# Patient Record
Sex: FEMALE | Race: WHITE | Employment: OTHER | URBAN - METROPOLITAN AREA
[De-identification: names, ages, dates, MRNs, and addresses within clinical notes are randomized per-mention and may not be internally consistent; named-entity substitution may affect disease eponyms.]

---

## 2021-01-08 ENCOUNTER — HOSPITAL ENCOUNTER (OUTPATIENT)
Age: 76
Setting detail: SPECIMEN
Discharge: HOME | End: 2021-01-08

## 2021-01-08 LAB
ALBUMIN SERPL-MCNC: 3 GM/DL (ref 3.4–5)
ALP BLD-CCNC: 108 IU/L (ref 40–128)
ALT SERPL-CCNC: 17 U/L (ref 10–40)
ANION GAP SERPL CALCULATED.3IONS-SCNC: 11 MMOL/L (ref 4–16)
AST SERPL-CCNC: 23 IU/L (ref 15–37)
BILIRUB SERPL-MCNC: 0.4 MG/DL (ref 0–1)
BUN BLDV-MCNC: 17 MG/DL (ref 6–23)
CALCIUM SERPL-MCNC: 8.4 MG/DL (ref 8.3–10.6)
CHLORIDE BLD-SCNC: 101 MMOL/L (ref 99–110)
CO2: 25 MMOL/L (ref 21–32)
CREAT SERPL-MCNC: 0.8 MG/DL (ref 0.6–1.1)
GFR AFRICAN AMERICAN: >60 ML/MIN/1.73M2
GFR NON-AFRICAN AMERICAN: >60 ML/MIN/1.73M2
GLUCOSE BLD-MCNC: 92 MG/DL (ref 70–99)
HCT VFR BLD CALC: 38.8 % (ref 37–47)
HEMOGLOBIN: 12 GM/DL (ref 12.5–16)
MCH RBC QN AUTO: 31.7 PG (ref 27–31)
MCHC RBC AUTO-ENTMCNC: 30.9 % (ref 32–36)
MCV RBC AUTO: 102.4 FL (ref 78–100)
PDW BLD-RTO: 14.3 % (ref 11.7–14.9)
PLATELET # BLD: 302 K/CU MM (ref 140–440)
PMV BLD AUTO: 9.3 FL (ref 7.5–11.1)
POTASSIUM SERPL-SCNC: 4.4 MMOL/L (ref 3.5–5.1)
PRO-BNP: 2732 PG/ML
RBC # BLD: 3.79 M/CU MM (ref 4.2–5.4)
SODIUM BLD-SCNC: 137 MMOL/L (ref 135–145)
TOTAL PROTEIN: 6 GM/DL (ref 6.4–8.2)
WBC # BLD: 7.9 K/CU MM (ref 4–10.5)

## 2021-01-08 PROCEDURE — 83880 ASSAY OF NATRIURETIC PEPTIDE: CPT

## 2021-01-08 PROCEDURE — 36415 COLL VENOUS BLD VENIPUNCTURE: CPT

## 2021-01-08 PROCEDURE — 85027 COMPLETE CBC AUTOMATED: CPT

## 2021-01-08 PROCEDURE — 80053 COMPREHEN METABOLIC PANEL: CPT

## 2021-08-19 ENCOUNTER — HOSPITAL ENCOUNTER (OUTPATIENT)
Dept: MRI IMAGING | Age: 76
Discharge: HOME | End: 2021-08-19
Payer: MEDICARE

## 2021-08-19 VITALS — OXYGEN SATURATION: 95 % | HEART RATE: 69 BPM | RESPIRATION RATE: 20 BRPM

## 2021-08-19 DIAGNOSIS — M54.42 ACUTE LEFT-SIDED LOW BACK PAIN WITH LEFT-SIDED SCIATICA: ICD-10-CM

## 2021-08-19 PROCEDURE — 72148 MRI LUMBAR SPINE W/O DYE: CPT

## 2021-08-19 NOTE — PROGRESS NOTES
1405 Pt in MRI for MRI of lumbar spine with pacemaker. Pt set to MRI safe mode of DOO rate of 70 per [employer] rep. Pt offers no complaints. 1412 Pt placed on scanner and attached to monitor. Scan started. 1428 Scan complete. Pt tolerated well. 1431 Pt returned to normal pacemaker settings per [employer] rep. Pt offers no complaints.

## 2022-01-30 ENCOUNTER — APPOINTMENT (OUTPATIENT)
Dept: GENERAL RADIOLOGY | Age: 77
DRG: 519 | End: 2022-01-30
Payer: MEDICARE

## 2022-01-30 ENCOUNTER — HOSPITAL ENCOUNTER (INPATIENT)
Age: 77
LOS: 12 days | Discharge: NURSING, REHAB OR LONG-TERM CARE | DRG: 519 | End: 2022-02-11
Attending: EMERGENCY MEDICINE | Admitting: ORTHOPAEDIC SURGERY
Payer: MEDICARE

## 2022-01-30 DIAGNOSIS — M48.00 SPINAL STENOSIS, UNSPECIFIED SPINAL REGION: ICD-10-CM

## 2022-01-30 DIAGNOSIS — M54.50 MIDLINE LOW BACK PAIN WITHOUT SCIATICA, UNSPECIFIED CHRONICITY: Primary | ICD-10-CM

## 2022-01-30 LAB
ABO: NORMAL
ANION GAP SERPL CALCULATED.3IONS-SCNC: 11 MEQ/L (ref 8–16)
ANTIBODY SCREEN: NORMAL
BASOPHILS # BLD: 0.3 %
BASOPHILS ABSOLUTE: 0 THOU/MM3 (ref 0–0.1)
BUN BLDV-MCNC: 14 MG/DL (ref 7–22)
CALCIUM SERPL-MCNC: 9.4 MG/DL (ref 8.5–10.5)
CHLORIDE BLD-SCNC: 101 MEQ/L (ref 98–111)
CO2: 25 MEQ/L (ref 23–33)
CREAT SERPL-MCNC: 0.5 MG/DL (ref 0.4–1.2)
EOSINOPHIL # BLD: 0.6 %
EOSINOPHILS ABSOLUTE: 0 THOU/MM3 (ref 0–0.4)
ERYTHROCYTE [DISTWIDTH] IN BLOOD BY AUTOMATED COUNT: 13.6 % (ref 11.5–14.5)
ERYTHROCYTE [DISTWIDTH] IN BLOOD BY AUTOMATED COUNT: 47 FL (ref 35–45)
GFR SERPL CREATININE-BSD FRML MDRD: > 90 ML/MIN/1.73M2
GLUCOSE BLD-MCNC: 85 MG/DL (ref 70–108)
HCT VFR BLD CALC: 41 % (ref 37–47)
HEMOGLOBIN: 13.3 GM/DL (ref 12–16)
IMMATURE GRANS (ABS): 0.02 THOU/MM3 (ref 0–0.07)
IMMATURE GRANULOCYTES: 0.3 %
LYMPHOCYTES # BLD: 31.4 %
LYMPHOCYTES ABSOLUTE: 2.5 THOU/MM3 (ref 1–4.8)
MCH RBC QN AUTO: 30.9 PG (ref 26–33)
MCHC RBC AUTO-ENTMCNC: 32.4 GM/DL (ref 32.2–35.5)
MCV RBC AUTO: 95.1 FL (ref 81–99)
MONOCYTES # BLD: 10.3 %
MONOCYTES ABSOLUTE: 0.8 THOU/MM3 (ref 0.4–1.3)
NUCLEATED RED BLOOD CELLS: 0 /100 WBC
OSMOLALITY CALCULATION: 273.5 MOSMOL/KG (ref 275–300)
PLATELET # BLD: 222 THOU/MM3 (ref 130–400)
PMV BLD AUTO: 10.6 FL (ref 9.4–12.4)
POTASSIUM REFLEX MAGNESIUM: 3.7 MEQ/L (ref 3.5–5.2)
PROCALCITONIN: 0.05 NG/ML (ref 0.01–0.09)
RBC # BLD: 4.31 MILL/MM3 (ref 4.2–5.4)
RH FACTOR: NORMAL
SEG NEUTROPHILS: 57.1 %
SEGMENTED NEUTROPHILS ABSOLUTE COUNT: 4.5 THOU/MM3 (ref 1.8–7.7)
SODIUM BLD-SCNC: 137 MEQ/L (ref 135–145)
TROPONIN T: < 0.01 NG/ML
WBC # BLD: 7.9 THOU/MM3 (ref 4.8–10.8)

## 2022-01-30 PROCEDURE — 86901 BLOOD TYPING SEROLOGIC RH(D): CPT

## 2022-01-30 PROCEDURE — 36415 COLL VENOUS BLD VENIPUNCTURE: CPT

## 2022-01-30 PROCEDURE — 86850 RBC ANTIBODY SCREEN: CPT

## 2022-01-30 PROCEDURE — 99221 1ST HOSP IP/OBS SF/LOW 40: CPT | Performed by: PHYSICIAN ASSISTANT

## 2022-01-30 PROCEDURE — 85025 COMPLETE CBC W/AUTO DIFF WBC: CPT

## 2022-01-30 PROCEDURE — 2580000003 HC RX 258: Performed by: PHYSICIAN ASSISTANT

## 2022-01-30 PROCEDURE — 84145 PROCALCITONIN (PCT): CPT

## 2022-01-30 PROCEDURE — 86900 BLOOD TYPING SEROLOGIC ABO: CPT

## 2022-01-30 PROCEDURE — 1200000000 HC SEMI PRIVATE

## 2022-01-30 PROCEDURE — 6370000000 HC RX 637 (ALT 250 FOR IP): Performed by: PHYSICIAN ASSISTANT

## 2022-01-30 PROCEDURE — 96374 THER/PROPH/DIAG INJ IV PUSH: CPT

## 2022-01-30 PROCEDURE — 71045 X-RAY EXAM CHEST 1 VIEW: CPT

## 2022-01-30 PROCEDURE — 80048 BASIC METABOLIC PNL TOTAL CA: CPT

## 2022-01-30 PROCEDURE — 6360000002 HC RX W HCPCS: Performed by: PHYSICIAN ASSISTANT

## 2022-01-30 PROCEDURE — 6360000002 HC RX W HCPCS: Performed by: EMERGENCY MEDICINE

## 2022-01-30 PROCEDURE — 84484 ASSAY OF TROPONIN QUANT: CPT

## 2022-01-30 PROCEDURE — 99283 EMERGENCY DEPT VISIT LOW MDM: CPT

## 2022-01-30 RX ORDER — SODIUM CHLORIDE 0.9 % (FLUSH) 0.9 %
5-40 SYRINGE (ML) INJECTION EVERY 12 HOURS SCHEDULED
Status: DISCONTINUED | OUTPATIENT
Start: 2022-01-30 | End: 2022-02-04

## 2022-01-30 RX ORDER — RANOLAZINE 500 MG/1
500 TABLET, EXTENDED RELEASE ORAL 2 TIMES DAILY
Status: DISCONTINUED | OUTPATIENT
Start: 2022-01-30 | End: 2022-01-31

## 2022-01-30 RX ORDER — ONDANSETRON 4 MG/1
4 TABLET, ORALLY DISINTEGRATING ORAL EVERY 8 HOURS PRN
Status: DISCONTINUED | OUTPATIENT
Start: 2022-01-30 | End: 2022-02-04

## 2022-01-30 RX ORDER — OXYCODONE HYDROCHLORIDE 5 MG/1
5 TABLET ORAL EVERY 4 HOURS PRN
Status: DISCONTINUED | OUTPATIENT
Start: 2022-01-30 | End: 2022-02-04

## 2022-01-30 RX ORDER — SODIUM CHLORIDE 9 MG/ML
25 INJECTION, SOLUTION INTRAVENOUS PRN
Status: DISCONTINUED | OUTPATIENT
Start: 2022-01-30 | End: 2022-02-04

## 2022-01-30 RX ORDER — METOPROLOL TARTRATE 50 MG/1
50 TABLET, FILM COATED ORAL EVERY MORNING
Status: DISCONTINUED | OUTPATIENT
Start: 2022-01-31 | End: 2022-01-31

## 2022-01-30 RX ORDER — NITROGLYCERIN 0.4 MG/1
0.4 TABLET SUBLINGUAL EVERY 5 MIN PRN
Status: DISCONTINUED | OUTPATIENT
Start: 2022-01-30 | End: 2022-02-11 | Stop reason: HOSPADM

## 2022-01-30 RX ORDER — SODIUM CHLORIDE 0.9 % (FLUSH) 0.9 %
5-40 SYRINGE (ML) INJECTION PRN
Status: DISCONTINUED | OUTPATIENT
Start: 2022-01-30 | End: 2022-02-04

## 2022-01-30 RX ORDER — LISINOPRIL 20 MG/1
20 TABLET ORAL 2 TIMES DAILY
Status: DISCONTINUED | OUTPATIENT
Start: 2022-01-30 | End: 2022-01-31

## 2022-01-30 RX ORDER — HYDROCHLOROTHIAZIDE 25 MG/1
25 TABLET ORAL EVERY OTHER DAY
Status: DISCONTINUED | OUTPATIENT
Start: 2022-02-01 | End: 2022-01-31

## 2022-01-30 RX ORDER — CLOPIDOGREL BISULFATE 75 MG/1
75 TABLET ORAL EVERY MORNING
Status: DISCONTINUED | OUTPATIENT
Start: 2022-01-31 | End: 2022-02-11 | Stop reason: HOSPADM

## 2022-01-30 RX ORDER — HYDROXYCHLOROQUINE SULFATE 200 MG/1
200 TABLET, FILM COATED ORAL 2 TIMES DAILY
Status: DISCONTINUED | OUTPATIENT
Start: 2022-01-30 | End: 2022-01-31

## 2022-01-30 RX ORDER — BISACODYL 10 MG
10 SUPPOSITORY, RECTAL RECTAL DAILY PRN
Status: DISCONTINUED | OUTPATIENT
Start: 2022-01-30 | End: 2022-02-04

## 2022-01-30 RX ORDER — ASPIRIN 81 MG/1
81 TABLET ORAL EVERY MORNING
Status: DISCONTINUED | OUTPATIENT
Start: 2022-01-31 | End: 2022-02-04

## 2022-01-30 RX ORDER — ATORVASTATIN CALCIUM 10 MG/1
10 TABLET, FILM COATED ORAL NIGHTLY
Status: DISCONTINUED | OUTPATIENT
Start: 2022-01-30 | End: 2022-02-11 | Stop reason: HOSPADM

## 2022-01-30 RX ORDER — ONDANSETRON 2 MG/ML
4 INJECTION INTRAMUSCULAR; INTRAVENOUS EVERY 6 HOURS PRN
Status: DISCONTINUED | OUTPATIENT
Start: 2022-01-30 | End: 2022-02-04

## 2022-01-30 RX ORDER — OXYCODONE HYDROCHLORIDE 5 MG/1
10 TABLET ORAL EVERY 4 HOURS PRN
Status: DISCONTINUED | OUTPATIENT
Start: 2022-01-30 | End: 2022-02-04

## 2022-01-30 RX ORDER — POLYETHYLENE GLYCOL 3350 17 G/17G
17 POWDER, FOR SOLUTION ORAL DAILY
Status: DISCONTINUED | OUTPATIENT
Start: 2022-01-30 | End: 2022-02-04

## 2022-01-30 RX ORDER — PANTOPRAZOLE SODIUM 40 MG/1
40 TABLET, DELAYED RELEASE ORAL
Status: DISCONTINUED | OUTPATIENT
Start: 2022-01-31 | End: 2022-02-11 | Stop reason: HOSPADM

## 2022-01-30 RX ORDER — ACETAMINOPHEN 325 MG/1
650 TABLET ORAL EVERY 4 HOURS PRN
Status: DISCONTINUED | OUTPATIENT
Start: 2022-01-30 | End: 2022-02-04

## 2022-01-30 RX ADMIN — ONDANSETRON HYDROCHLORIDE 4 MG: 4 INJECTION, SOLUTION INTRAMUSCULAR; INTRAVENOUS at 19:25

## 2022-01-30 RX ADMIN — ATORVASTATIN CALCIUM 10 MG: 10 TABLET, FILM COATED ORAL at 22:12

## 2022-01-30 RX ADMIN — HYDROMORPHONE HYDROCHLORIDE 1 MG: 1 INJECTION, SOLUTION INTRAMUSCULAR; INTRAVENOUS; SUBCUTANEOUS at 17:06

## 2022-01-30 RX ADMIN — POLYETHYLENE GLYCOL (3350) 17 G: 17 POWDER, FOR SOLUTION ORAL at 22:12

## 2022-01-30 RX ADMIN — SODIUM CHLORIDE, PRESERVATIVE FREE 10 ML: 5 INJECTION INTRAVENOUS at 22:41

## 2022-01-30 RX ADMIN — ACETAMINOPHEN 650 MG: 325 TABLET ORAL at 22:12

## 2022-01-30 ASSESSMENT — ENCOUNTER SYMPTOMS
SHORTNESS OF BREATH: 0
COUGH: 0
SINUS PRESSURE: 0
ABDOMINAL PAIN: 0
DIARRHEA: 0
CONSTIPATION: 0
EYE PAIN: 0
NAUSEA: 0
CHEST TIGHTNESS: 0
SINUS PAIN: 0
BACK PAIN: 1

## 2022-01-30 ASSESSMENT — PAIN DESCRIPTION - PAIN TYPE: TYPE: ACUTE PAIN

## 2022-01-30 ASSESSMENT — PAIN SCALES - GENERAL
PAINLEVEL_OUTOF10: 7
PAINLEVEL_OUTOF10: 10
PAINLEVEL_OUTOF10: 10

## 2022-01-30 ASSESSMENT — PAIN DESCRIPTION - ORIENTATION
ORIENTATION: LOWER
ORIENTATION: LOWER

## 2022-01-30 ASSESSMENT — PAIN DESCRIPTION - LOCATION
LOCATION: BACK
LOCATION: BACK
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
## 2022-01-30 NOTE — ED NOTES
Patient resting in bed. Respirations easy and unlabored. No distress noted. Call light within reach.        Irina Busby RN  01/30/22 [id number]
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
## 2022-01-30 NOTE — ED TRIAGE NOTES
Presents to ED with c/o lower back pain that started getting worse 2 days ago. Called surgeon and was told to come here. Alert and oriented. Respirations easy and unlabored.

## 2022-01-30 NOTE — ED NOTES
Pt transported to [facility] by cart in stable condition. Called [facility] and informed them that the patient was on their way to the unit.       Jaleesa Humphrey, SKYLER  44/78/36 1462

## 2022-01-30 NOTE — PROGRESS NOTES
1840-Patient arrived to unit via wheelchair. Patient AOx4, on RA.  Dr Galileo Awad was consulted as Hospitalist and Cardiology was also consulted per orders

## 2022-01-31 LAB
LV EF: 55 %
LVEF MODALITY: NORMAL

## 2022-01-31 PROCEDURE — 6370000000 HC RX 637 (ALT 250 FOR IP): Performed by: PHYSICIAN ASSISTANT

## 2022-01-31 PROCEDURE — 1200000000 HC SEMI PRIVATE

## 2022-01-31 PROCEDURE — 97165 OT EVAL LOW COMPLEX 30 MIN: CPT

## 2022-01-31 PROCEDURE — 99223 1ST HOSP IP/OBS HIGH 75: CPT | Performed by: INTERNAL MEDICINE

## 2022-01-31 PROCEDURE — 97110 THERAPEUTIC EXERCISES: CPT

## 2022-01-31 PROCEDURE — 97535 SELF CARE MNGMENT TRAINING: CPT

## 2022-01-31 PROCEDURE — 97161 PT EVAL LOW COMPLEX 20 MIN: CPT

## 2022-01-31 PROCEDURE — 2580000003 HC RX 258: Performed by: PHYSICIAN ASSISTANT

## 2022-01-31 PROCEDURE — 93306 TTE W/DOPPLER COMPLETE: CPT

## 2022-01-31 PROCEDURE — 93005 ELECTROCARDIOGRAM TRACING: CPT | Performed by: PHYSICIAN ASSISTANT

## 2022-01-31 RX ORDER — FUROSEMIDE 20 MG/1
20 TABLET ORAL DAILY
Status: DISCONTINUED | OUTPATIENT
Start: 2022-01-31 | End: 2022-02-11 | Stop reason: HOSPADM

## 2022-01-31 RX ORDER — PNV NO.95/FERROUS FUM/FOLIC AC 28MG-0.8MG
250 TABLET ORAL DAILY
Status: DISCONTINUED | OUTPATIENT
Start: 2022-01-31 | End: 2022-02-11 | Stop reason: HOSPADM

## 2022-01-31 RX ORDER — FUROSEMIDE 20 MG/1
20 TABLET ORAL DAILY
Status: ON HOLD | COMMUNITY
End: 2022-02-22 | Stop reason: HOSPADM

## 2022-01-31 RX ORDER — DIGOXIN 125 MCG
125 TABLET ORAL DAILY
Status: DISCONTINUED | OUTPATIENT
Start: 2022-01-31 | End: 2022-02-11 | Stop reason: HOSPADM

## 2022-01-31 RX ORDER — DIGOXIN 125 MCG
125 TABLET ORAL DAILY
COMMUNITY

## 2022-01-31 RX ORDER — DENOSUMAB 60 MG/ML
60 INJECTION SUBCUTANEOUS
COMMUNITY

## 2022-01-31 RX ORDER — MULTIVITAMIN WITH IRON
250 TABLET ORAL DAILY
COMMUNITY

## 2022-01-31 RX ADMIN — METOPROLOL TARTRATE 37.5 MG: 25 TABLET, FILM COATED ORAL at 19:38

## 2022-01-31 RX ADMIN — OXYCODONE 5 MG: 5 TABLET ORAL at 10:25

## 2022-01-31 RX ADMIN — OXYCODONE 5 MG: 5 TABLET ORAL at 21:01

## 2022-01-31 RX ADMIN — METOPROLOL TARTRATE 37.5 MG: 25 TABLET, FILM COATED ORAL at 12:12

## 2022-01-31 RX ADMIN — SODIUM CHLORIDE, PRESERVATIVE FREE 10 ML: 5 INJECTION INTRAVENOUS at 12:12

## 2022-01-31 RX ADMIN — Medication 250 MG: at 12:12

## 2022-01-31 RX ADMIN — ATORVASTATIN CALCIUM 10 MG: 10 TABLET, FILM COATED ORAL at 19:38

## 2022-01-31 RX ADMIN — OXYCODONE 5 MG: 5 TABLET ORAL at 15:42

## 2022-01-31 RX ADMIN — ACETAMINOPHEN 650 MG: 325 TABLET ORAL at 03:19

## 2022-01-31 RX ADMIN — ACETAMINOPHEN 650 MG: 325 TABLET ORAL at 23:21

## 2022-01-31 RX ADMIN — OXYCODONE 5 MG: 5 TABLET ORAL at 06:03

## 2022-01-31 RX ADMIN — FUROSEMIDE 20 MG: 20 TABLET ORAL at 12:12

## 2022-01-31 RX ADMIN — DIGOXIN 125 MCG: 125 TABLET ORAL at 12:12

## 2022-01-31 ASSESSMENT — PAIN SCALES - GENERAL
PAINLEVEL_OUTOF10: 7
PAINLEVEL_OUTOF10: 5
PAINLEVEL_OUTOF10: 6
PAINLEVEL_OUTOF10: 5
PAINLEVEL_OUTOF10: 8
PAINLEVEL_OUTOF10: 8
PAINLEVEL_OUTOF10: 4
PAINLEVEL_OUTOF10: 8
PAINLEVEL_OUTOF10: 6
PAINLEVEL_OUTOF10: 5
PAINLEVEL_OUTOF10: 6

## 2022-01-31 ASSESSMENT — PAIN DESCRIPTION - PAIN TYPE
TYPE: CHRONIC PAIN

## 2022-01-31 ASSESSMENT — PAIN DESCRIPTION - ORIENTATION
ORIENTATION: LEFT;RIGHT
ORIENTATION: LOWER;MID

## 2022-01-31 ASSESSMENT — PAIN DESCRIPTION - LOCATION
LOCATION: BACK;LEG
LOCATION: BACK
LOCATION: BACK

## 2022-01-31 ASSESSMENT — ENCOUNTER SYMPTOMS
BACK PAIN: 1
COUGH: 0
CHEST TIGHTNESS: 0
SHORTNESS OF BREATH: 0

## 2022-01-31 ASSESSMENT — PAIN DESCRIPTION - FREQUENCY
FREQUENCY: CONTINUOUS
FREQUENCY: CONTINUOUS

## 2022-01-31 ASSESSMENT — PAIN DESCRIPTION - PROGRESSION
CLINICAL_PROGRESSION: NOT CHANGED
CLINICAL_PROGRESSION: NOT CHANGED

## 2022-01-31 ASSESSMENT — PAIN DESCRIPTION - ONSET
ONSET: ON-GOING
ONSET: ON-GOING

## 2022-01-31 ASSESSMENT — PAIN DESCRIPTION - DESCRIPTORS
DESCRIPTORS: ACHING
DESCRIPTORS: ACHING

## 2022-01-31 ASSESSMENT — PAIN - FUNCTIONAL ASSESSMENT
PAIN_FUNCTIONAL_ASSESSMENT: ACTIVITIES ARE NOT PREVENTED
PAIN_FUNCTIONAL_ASSESSMENT: ACTIVITIES ARE NOT PREVENTED

## 2022-01-31 NOTE — PROGRESS NOTES
[address] [address]  INPATIENT PHYSICAL THERAPY  EVALUATION  [facility] ORTHOPEDICS 7K - 7K-07/007-A    Time In: 1113  Time Out: 1124  Timed Code Treatment Minutes: 6 Minutes  Minutes: 11      Time In: 2544  Time Out: 1333  Timed Code Treatment Minutes: 12 Minutes  Minutes: 12          Date: 2022  Patient Name: Danyelle Thapa,  Gender:  female        MRN: [id number]  : 1945  (77 y.o.)      Referring Practitioner: Sandra Young PA-C  Diagnosis: spinal stenosis  Additional Pertinent Hx: Per EMR \"Gail is a 75y/o female known to our office having been seen in the past several months with complaints of low back pain and leg pain. She has been through multiple epidural injections, most recently last month, with some mild-moderate relief of her pain. Over the past several days her pain had been worsening significantly to the point it was completely uncontrolled and her mobility had declined significantly. She called our office and the only recommendation was for her to present to the ED for evaluation and admission for further care. She does have a history of previous lumbar spine surgery roughly 20yrs ago. History of pacemaker, afib and is on plavix as well. She describes a sharp pain traveling from the low back into the left lateral/anterior thigh are. No new changes in bowel/bladder continence. \"     Restrictions/Precautions:  Restrictions/Precautions: General Precautions,Fall Risk  Position Activity Restriction  Other position/activity restrictions: spinal stenosis. potential surgery on  pending cardiac clearance, pacemaker    Subjective:  Chart Reviewed: Yes  Patient assessed for rehabilitation services?: Yes  Family / Caregiver Present: No  Subjective: Ok to see pt per nursing. Nuring requesting PT to get back to bed as pt having ECHO soon. Once pt in bed and starting exercises, imaging present to take ECHO. PT came back to cont with assessment.     General:  Overall Orientation Status: Within Normal Limits  Follows Commands: Within Functional Limits    Vision: Impaired  Vision Exceptions: Wears glasses for distance    Hearing: Within functional limits         Pain: 8/10: back, into R buttock, and thigh    Vitals: Vitals not assessed per clinical judgement, see nursing flowsheet    Social/Functional History:    Lives With: Alone  Type of Home: House  Home Layout: One level  Home Access: Stairs to enter without rails  Entrance Stairs - Number of Steps: 2 +1  Home Equipment: Rolling walker,Standard walker,Wheelchair-manual,Cane     Bathroom Shower/Tub: Tub/Shower unit  Bathroom Toilet: Handicap height  Bathroom Equipment:  (countertop on one side)  Bathroom Accessibility: Accessible    Receives Help From:  (friend helps with cleaning.)  ADL Assistance: Independent  Homemaking Assistance: Independent  Homemaking Responsibilities: Yes  Ambulation Assistance: Independent  Transfer Assistance: Independent    Active : Yes  Occupation: Retired  Additional Comments: Pt IND prior, family in the area to help as needed. Pt was using cane about 1-2 weeks prior to admission due to pain    OBJECTIVE:  Range of Motion:  Bilateral Lower Extremity: WNL    Strength:  Bilateral Lower Extremity: Impaired - 4-/5    Balance:  Static Sitting Balance:  Supervision  Dynamic Sitting Balance: Supervision, Stand By Assistance  Static Standing Balance: Contact Guard Assistance  RW for support once in standing  Bed Mobility:  Rolling to Left: Stand By Assistance, X 1   Rolling to Right: Stand By Assistance, X 1   Sit to Supine:  Moderate Assistance, X 1, with head of bed flat, with rail, with verbal cues    Assist required for B LE to place on bed  Transfers:  Sit to Stand: Contact Guard Assistance, X 1, cues for hand placement, with verbal cues  Stand to [address] [address], X 1, cues for hand placement, with verbal cues  RW for support for safety, no LOB, increased time due to weakness and pain  Ambulation:  Contact Guard Assistance, Minimal Assistance, X 1, with cues for safety, with verbal cues , with increased time for completion  Distance: 13 feet  Surface: Level Tile  Device:Rolling Walker  Gait Deviations: Forward Flexed Posture, Slow [place], Decreased Step Length Bilaterally, Decreased Weight Shift Bilaterally, Decreased Gait Speed, Decreased Heel Strike Bilaterally, Unsteady Gait and Decreased Terminal Knee Extension  Cues for safety, buckling noted with B LE due to weakness and pain  Exercise:  Patient was guided in 1 set(s) 10 reps of exercise to both lower extremities. Ankle pumps, Glut sets, Quad sets, Heelslides, Hip abduction/adduction and Straight leg raises. Exercises were completed for increased independence with functional mobility. VC and visual cues for proper technique of exercises for completion with good demo    Functional Outcome Measures: Completed  AM-PAC Inpatient Mobility Raw Score : 17  AM-PAC Inpatient T-Scale Score : 42.13    ASSESSMENT:  Activity Tolerance:  Patient tolerance of  treatment: fair. Increased pain limiting activity      Treatment Initiated: Treatment and education initiated within context of evaluation. Evaluation time included review of current medical information, gathering information related to past medical, social and functional history, completion of standardized testing, formal and informal observation of tasks, assessment of data and development of plan of care and goals. Treatment time included skilled education and facilitation of tasks to increase safety and independence with functional mobility for improved independence and quality of life. Assessment: Body structures, Functions, Activity limitations: Decreased functional mobility ,Increased pain,Decreased posture,Decreased balance,Decreased strength,Decreased endurance  Assessment: Pt noted to have increased pain in back, into L buttock and thigh causing overall debility.  Pt requires skilled PT services to progress with transfers, increase IND with functional tasks, progress with pain and increase ease with mobility. Prognosis: Good    REQUIRES PT FOLLOW UP: Yes    Discharge Recommendations:  Discharge Recommendations: Continue to assess pending progress,Patient would benefit from continued therapy after discharge    Patient Education:  PT Education: [address] [facility] Mobility Training,Equipment,Transfer Training  Patient Education: d/c planning    Equipment Recommendations:  Equipment Needed: No    Plan:  Times per week: 5x O  Current Treatment Recommendations: Strengthening,Neuromuscular Re-education,Home Exercise Program,Safety Education & Training,Balance Training,Endurance Training,Patient/Caregiver Education & Training,Functional Mobility Training,Equipment Evaluation, Education, & procurement,Transfer Training,Gait Training,Stair training    Goals:  Patient goals : \"feel better\"  Short term goals  Time Frame for Short term goals: by discharge  Short term goal 1: Pt will demo IND with transfers with LRAD for support to return home safely. Short term goal 2: Pt will demo IND with gait with LRAD for support with good safety to progress towards PLOF. Short term goal 3: Pt will negotiate steps for PHYLLIS the home with IND to progress towards PLOF. Short term goal 4: Pt will demo IND with supine to and from sit transfers with good technique to progress with mobility. Long term goals  Time Frame for Long term goals : NA due to short ELOS    Following session, patient left in safe position with all fall risk precautions in place. Pt left in bed following session, all needs and call light in reach, bed alarm on.

## 2022-01-31 NOTE — PROGRESS NOTES
H&P dictated. On plavix and ASA, will need probable 5 day washout. Need cardio eval and clearance. Possible plan for surgery maybe Friday 2/4. Awaiting further workup to determine options for treatment.

## 2022-01-31 NOTE — PROGRESS NOTES
[address]  INPATIENT OCCUPATIONAL THERAPY  [facility] ORTHOPEDICS 7K  EVALUATION    Time:    Time In: 8116  Time Out: 1042  Timed Code Treatment Minutes: 25 Minutes  Minutes: 42          Date: 2022  Patient Name: Dk Parra,   Gender: female      MRN: [id number]  : 1945  (77 y.o.)  Referring Practitioner: JEROME Macedo PA-C  Diagnosis: spinal stenosis  Additional Pertinent Hx: Charanjit Linner y.o. female who we are asked to see/evaluate by Chuck Camacho MD for medical management of rheumatoid arthritis. Cardiology has been consulted on cardiology related matters. Patient has several months history of low back pain that radiates to the left groin and left buttock with radiation down the left leg. The patient has had epidural injections at [facility] as late as 2022. The patient states this weekend her pain was very intense and she was referred to the ED for admission for surgery. Restrictions/Precautions:  Restrictions/Precautions: General Precautions,Fall Risk  Position Activity Restriction  Other position/activity restrictions: spinal stenosis. potential surgery on  pending cardiac clearance    Subjective  Chart Reviewed: [employer] and Physical  Patient assessed for rehabilitation services?: Yes  Family / Caregiver Present: No    Subjective: pleasant, cooperative . BP lower this AM, allowing rest breaks between transitions. Pain:  Pain Assessment  Patient Currently in Pain: Yes  Pain Level: 8  Pain Type: Chronic pain  Pain Location: Back    Vitals: Vitals not assessed per clinical judgement, see nursing flowsheet- no formal dizziness reported. BP lower this AM per RN.      Social/Functional History:  Lives With: Alone  Type of Home: House  Home Layout: One level  Home Access: Stairs to enter without rails  Entrance Stairs - Number of Steps: 2 +1  Home Equipment: Rolling walker,Standard walker,Wheelchair-manual,Cane   Bathroom Shower/Tub: Tub/Shower unit  Bathroom Toilet: Handicap height  Bathroom Equipment:  (countertop on one side)  Bathroom Accessibility: Accessible    Receives Help From:  (friend helps with cleaning.)  ADL Assistance: Independent  Homemaking Assistance: Independent  Homemaking Responsibilities: Yes  Ambulation Assistance: Independent  Transfer Assistance: Independent    Active : Yes  Occupation: Retired  Additional Comments: Pt IND prior, family in the area to help as needed. Pt was using cane about 1-2 weeks prior to admission due to pain    VISION:WNL    HEARING:  WNL    COGNITION: WFL    RANGE OF MOTION:  Bilateral Upper Extremity:  Impaired - B shoulders WFL. significant deviation of th MPs  of the hands. swan neck deformity    STRENGTH:  Bilateral Upper Extremity:  Impaired - fair endurance noted. SENSATION:   WFL    ADL:   Feeding: Modified Independent. Grooming: Contact Guard Assistance. standing sinkside x 3 min for grooming tasks with min cues for back safety  Upper Extremity Dressing: Stand By Assistance. Lower Extremity Dressing: Moderate Assistance. assist for B LEs into pants and underwear. Pt able to pull over hips with fair balance   Toileting: Contact Guard Assistance.  c   Toilet Transfer: [address] ETS. Thurl Mutton BALANCE:  Sitting Balance:  Supervision. Standing Balance: Contact Guard Assistance. BED MOBILITY:  Rolling to Right: Minimal Assistance    Supine to Sit: Contact Guard Assistance      TRANSFERS:  Sit to Stand:  Contact Guard Assistance. Stand to Sit: Contact Guard Assistance. FUNCTIONAL MOBILITY:  Assistive Device: Rolling Walker  Assist Level:  Contact Guard Assistance. Distance: To and from bathroom, To and from shower room, To and from therapy gym, To and from therapy apartment and fatigue and pain in L side of groin and back evident.          Exercise:  Pt. completed BUE strengthening exercises x10 reps x 1  set/s using min resistance band in all joints/planes. Activity Tolerance:  Patient tolerance of  treatment: good. Assessment:  Assessment: Pt presents with a decline in independent functioning due to high pain levels peyman have not responsed  to conservative measures utilized wtihtn the home . She would benefit from additional skilled OT services to address back safety with ADls and IALDs. Performance deficits / Impairments: Decreased functional mobility ,Decreased safe awareness,Decreased balance,Decreased ADL status,Decreased endurance,Decreased strength,Decreased high-level IADLs  Prognosis: Fair  Decision Making: Medium Complexity    Treatment Initiated:Treatment and education initiated within context of evaluation. Evaluation time included review of current medical information, gathering information related to past medical, social and functional history, completion of standardized testing, formal and informal observation of tasks, assessment of data and development of plan of care and goals. Treatment time included skilled education and facilitation of tasks to increase safety and independence with ADL's for improved functional independence and quality of life. Discharge Recommendations:  Continue to assess pending progress    Patient Education:  OT Education: OT Role,Plan of Care,Home Exercise Program,Precautions,ADL Adaptive Strategies    Equipment Recommendations:   cont to assess. Plan:  Times per week: 3-5  Current Treatment Recommendations: Strengthening,Endurance Training,Self-Care / ADL,Patient/Caregiver Education & Training,Balance Training,Pain Management,Home Management Training,Functional Mobility Training,Safety Education & Training. See long-term goal time frame for expected duration of plan of care. If no long-term goals established, a short length of stay is anticipated.     Goals:  Patient goals : Decrease pain to be able to tolerate walking better  Short term goals  Time Frame for Short term goals: by discharge pt will  Short term goal 1: complete ADL routine with S and no cues for back safety, AE PRN  Short term goal 2: tolerate standing  x 5 min with S to increase endurance for sinkside ADL routine  Short term goal 3: ambulate to/ from the BR as well as around obstacles with S and AE PRN to increase endurance / tolerance for home mobility  Short term goal 4: complete simulated homemaking tasks with SBA and no cues for back safety  Long term goals  Time Frame for Long term goals : not est due to est short LOS         Following session, patient left in safe position with all fall risk precautions in place.

## 2022-01-31 NOTE — PROGRESS NOTES
Message sent to Chalo Erazo re: medication list updates and need to alter orders. Monitor. 1104: medications updated according to provider orders.
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
## 2022-01-31 NOTE — CONSULTS
REason Of consultation  Pre op eval and recommendation on [address]    Primary cardiologist Dr. Melba Fields at [facility]    HPI    Hay Haley y.o. female patient who known to have CAD, PAF, sss , pacer and rheumatoid arthritis admitted for worsening of back pain for possible surgical management.      Patient has several months history of low back pain that radiates to the left groin and left buttock with radiation down the left leg. The patient has had epidural injections at [facility] as late as 1/17/2022. The patient states this weekend her pain was very intense and she was referred to the ED for admission for surgery.     Cardiology consulted for pre op eval and antiplatelet recommendation    Known to have CAD and last ROJELIO was dec 2020 at [address] cp, sob , palpitation    Activity - METS < 4    Limited ambulation due to back pain and RA    Know to have abn ekg and current troponin negative     Nonsmoker       Chief Complaint   Patient presents with    Back Pain       Patient Active Problem List   Diagnosis    Angina, class III ([address]    Recurrent coronary arteriosclerosis following PTCA    ASHD (arteriosclerotic heart disease)    Lipidemia    HTN (hypertension)    Rheumatoid arthritis ([address]    COPD bronchitis    Chest pain    Spinal stenosis       Past Surgical History:   Procedure Laterality Date    ABDOMINAL EXPLORATION SURGERY  1969    Exploratory Surgery Ovarian Cysts    APPENDECTOMY  1963   Jacqueline Mandujano 79    \"L4 & L5 No Fusion\"    BACK SURGERY  2000    \"L4 & L5 No Fusion\"    BLADDER SUSPENSION  1987    CHOLECYSTECTOMY, LAPAROSCOPIC  1996    CORONARY ANGIOPLASTY  7-25-05    Stent RAD    CORONARY ANGIOPLASTY  12-15-05    Stent LAD    CORONARY ANGIOPLASTY  7-27-09    Stent LAD    DENTAL SURGERY      Teeth Extracted In Past    ENDOSCOPY, COLON, DIAGNOSTIC  \"Two\"8-14, 11-14    FOOT SURGERY Right 2003    Right Foot And Toe    FOOT SURGERY Left 2004    Left Foot/Toe Surgery (Screw In Left Great Toe)    FOOT SURGERY  2013    Right Foot Surgery And Repair , Left Knee  Stitch Removed    FOOT SURGERY Left 10/21/2016    Arthroplasty Great Left Toe    HEMORRHOID SURGERY  1972    HYSTERECTOMY  1976    Partial Abdominal Hysterectomy    JOINT REPLACEMENT Left 8-27-97    Total Left Knee    JOINT REPLACEMENT Right 9-4-97    Total Right Knee    JOINT REPLACEMENT Left 5-11-07    Total Left Knee    JOINT REPLACEMENT Right 8-16-07    Total Right Hip    JOINT REPLACEMENT Left 11-20-08    Total Left Hip    KNEE SURGERY Left 2010    Left Knee Muscle Ruptured And Repaired    OTHER SURGICAL HISTORY Right 12-5-13    Right Foot Arthrodesis With Debridement    OTHER SURGICAL HISTORY Bilateral 3-11-14    Debride Metatarsal Shaft Of Multiple Toes  Left 2&3  Right 2,3 & 4    OTHER SURGICAL HISTORY  1980    \"Both Ovaries Removed\"    OTHER SURGICAL HISTORY Left 2010    Left Tibia Derek Replaced    OTHER SURGICAL HISTORY Bilateral 02/18/2015    Removal hardware left great toe and removal plate and screws right foot       Allergies   Allergen Reactions    Morphine Rash    Tape Delona Zaheer Tape]      \"Causes Itching, Redness And Rash\",  \"Paper Tape Is Ok To Use\"        Family History   Problem Relation Age of Onset    Heart Disease Mother         CHF    Asthma Mother     High Blood Pressure Mother     Other Mother         Polio    Heart Disease Father         CHF    Arthritis Father         Rheumatoid Arthritis        Social History     Socioeconomic History    Marital status:      Spouse name: Not on file    Number of children: Not on file    Years of education: Not on file    Highest education level: Not on file   Occupational History    Not on file   Tobacco Use    Smoking status: Never Smoker    Smokeless tobacco: Never Used   Substance and Sexual Activity    Alcohol use: No    Drug use: No    Sexual activity: Never   Other Topics Concern    Not on file   Social History Narrative  Not on file     Social Determinants of Health     Financial Resource Strain:     Difficulty of Paying Living Expenses: Not on file   Food Insecurity:     Worried About Running Out of Food in the Last Year: Not on file    Angy of Food in the Last Year: Not on file   Transportation Needs:     Lack of Transportation (Medical): Not on file    Lack of Transportation (Non-Medical):  Not on file   Physical Activity:     Days of Exercise per Week: Not on file    Minutes of Exercise per Session: Not on file   Stress:     Feeling of Stress : Not on file   Social Connections:     Frequency of Communication with Friends and Family: Not on file    Frequency of Social Gatherings with Friends and Family: Not on file    Attends Zoroastrianism Services: Not on file    Active Member of [address] [place] [address] [employer] or Organizations: Not on file    Attends Club or Organization Meetings: Not on file    Marital Status: Not on file   Intimate Partner Violence:     Fear of Current or Ex-Partner: Not on file    Emotionally Abused: Not on file    Physically Abused: Not on file    Sexually Abused: Not on file   Housing Stability:     Unable to Pay for Housing in the Last Year: Not on file    Number of [place] in the Last Year: Not on file    Unstable Housing in the Last Year: Not on file       Current Facility-Administered Medications   Medication Dose Route Frequency Provider Last Rate Last Admin    atorvastatin (LIPITOR) tablet 10 mg  10 mg Oral Nightly July Schwartz PA-C   10 mg at 01/30/22 2212    [START ON 2/4/2022] methotrexate (RHEUMATREX) chemo tablet 2.5 mg  2.5 mg Oral Weekly July Schwartz PA-C        nitroGLYCERIN (NITROSTAT) SL tablet 0.4 mg  0.4 mg SubLINGual Q5 Min PRN July Schwartz PA-C        [START ON 2/1/2022] hydroCHLOROthiazide (HYDRODIURIL) tablet 25 mg  25 mg Oral Every Other Day July Schwartz PA-C        hydroxychloroquine (PLAQUENIL) tablet 200 mg  200 mg Oral BID July Schwartz PA-C        lisinopril (PRINIVIL;ZESTRIL) tablet 20 mg  20 mg Oral BID ROXANA Olivarez-LEAH        ranolazine [facility]) extended release tablet 500 mg  500 mg Oral BID ROXANA Olivarez-LEAH        metoprolol tartrate (LOPRESSOR) tablet 50 mg  50 mg Oral QAM ROXANA Olivarez-C        pantoprazole (PROTONIX) tablet 40 mg  40 mg Oral QAM AC Kathi Pretty PA-C        [Held by provider] aspirin EC tablet 81 mg  81 mg Oral QAM Kathi Pretty PA-C        [Held by provider] clopidogrel (PLAVIX) tablet 75 mg  75 mg Oral QAM ROXANA Olivarez-C        sodium chloride flush 0.9 % injection 5-40 mL  5-40 mL IntraVENous 2 times per day INDIA OlivarezC   10 mL at 01/30/22 2241    sodium chloride flush 0.9 % injection 5-40 mL  5-40 mL IntraVENous PRN ROXANA Olivarez-C        0.9 % sodium chloride infusion  25 mL IntraVENous PRN Kathi Pretty PA-C        ondansetron (ZOFRAN-ODT) disintegrating tablet 4 mg  4 mg Oral Q8H PRN Kathi Pretty PA-C        Or    ondansetron [facility]) injection 4 mg  4 mg IntraVENous Q6H PRN Kathi Pretty PA-C   4 mg at 01/30/22 1925    oxyCODONE (ROXICODONE) immediate release tablet 5 mg  5 mg Oral Q4H PRN INDIA OlivarezC   5 mg at 01/31/22 6256    Or    oxyCODONE (ROXICODONE) immediate release tablet 10 mg  10 mg Oral Q4H PRN Kathi Pretty PA-C        acetaminophen (TYLENOL) tablet 650 mg  650 mg Oral Q4H PRN INDIA OlivarezC   650 mg at 01/31/22 0319    polyethylene glycol (GLYCOLAX) packet 17 g  17 g Oral Daily INDIA OlivarezC   17 g at 01/30/22 2212    bisacodyl (DULCOLAX) suppository 10 mg  10 mg Rectal Daily PRN Kathi Pretty PA-C           Review of Systems -     General ROS: negative  Psychological ROS: negative  Hematological and Lymphatic ROS: No history of blood clots or bleeding disorder.    Respiratory ROS: no cough,  or wheezing, the rest see HPI  Cardiovascular ROS: See HPI  Gastrointestinal ROS: negative  Genito-Urinary ROS: no dysuria, trouble voiding, or hematuria  Musculoskeletal ROS: negative  Neurological ROS: no TIA or stroke symptoms  Dermatological ROS: negative      Blood pressure 135/73, pulse 65, temperature 97.7 °F (36.5 °C), temperature source Oral, resp. rate 18, height 5' 1\" (1.549 m), weight 115 lb (52.2 kg), SpO2 94 %, not currently breastfeeding. Physical Examination:    General appearance - alert, well appearing, and in no distress  HEENT- Pink conjunctiva  , Non-icteri sclera,PERRLA  Mental status - alert, oriented to person, place, and time  Neck - supple, no significant adenopathy, no JVD, or carotid bruits  Chest - clear to auscultation, no wheezes, rales or rhonchi, symmetric air entry  Heart - normal rate, regular rhythm, normal S1, S2, no murmurs, rubs, clicks or gallops  Abdomen - soft, nontender, nondistended, no masses or organomegaly  ESTUARDO- no CVA or flank tenderness, no suprapubic tenderness  Neurological - alert, oriented, normal speech, no focal findings or movement disorder noted  Musculoskeletal/limbs - no joint tenderness, deformity or swelling   - peripheral pulses normal, no pedal edema, no clubbing or cyanosis  Skin - normal coloration and turgor, no rashes, no suspicious skin lesions noted  Psych- appropriate mood and affect    Lab  No results for input(s): CKTOTAL, CKMB, CKMBINDEX, TROPONINI in the last 72 hours.   CBC:   Lab Results   Component Value Date    WBC 7.9 01/30/2022    RBC 4.31 01/30/2022    HGB 13.3 01/30/2022    HCT 41.0 01/30/2022    MCV 95.1 01/30/2022    MCH 30.9 01/30/2022    MCHC 32.4 01/30/2022    RDW 14.3 01/08/2021     01/30/2022    MPV 10.6 01/30/2022     BMP:    Lab Results   Component Value Date     01/30/2022    K 3.7 01/30/2022     01/30/2022    CO2 25 01/30/2022    BUN 14 01/30/2022    LABALBU 3.0 01/08/2021    CREATININE 0.5 01/30/2022    CALCIUM 9.4 01/30/2022    GFRAA >60 01/08/2021    LABGLOM >90 01/30/2022    GLUCOSE 85 01/30/2022     Hepatic Function Panel:    Lab Results   Component Value Date    ALKPHOS 108 01/08/2021    ALT 17 01/08/2021 you for allowing me to participate in the care of your patient.  Please don't hesitate to contact me regarding any further issues related to the patient care      Terrence Martínez MD

## 2022-02-01 ENCOUNTER — APPOINTMENT (OUTPATIENT)
Dept: NON INVASIVE DIAGNOSTICS | Age: 77
DRG: 519 | End: 2022-02-01
Payer: MEDICARE

## 2022-02-01 LAB
EKG ATRIAL RATE: 62 BPM
EKG P AXIS: 82 DEGREES
EKG P-R INTERVAL: 186 MS
EKG Q-T INTERVAL: 436 MS
EKG QRS DURATION: 124 MS
EKG QTC CALCULATION (BAZETT): 442 MS
EKG R AXIS: -101 DEGREES
EKG T AXIS: 33 DEGREES
EKG VENTRICULAR RATE: 62 BPM

## 2022-02-01 PROCEDURE — 6370000000 HC RX 637 (ALT 250 FOR IP): Performed by: PHYSICIAN ASSISTANT

## 2022-02-01 PROCEDURE — 2580000003 HC RX 258: Performed by: PHYSICIAN ASSISTANT

## 2022-02-01 PROCEDURE — 97116 GAIT TRAINING THERAPY: CPT

## 2022-02-01 PROCEDURE — 6360000002 HC RX W HCPCS

## 2022-02-01 PROCEDURE — 97110 THERAPEUTIC EXERCISES: CPT

## 2022-02-01 PROCEDURE — 1200000000 HC SEMI PRIVATE

## 2022-02-01 PROCEDURE — 96374 THER/PROPH/DIAG INJ IV PUSH: CPT | Performed by: INTERNAL MEDICINE

## 2022-02-01 PROCEDURE — 93017 CV STRESS TEST TRACING ONLY: CPT | Performed by: INTERNAL MEDICINE

## 2022-02-01 PROCEDURE — A9500 TC99M SESTAMIBI: HCPCS | Performed by: ORTHOPAEDIC SURGERY

## 2022-02-01 PROCEDURE — 3430000000 HC RX DIAGNOSTIC RADIOPHARMACEUTICAL: Performed by: ORTHOPAEDIC SURGERY

## 2022-02-01 PROCEDURE — 78452 HT MUSCLE IMAGE SPECT MULT: CPT

## 2022-02-01 RX ADMIN — OXYCODONE 10 MG: 5 TABLET ORAL at 01:39

## 2022-02-01 RX ADMIN — OXYCODONE 10 MG: 5 TABLET ORAL at 16:17

## 2022-02-01 RX ADMIN — POLYETHYLENE GLYCOL (3350) 17 G: 17 POWDER, FOR SOLUTION ORAL at 11:34

## 2022-02-01 RX ADMIN — METOPROLOL TARTRATE 37.5 MG: 25 TABLET, FILM COATED ORAL at 20:10

## 2022-02-01 RX ADMIN — DIGOXIN 125 MCG: 125 TABLET ORAL at 11:25

## 2022-02-01 RX ADMIN — FUROSEMIDE 20 MG: 20 TABLET ORAL at 11:26

## 2022-02-01 RX ADMIN — Medication 32.1 MILLICURIE: at 10:10

## 2022-02-01 RX ADMIN — OXYCODONE 10 MG: 5 TABLET ORAL at 20:11

## 2022-02-01 RX ADMIN — Medication 250 MG: at 11:34

## 2022-02-01 RX ADMIN — ATORVASTATIN CALCIUM 10 MG: 10 TABLET, FILM COATED ORAL at 20:10

## 2022-02-01 RX ADMIN — SODIUM CHLORIDE, PRESERVATIVE FREE 10 ML: 5 INJECTION INTRAVENOUS at 11:26

## 2022-02-01 RX ADMIN — Medication 9.2 MILLICURIE: at 07:50

## 2022-02-01 RX ADMIN — OXYCODONE 5 MG: 5 TABLET ORAL at 11:28

## 2022-02-01 RX ADMIN — PANTOPRAZOLE SODIUM 40 MG: 40 TABLET, DELAYED RELEASE ORAL at 11:26

## 2022-02-01 RX ADMIN — ASPIRIN 81 MG: 81 TABLET, COATED ORAL at 11:25

## 2022-02-01 RX ADMIN — METOPROLOL TARTRATE 37.5 MG: 25 TABLET, FILM COATED ORAL at 11:26

## 2022-02-01 ASSESSMENT — PAIN SCALES - GENERAL
PAINLEVEL_OUTOF10: 2
PAINLEVEL_OUTOF10: 4
PAINLEVEL_OUTOF10: 7
PAINLEVEL_OUTOF10: 7
PAINLEVEL_OUTOF10: 4
PAINLEVEL_OUTOF10: 7

## 2022-02-01 ASSESSMENT — PAIN - FUNCTIONAL ASSESSMENT: PAIN_FUNCTIONAL_ASSESSMENT: ACTIVITIES ARE NOT PREVENTED

## 2022-02-01 ASSESSMENT — PAIN DESCRIPTION - PROGRESSION
CLINICAL_PROGRESSION: NOT CHANGED
CLINICAL_PROGRESSION: NOT CHANGED

## 2022-02-01 ASSESSMENT — PAIN DESCRIPTION - ONSET: ONSET: ON-GOING

## 2022-02-01 ASSESSMENT — PAIN DESCRIPTION - PAIN TYPE: TYPE: CHRONIC PAIN

## 2022-02-01 ASSESSMENT — PAIN DESCRIPTION - LOCATION: LOCATION: BACK

## 2022-02-01 ASSESSMENT — PAIN DESCRIPTION - ORIENTATION: ORIENTATION: POSTERIOR

## 2022-02-01 ASSESSMENT — PAIN DESCRIPTION - DESCRIPTORS: DESCRIPTORS: ACHING

## 2022-02-01 ASSESSMENT — PAIN DESCRIPTION - FREQUENCY: FREQUENCY: CONTINUOUS

## 2022-02-01 NOTE — CARE COORDINATION
Discharge Planning Update:     Ortho and cardio following. Plavix washout. Plan procedure of Friday. PT/OT. Follow for needs.

## 2022-02-01 NOTE — PROGRESS NOTES
[address] [address]  INPATIENT PHYSICAL THERAPY  DAILY NOTE  [facility] ORTHOPEDICS 7K - 7K-07/007-A  Time In: 1319  Time Out: 1347  Timed Code Treatment Minutes: 28 Minutes  Minutes: 28          Date: 2022  Patient Name: Sebastian Huertas,  Gender:  female        MRN: [id number]  : 1945  (77 y.o.)     Referring Practitioner: Hilary Conner PA-C  Diagnosis: spinal stenosis  Additional Pertinent Hx: Per EMR \"Gail is a 77y/o female known to our office having been seen in the past several months with complaints of low back pain and leg pain. She has been through multiple epidural injections, most recently last month, with some mild-moderate relief of her pain. Over the past several days her pain had been worsening significantly to the point it was completely uncontrolled and her mobility had declined significantly. She called our office and the only recommendation was for her to present to the ED for evaluation and admission for further care. She does have a history of previous lumbar spine surgery roughly 20yrs ago. History of pacemaker, afib and is on plavix as well. She describes a sharp pain traveling from the low back into the left lateral/anterior thigh are. No new changes in bowel/bladder continence. \"     Prior Level of Function:  Lives With: Alone  Type of Home: House  Home Layout: One level  Home Access: Stairs to enter without rails  Entrance Stairs - Number of Steps: 2 +1  Home Equipment: Rolling walker,Standard walker,Wheelchair-manual,Cane   Bathroom Shower/Tub: Tub/Shower unit  Bathroom Toilet: Handicap height  Bathroom Equipment:  (countertop on one side)  Bathroom Accessibility: Accessible    Receives Help From:  (friend helps with cleaning.)  ADL Assistance: [address] [place] [address] Avenue: Independent  Homemaking Responsibilities: Yes  Ambulation Assistance: Independent  Transfer Assistance: Independent  Active :  Yes  Additional Comments: Pt IND prior, family in the area to help as standing  Exercise:  Patient was guided in 1 set(s) 15-20 reps of exercise to both lower extremities. Seated marches, Seated hamstring curls, Seated heel/toe raises, Long arc quads, Seated isometric hip adduction and Seated abduction/adduction. Exercises were completed for increased independence with functional mobility. VC and visual cues for proper technique of exercises for completion with good demo, rest breaks as needed due to pain    Functional Outcome Measures: Completed  AM-PAC Inpatient Mobility Raw Score : 18  AM-PAC Inpatient T-Scale Score : 43.63    ASSESSMENT:  Assessment: Patient progressing toward established goals. Pt cont to require 1 person assist for mobility, less knee buckling noted compared to previous session, and improved smoothness of movements with less pain  Activity Tolerance:  Patient tolerance of  treatment: good. Equipment Recommendations:Equipment Needed: No  Discharge Recommendations: Continue to assess pending progress and Patient would benefit from continued PT at discharge  Plan: Times per week: 5x O  Current Treatment Recommendations: Strengthening,Neuromuscular Re-education,Home Exercise Program,Safety Education & Monna Fennel Training,Patient/Caregiver Education & Training,Functional Mobility Training,Equipment Evaluation, Education, & procurement,Transfer Training,Gait Training,Stair training    Patient Education  Patient Education: Plan of Care, Home Exercise Program, [employer] Mobility, Equipment Education, Transfers, Gait, Use of Gait [place], Verbal Exercise Instruction,  - Patient Verbalized Understanding, - Patient Requires Continued Education    Goals:  Patient goals : \"feel better\"  Short term goals  Time Frame for Short term goals: by discharge  Short term goal 1: Pt will demo IND with transfers with LRAD for support to return home safely.   Short term goal 2: Pt will demo IND with gait with LRAD for support with good safety to progress towards PLOF.  Short term goal 3: Pt will negotiate steps for PHYLLIS the home with IND to progress towards PLOF. Short term goal 4: Pt will demo IND with supine to and from sit transfers with good technique to progress with mobility. Long term goals  Time Frame for Long term goals : NA due to short ELOS    Following session, patient left in safe position with all fall risk precautions in place. Pt back in bed following session to relax, all needs and call light in reach with bed alarm on.

## 2022-02-01 NOTE — PROGRESS NOTES
Department of Orthopedic Surgery  Spine Service  Gisele Newman PA-C Progress Note        Subjective:    2/1/22  Paula Lange is resting in the chair. Doing better with pain medication. Cardio consulted, planning stress test today. Holding Plavix with planned surgery Friday possibly pending cardio recs. Vitals  VITALS:  BP (!) 140/76   Pulse 68   Temp 98.5 °F (36.9 °C) (Oral)   Resp 18   Ht 5' 1\" (1.549 m)   Wt 115 lb (52.2 kg)   SpO2 95%   BMI 21.73 kg/m²   24HR INTAKE/OUTPUT:    Intake/Output Summary (Last 24 hours) at 2/1/2022 1393  Last data filed at 2/1/2022 0327  Gross per 24 hour   Intake 1061 ml   Output    Net 1061 ml     URINARY CATHETER OUTPUT (Garcia):     DRAIN/TUBE OUTPUT:         PHYSICAL EXAM:    Orientation:  alert and oriented to person, place and time    Lower Extremity Motor :  quadriceps, extensor hallucis longus, dorsiflexion, plantarflexion 5/5 bilaterally  Lower Extremity Sensory:  Intact L1-S1    Flatus:  positive    ABNORMAL EXAM FINDINGS:  none    LABS:    HgB:    Lab Results   Component Value Date    HGB 13.3 01/30/2022         ASSESSMENT AND PLAN:    Lumbar spinal stenosis  Lumbar radiculopathy  Low back pain     1:  Continue PT/OT  2:  Await cardiac clearance.

## 2022-02-02 PROCEDURE — 6370000000 HC RX 637 (ALT 250 FOR IP): Performed by: PHYSICIAN ASSISTANT

## 2022-02-02 PROCEDURE — 1200000000 HC SEMI PRIVATE

## 2022-02-02 PROCEDURE — 97110 THERAPEUTIC EXERCISES: CPT

## 2022-02-02 PROCEDURE — 2580000003 HC RX 258: Performed by: PHYSICIAN ASSISTANT

## 2022-02-02 PROCEDURE — 99232 SBSQ HOSP IP/OBS MODERATE 35: CPT | Performed by: NURSE PRACTITIONER

## 2022-02-02 PROCEDURE — 97116 GAIT TRAINING THERAPY: CPT

## 2022-02-02 RX ADMIN — OXYCODONE 5 MG: 5 TABLET ORAL at 01:19

## 2022-02-02 RX ADMIN — PANTOPRAZOLE SODIUM 40 MG: 40 TABLET, DELAYED RELEASE ORAL at 06:41

## 2022-02-02 RX ADMIN — FUROSEMIDE 20 MG: 20 TABLET ORAL at 08:03

## 2022-02-02 RX ADMIN — METOPROLOL TARTRATE 37.5 MG: 25 TABLET, FILM COATED ORAL at 08:05

## 2022-02-02 RX ADMIN — METOPROLOL TARTRATE 37.5 MG: 25 TABLET, FILM COATED ORAL at 19:52

## 2022-02-02 RX ADMIN — SODIUM CHLORIDE, PRESERVATIVE FREE 10 ML: 5 INJECTION INTRAVENOUS at 08:07

## 2022-02-02 RX ADMIN — OXYCODONE 10 MG: 5 TABLET ORAL at 11:54

## 2022-02-02 RX ADMIN — ASPIRIN 81 MG: 81 TABLET, COATED ORAL at 08:03

## 2022-02-02 RX ADMIN — ACETAMINOPHEN 650 MG: 325 TABLET ORAL at 01:19

## 2022-02-02 RX ADMIN — DIGOXIN 125 MCG: 125 TABLET ORAL at 08:04

## 2022-02-02 RX ADMIN — POLYETHYLENE GLYCOL (3350) 17 G: 17 POWDER, FOR SOLUTION ORAL at 08:02

## 2022-02-02 RX ADMIN — SODIUM CHLORIDE, PRESERVATIVE FREE 10 ML: 5 INJECTION INTRAVENOUS at 19:53

## 2022-02-02 RX ADMIN — ATORVASTATIN CALCIUM 10 MG: 10 TABLET, FILM COATED ORAL at 19:52

## 2022-02-02 RX ADMIN — OXYCODONE 5 MG: 5 TABLET ORAL at 06:43

## 2022-02-02 ASSESSMENT — PAIN SCALES - GENERAL
PAINLEVEL_OUTOF10: 7
PAINLEVEL_OUTOF10: 6
PAINLEVEL_OUTOF10: 4
PAINLEVEL_OUTOF10: 5

## 2022-02-02 ASSESSMENT — PAIN DESCRIPTION - LOCATION: LOCATION: HIP;GROIN;BACK

## 2022-02-02 ASSESSMENT — PAIN DESCRIPTION - ORIENTATION: ORIENTATION: LEFT

## 2022-02-02 ASSESSMENT — PAIN DESCRIPTION - PAIN TYPE: TYPE: CHRONIC PAIN

## 2022-02-02 NOTE — PROGRESS NOTES
Cardiology Progress Note      Patient:  Lisbeth Borden  YOB: 1945  MRN: [id number]   Acct: [de-identified]  Admit Date:  1/30/2022  Primary Cardiologist: Dr. Toni Poole @ [facility]   Seen by Dr. Don Rebolledo     Per prior cardiology consult note-  REason Of consultation  Pre op eval and recommendation on [facility]     Primary cardiologist Dr. Chuckie Sanches at [facility]     Evelin lemons female patient who known to have CAD, PAF, sss , pacer and rheumatoid arthritis admitted for worsening of back pain for possible surgical management.      Patient has several months history of low back pain that radiates to the left groin and left buttock with radiation down the left leg.  The patient has had epidural injections at [facility] as late as 1/17/2022.  The patient states this weekend her pain was very intense and she was referred to the ED for admission for surgery.     Cardiology consulted for pre op eval and antiplatelet recommendation     Known to have CAD and last ROJELIO was dec 2020 at [address] cp, sob , palpitation     Activity - METS < 4     Limited ambulation due to back pain and RA     Know to have abn ekg and current troponin negative    Subjective (Events in last 24 hours):   Pt up in chair - no cardiac c/o per pt     discussed stress and echo results with her     VSS      Objective:   BP (!) 123/56   Pulse 62   Temp 98.2 °F (36.8 °C) (Axillary)   Resp 18   Ht 5' 1\" (1.549 m)   Wt 115 lb (52.2 kg)   SpO2 94%   BMI 21.73 kg/m²        TELEMETRY: SR no ectopy     Physical Exam:  General Appearance: alert and oriented to person, place and time, in no acute distress  Cardiovascular: normal rate, regular rhythm, normal S1 and S2, no murmurs, rubs, clicks, or gallops, distal pulses intact,   Pulmonary/Chest: clear to auscultation bilaterally- no wheezes, rales or rhonchi, normal air movement, no respiratory distress  Abdomen: soft, non-tender, non-distended, normal bowel sounds, no masses Extremities: no cyanosis, clubbing or edema, pulses present   Musculoskeletal: normal range of motion, no joint swelling, deformity or tenderness  Neurological: alert, oriented, normal speech, no focal findings or movement disorder noted    Medications:    [START ON 2/4/2022] methotrexate  22.5 mg Oral Weekly    digoxin  125 mcg Oral Daily    furosemide  20 mg Oral Daily    magnesium oxide  250 mg Oral Daily    metoprolol tartrate  37.5 mg Oral BID    atorvastatin  10 mg Oral Nightly    pantoprazole  40 mg Oral QAM AC    aspirin EC  81 mg Oral QAM    [Held by provider] clopidogrel  75 mg Oral QAM    sodium chloride flush  5-40 mL IntraVENous 2 times per day    polyethylene glycol  17 g Oral Daily      sodium chloride       nitroGLYCERIN, 0.4 mg, Q5 Min PRN  sodium chloride flush, 5-40 mL, PRN  sodium chloride, 25 mL, PRN  ondansetron, 4 mg, Q8H PRN   Or  ondansetron, 4 mg, Q6H PRN  oxyCODONE, 5 mg, Q4H PRN   Or  oxyCODONE, 10 mg, Q4H PRN  acetaminophen, 650 mg, Q4H PRN  bisacodyl, 10 mg, Daily PRN        Diagnostics:    Echo:   Electronically signed by Yumiko Wakefield MD (Interpreting   physician) on 01/31/2022 at 08:43 PM   ----------------------------------------------------------------      Findings      Mitral Valve   The mitral valve structure was normal with normal leaflet separation. DOPPLER: The transmitral velocity was within the normal range with no   evidence for mitral stenosis. Mild mitral regurgitation is present. Aortic Valve   The aortic valve was trileaflet with normal thickness and cuspal   separation. DOPPLER: Transaortic velocity was within the normal range with   no evidence of aortic stenosis. There was no evidence of aortic   regurgitation. Tricuspid Valve   The tricuspid valve structure was normal with normal leaflet separation. DOPPLER: There was no evidence of tricuspid stenosis. Mild tricuspid   regurgitation visualized.  Right ventricular systolic pressure measures 45   mmhg. Pulmonic Valve   The pulmonic valve leaflets exhibited normal thickness, no calcification,   and normal cuspal separation. DOPPLER: The transpulmonic velocity was   within the normal range with no evidence for regurgitation. Left Atrium   The left atrium is Moderately dilated. Small secundum atrial septal defect with left-to-right shunt was   visualized with velocity of 1.5 m/sec      Left Ventricle   Normal left ventricle size and systolic function. Ejection fraction was   estimated at 55 %. There were no regional left ventricular wall motion   abnormalities and wall thickness was within normal limits. Right Atrium   Mild to Moderately enlarged right atrium size. Right Ventricle   The right ventricular size was normal with normal systolic function and   wall thickness. Pacer Wire visualized in right ventricle. Pericardial Effusion   The pericardium was normal in appearance with no evidence of a pericardial   effusion. Pleural Effusion   No evidence of pleural effusion. Aorta / Great Vessels   -Aortic root dimension within normal limits.   -The Pulmonary artery is within normal limits. -IVC size is within normal limits with normal respiratory phasic changes. Stress:   Summary   Lexiscan EKG stress test is not suggestive for ischemia due to baseline   LBBB   This Nuclear Medicine study was negative for ischemia . Signatures      ----------------------------------------------------------------   Electronically signed by Yumiko Wakefield MD (Interpreting   Cardiologist) on 02/01/2022      Left Heart Cath:     Lab Data:    Cardiac Enzymes:  No results for input(s): CKTOTAL, CKMB, CKMBINDEX, TROPONINI in the last 72 hours.     CBC:   Lab Results   Component Value Date    WBC 7.9 01/30/2022    RBC 4.31 01/30/2022    HGB 13.3 01/30/2022    HCT 41.0 01/30/2022     01/30/2022       CMP:    Lab Results   Component Value Date     01/30/2022    K 3.7 01/30/2022     01/30/2022    CO2 25 01/30/2022    BUN 14 01/30/2022    CREATININE 0.5 01/30/2022    GFRAA >60 01/08/2021    LABGLOM >90 01/30/2022    GLUCOSE 85 01/30/2022    CALCIUM 9.4 01/30/2022       Hepatic Function Panel:    Lab Results   Component Value Date    ALKPHOS 108 01/08/2021    ALT 17 01/08/2021    AST 23 01/08/2021    PROT 6.0 01/08/2021    BILITOT 0.4 01/08/2021    LABALBU 3.0 01/08/2021       Magnesium:  No results found for: MG    PT/INR:    Lab Results   Component Value Date    PROTIME 10.7 03/05/2014    INR 0.99 03/05/2014       HgBA1c:  No results found for: LABA1C    FLP:  No results found for: TRIG, HDL, LDLCALC, LDLDIRECT, LABVLDL    TSH:  No results found for: TSH      Assessment:    Pre-op cardiac risk assessment for Lumbar laminectomy for low back pain with radiculopathy   - METS less than 4    - no CP or angina: stress WNL   - no active CHF or arrhythmia        Hx SSS --> S\p dual PPM 2020  Hx AFB - s\p Ablation 6/2021 [employer]  Hx LAAO device 2020  Hx SAH- s\p fall  2020  CAD: LAD / RCA PCI 2005 - repeat PCI LAD 2009 and 12/2020      Plan:  · Gina Hose for intended surgery at a moderate cardiac risk   · Restart plavix ASAP after surgery   · Please call for any CP - get stat EKG and trop  · Keep SBP greater than 100   · Keep HR less than 120  · Cardiology will sign off - please call for questions or concerns         Electronically signed by MEGHAN Lutz CNP on 2/2/2022 at 10:20 AM

## 2022-02-02 NOTE — PROGRESS NOTES
[facility]  INPATIENT PHYSICAL THERAPY  DAILY NOTE  [facility] ORTHOPEDICS 7K - 7K-07/007-A    Time In: 08  Time Out: 2373  Timed Code Treatment Minutes: 35 Minutes  Minutes: 35          Date: 2022  Patient Name: Monika Altman,  Gender:  female        MRN: [id number]  : 1945  (77 y.o.)     Referring Practitioner: Gisele Newman PA-C  Diagnosis: spinal stenosis  Additional Pertinent Hx: Per EMR \"Gail is a 77y/o female known to our office having been seen in the past several months with complaints of low back pain and leg pain. She has been through multiple epidural injections, most recently last month, with some mild-moderate relief of her pain. Over the past several days her pain had been worsening significantly to the point it was completely uncontrolled and her mobility had declined significantly. She called our office and the only recommendation was for her to present to the ED for evaluation and admission for further care. She does have a history of previous lumbar spine surgery roughly 20yrs ago. History of pacemaker, afib and is on plavix as well. She describes a sharp pain traveling from the low back into the left lateral/anterior thigh are. No new changes in bowel/bladder continence. \"     Prior Level of Function:  Lives With: Alone  Type of Home: House  Home Layout: One level  Home Access: Stairs to enter without rails  Entrance Stairs - Number of Steps: 2 +1  Home Equipment: Rolling walker,Standard walker,Wheelchair-manual,Cane   Bathroom Shower/Tub: Tub/Shower unit  Bathroom Toilet: Handicap height  Bathroom Equipment:  (countertop on one side)  Bathroom Accessibility: Accessible    Receives Help From:  (friend helps with cleaning.)  ADL Assistance: [facility]: Independent  Homemaking Responsibilities: Yes  Ambulation Assistance: Independent  Transfer Assistance: Independent  Active :  Yes  Additional Comments: Pt IND prior, family in the area to help as needed. Pt was using cane about 1-2 weeks prior to admission due to pain    Restrictions/Precautions:  Restrictions/Precautions: General Precautions,Fall Risk  Position Activity Restriction  Other position/activity restrictions: spinal stenosis. potential surgery on 2--04 pending cardiac clearance, pacemaker     SUBJECTIVE: RN Approved session. Pt in recliner upon arrival and agrees to therapy. Pt pleasant and cooperative for session. Pt reporting she should hear back today if she is cleared by cardiology or not for surgery. PAIN: back and hips. Did not quantify    Vitals: Vitals not assessed per clinical judgement, see nursing flowsheet    OBJECTIVE:  Bed Mobility:  Not Tested    Transfers:  Sit to Stand: Stand By Assistance, with increased time for completion  Stand to Sit:Stand By Assistance    Ambulation:  Stand By Assistance, [employer] Assistance  Distance: 60ft  Surface: Level Tile  Device:Rolling Walker  Gait Deviations: Forward Flexed Posture, Slow [place], Decreased Step Length Bilaterally, Decreased Gait Speed, Decreased Heel Strike Bilaterally, Mild Path Deviations, Unsteady Gait, Decreased Terminal Knee Extension and slow and guarded, short shuffled steps, fatigues easily    Exercise:  Patient was guided in 1 set(s) 10 reps of exercise to both lower extremities. Glut sets, Seated marches, Seated heel/toe raises, Long arc quads, Seated isometric hip adduction, Seated abduction/adduction and Scapular retraction. Exercises were completed for increased independence with functional mobility. Functional Outcome Measures: Completed  AM-PAC Inpatient Mobility Raw Score : 18  AM-PAC Inpatient T-Scale Score : 43.63    ASSESSMENT:  Assessment: Patient progressing toward established goals. Activity Tolerance:  Patient tolerance of  treatment: good. Pt tolerated session well. Pt limited by pain and will benefit from cont PT at this time.       Equipment Recommendations:Equipment Needed: No  Discharge Recommendations: Continue to assess pending progress, Patient would benefit from continued PT at discharge and poss sx on fri pending caridology clearance  Plan: Times per week: 5x O  Current Treatment Recommendations: Strengthening,Neuromuscular Re-education,Home Exercise Program,Safety Education & [place] Training,Patient/Caregiver Education & Training,Functional Mobility Training,Equipment Evaluation, Education, & procurement,Transfer Training,Gait Training,Stair training    Patient Education  Patient Education: Plan of Care, [employer] Mobility, Transfers, Gait, Verbal Exercise Instruction    Goals:  Patient goals : \"feel better\"  Short term goals  Time Frame for Short term goals: by discharge  Short term goal 1: Pt will demo IND with transfers with LRAD for support to return home safely. Short term goal 2: Pt will demo IND with gait with LRAD for support with good safety to progress towards PLOF. Short term goal 3: Pt will negotiate steps for PHYLLIS the home with IND to progress towards PLOF. Short term goal 4: Pt will demo IND with supine to and from sit transfers with good technique to progress with mobility. Long term goals  Time Frame for Long term goals : NA due to short ELOS    Following session, patient left in safe position with all fall risk precautions in place.

## 2022-02-02 NOTE — PROGRESS NOTES
3058: Shift assessment complete. VSS. Alert and oriented x4. See flowsheets. Pt assisted CGA with walker to restroom and to chair. Morning medications given per MAR. The care plan and education has been reviewed and mutually agreed upon with the patient. Pt needs expressed, call light within reach. 1200: Pt c/o 7/10 pain to left hip/groin area, prn oxycodone 10 mg given. Pt assisted back to bed.

## 2022-02-02 NOTE — CARE COORDINATION
DISCHARGE ON GOING EVALUATION    [address] day: 3  7K07  Procedure: na  Barriers to Discharge: await cardiac clearance for lumbar back surgery. PT/OT. Plavix washout for 5 days (Friday procedure). Patient Goals/Plan/Treatment Preferences:Gail is  from home alone; follow for rehab needs post op.

## 2022-02-02 NOTE — PROGRESS NOTES
Department of Orthopedic Surgery  Spine Service  Aniket Calvo PA-C Progress Note        Subjective:    2/1/22  Maki Khan is resting in the chair. Doing better with pain medication. Cardio consulted, planning stress test today. Holding Plavix with planned surgery Friday possibly pending cardio recs. 2/2/22  Gail is resting in bed. Doing well. Stress test completed yesterday. Await full cardio recommendation, showed no new ischemia. Likely to proceed with surgery Friday. Vitals  VITALS:  BP (!) 117/50   Pulse 74   Temp 98.4 °F (36.9 °C) (Oral)   Resp 16   Ht 5' 1\" (1.549 m)   Wt 115 lb (52.2 kg)   SpO2 98%   BMI 21.73 kg/m²   24HR INTAKE/OUTPUT:      Intake/Output Summary (Last 24 hours) at 2/2/2022 9108  Last data filed at 2/1/2022 2020  Gross per 24 hour   Intake 740 ml   Output    Net 740 ml     URINARY CATHETER OUTPUT (Garcia):     DRAIN/TUBE OUTPUT:         PHYSICAL EXAM:    Orientation:  alert and oriented to person, place and time    Lower Extremity Motor :  quadriceps, extensor hallucis longus, dorsiflexion, plantarflexion 5/5 bilaterally  Lower Extremity Sensory:  Intact L1-S1    Flatus:  positive    ABNORMAL EXAM FINDINGS:  none    LABS:    HgB:    Lab Results   Component Value Date    HGB 13.3 01/30/2022         ASSESSMENT AND PLAN:    Lumbar spinal stenosis  Lumbar radiculopathy  Low back pain     1:  Continue PT/OT  2:  Await cardiac clearance. no seizure/no headache

## 2022-02-03 PROCEDURE — 97110 THERAPEUTIC EXERCISES: CPT

## 2022-02-03 PROCEDURE — 97116 GAIT TRAINING THERAPY: CPT

## 2022-02-03 PROCEDURE — 1200000000 HC SEMI PRIVATE

## 2022-02-03 PROCEDURE — 6370000000 HC RX 637 (ALT 250 FOR IP): Performed by: PHYSICIAN ASSISTANT

## 2022-02-03 PROCEDURE — 97530 THERAPEUTIC ACTIVITIES: CPT

## 2022-02-03 PROCEDURE — 2580000003 HC RX 258: Performed by: PHYSICIAN ASSISTANT

## 2022-02-03 PROCEDURE — 99223 1ST HOSP IP/OBS HIGH 75: CPT | Performed by: INTERNAL MEDICINE

## 2022-02-03 RX ORDER — SODIUM CHLORIDE 0.9 % (FLUSH) 0.9 %
10 SYRINGE (ML) INJECTION PRN
Status: CANCELLED | OUTPATIENT
Start: 2022-02-03

## 2022-02-03 RX ORDER — SODIUM CHLORIDE 9 MG/ML
25 INJECTION, SOLUTION INTRAVENOUS PRN
Status: CANCELLED | OUTPATIENT
Start: 2022-02-03

## 2022-02-03 RX ORDER — SODIUM CHLORIDE 0.9 % (FLUSH) 0.9 %
10 SYRINGE (ML) INJECTION EVERY 12 HOURS SCHEDULED
Status: CANCELLED | OUTPATIENT
Start: 2022-02-03

## 2022-02-03 RX ADMIN — SODIUM CHLORIDE, PRESERVATIVE FREE 10 ML: 5 INJECTION INTRAVENOUS at 20:27

## 2022-02-03 RX ADMIN — POLYETHYLENE GLYCOL (3350) 17 G: 17 POWDER, FOR SOLUTION ORAL at 09:05

## 2022-02-03 RX ADMIN — OXYCODONE 10 MG: 5 TABLET ORAL at 20:25

## 2022-02-03 RX ADMIN — BISACODYL SUPPOSITORY 10 MG: 10 SUPPOSITORY RECTAL at 10:22

## 2022-02-03 RX ADMIN — OXYCODONE 10 MG: 5 TABLET ORAL at 15:50

## 2022-02-03 RX ADMIN — OXYCODONE 10 MG: 5 TABLET ORAL at 01:00

## 2022-02-03 RX ADMIN — METOPROLOL TARTRATE 37.5 MG: 25 TABLET, FILM COATED ORAL at 09:05

## 2022-02-03 RX ADMIN — ATORVASTATIN CALCIUM 10 MG: 10 TABLET, FILM COATED ORAL at 20:25

## 2022-02-03 RX ADMIN — DIGOXIN 125 MCG: 125 TABLET ORAL at 09:05

## 2022-02-03 RX ADMIN — OXYCODONE 5 MG: 5 TABLET ORAL at 09:05

## 2022-02-03 RX ADMIN — PANTOPRAZOLE SODIUM 40 MG: 40 TABLET, DELAYED RELEASE ORAL at 04:11

## 2022-02-03 RX ADMIN — SODIUM CHLORIDE, PRESERVATIVE FREE 10 ML: 5 INJECTION INTRAVENOUS at 09:04

## 2022-02-03 RX ADMIN — ACETAMINOPHEN 650 MG: 325 TABLET ORAL at 09:05

## 2022-02-03 RX ADMIN — ACETAMINOPHEN 650 MG: 325 TABLET ORAL at 01:01

## 2022-02-03 RX ADMIN — METOPROLOL TARTRATE 37.5 MG: 25 TABLET, FILM COATED ORAL at 20:25

## 2022-02-03 RX ADMIN — Medication 250 MG: at 09:06

## 2022-02-03 RX ADMIN — ASPIRIN 81 MG: 81 TABLET, COATED ORAL at 09:05

## 2022-02-03 RX ADMIN — FUROSEMIDE 20 MG: 20 TABLET ORAL at 09:05

## 2022-02-03 ASSESSMENT — PAIN SCALES - GENERAL
PAINLEVEL_OUTOF10: 5
PAINLEVEL_OUTOF10: 5
PAINLEVEL_OUTOF10: 6
PAINLEVEL_OUTOF10: 10
PAINLEVEL_OUTOF10: 5
PAINLEVEL_OUTOF10: 10
PAINLEVEL_OUTOF10: 3

## 2022-02-03 ASSESSMENT — PAIN DESCRIPTION - ONSET: ONSET: ON-GOING

## 2022-02-03 ASSESSMENT — PAIN DESCRIPTION - DESCRIPTORS
DESCRIPTORS: NUMBNESS
DESCRIPTORS: ACHING

## 2022-02-03 ASSESSMENT — PAIN DESCRIPTION - FREQUENCY
FREQUENCY: CONTINUOUS
FREQUENCY: CONTINUOUS

## 2022-02-03 ASSESSMENT — PAIN DESCRIPTION - PAIN TYPE
TYPE: ACUTE PAIN
TYPE: ACUTE PAIN

## 2022-02-03 ASSESSMENT — PAIN DESCRIPTION - ORIENTATION: ORIENTATION: LEFT

## 2022-02-03 ASSESSMENT — PAIN DESCRIPTION - PROGRESSION: CLINICAL_PROGRESSION: NOT CHANGED

## 2022-02-03 ASSESSMENT — PAIN DESCRIPTION - DIRECTION: RADIATING_TOWARDS: DOWN LEFT LEG

## 2022-02-03 ASSESSMENT — PAIN DESCRIPTION - LOCATION
LOCATION: BACK
LOCATION: GROIN

## 2022-02-03 ASSESSMENT — PAIN - FUNCTIONAL ASSESSMENT: PAIN_FUNCTIONAL_ASSESSMENT: ACTIVITIES ARE NOT PREVENTED

## 2022-02-03 NOTE — PROGRESS NOTES
[facility]  INPATIENT PHYSICAL THERAPY  DAILY NOTE  [facility] ORTHOPEDICS 7K - 7K-07/007-A      Time In: 09  Time Out: 3586  Timed Code Treatment Minutes: 23 Minutes  Minutes: 23          Date: 2/3/2022  Patient Name: Danyelle Thapa,  Gender:  female        MRN: [id number]  : 1945  (77 y.o.)     Referring Practitioner: Sandra Young PA-C  Diagnosis: spinal stenosis  Additional Pertinent Hx: Per EMR \"Gail is a 77y/o female known to our office having been seen in the past several months with complaints of low back pain and leg pain. She has been through multiple epidural injections, most recently last month, with some mild-moderate relief of her pain. Over the past several days her pain had been worsening significantly to the point it was completely uncontrolled and her mobility had declined significantly. She called our office and the only recommendation was for her to present to the ED for evaluation and admission for further care. She does have a history of previous lumbar spine surgery roughly 20yrs ago. History of pacemaker, afib and is on plavix as well. She describes a sharp pain traveling from the low back into the left lateral/anterior thigh are. No new changes in bowel/bladder continence. \"     Prior Level of Function:  Lives With: Alone  Type of Home: House  Home Layout: One level  Home Access: Stairs to enter without rails  Entrance Stairs - Number of Steps: 2 +1  Home Equipment: Rolling walker,Standard walker,Wheelchair-manual,Cane   Bathroom Shower/Tub: Tub/Shower unit  Bathroom Toilet: Handicap height  Bathroom Equipment:  (countertop on one side)  Bathroom Accessibility: Accessible    Receives Help From:  (friend helps with cleaning.)  ADL Assistance: [address]: Independent  Homemaking Responsibilities: Yes  Ambulation Assistance: Independent  Transfer Assistance: Independent  Active :  Yes  Additional Comments: Pt IND prior, family in the area to help as needed. Pt was using cane about 1-2 weeks prior to admission due to pain    Restrictions/Precautions:  Restrictions/Precautions: General Precautions,Fall Risk  Position Activity Restriction  Other position/activity restrictions: spinal stenosis. potential surgery on 2--04 pending cardiac clearance, pacemaker       SUBJECTIVE: nursing ok'd therapy reported sx is scheduled for tomorrow, pt up in chair and agreeable for therapy     PAIN: no number given c/o of back pain and left buttock and down left LE along with numbness in left LE - pt also c/o of pain on ant of left foot noted a little edema around pain location of foot     Vitals: Vitals not assessed per clinical judgement, see nursing flowsheet    OBJECTIVE:  Bed Mobility:  Sit to Supine: Stand By Assistance, and SBA to get repositioned in bed      Transfers:  Sit to Stand: Contact Guard Assistance, to SBA   Stand to Sit:Stand By Assistance    Ambulation:  Stand By Assistance  Distance: 110x1  Surface: Level Tile  Device:Rolling Walker  Gait Deviations:  Slow adwoa with lateral trunk shortening and decreased stance at left LE - with decreased heel strike at elana LEs     Balance:  pt standing in bathroom to complete toileting task w/o UE at support wtih CGA     Exercise:  Patient was guided in 1 set(s) 10 reps of exercise to both lower extremities. Ankle pumps and noted decreased active df at left vs right, long arc qauds . Exercises were completed for increased independence with functional mobility. Functional Outcome Measures: Completed  -PAC Inpatient Mobility Raw Score : 18  -PAC Inpatient T-Scale Score : 43.63    ASSESSMENT:  Assessment: Patient progressing toward established goals. and Pt cont to experience pain adn numbness in left LE- pt planning to have sx tomorrow  Activity Tolerance:  Patient tolerance of  treatment: fair.         Equipment Recommendations:Equipment Needed: No  Discharge Recommendations: Pending how she does following her back sx tomorrow   Plan: Times per week: 5x O  Current Treatment Recommendations: Strengthening,Neuromuscular Re-education,Home Exercise Program,Safety Education & [place] Training,Patient/Caregiver Education & Training,Functional Mobility Training,Equipment Evaluation, Education, & procurement,Transfer Training,Gait Training,Stair training    Patient Education  Patient Education: Plan of Care    Goals:  Patient goals : \"feel better\"  Short term goals  Time Frame for Short term goals: by discharge  Short term goal 1: Pt will demo IND with transfers with LRAD for support to return home safely. Short term goal 2: Pt will demo IND with gait with LRAD for support with good safety to progress towards PLOF. Short term goal 3: Pt will negotiate steps for PHYLLIS the home with IND to progress towards PLOF. Short term goal 4: Pt will demo IND with supine to and from sit transfers with good technique to progress with mobility. Long term goals  Time Frame for Long term goals : NA due to short ELOS    Following session, patient left in safe position with all fall risk precautions in place.
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
## 2022-02-03 NOTE — PROGRESS NOTES
[address] [facility]  Occupational Therapy  Daily Note  Time:   Time In: 1416  Time Out: 1439  Timed Code Treatment Minutes: 23 Minutes  Minutes: 23          Date: 2/3/2022  Patient Name: Jorge Acosta,   Gender: female      Room: [facility]-A  MRN: [id number]  : 1945  (77 y.o.)  Referring Practitioner: JEROME Polanco PA-C  Diagnosis: spinal stenosis  Additional Pertinent Hx: Lav[age] y.o. female who we are asked to see/evaluate by Donny Barragan MD for medical management of rheumatoid arthritis. Cardiology has been consulted on cardiology related matters. Patient has several months history of low back pain that radiates to the left groin and left buttock with radiation down the left leg. The patient has had epidural injections at [facility] as late as 2022. The patient states this weekend her pain was very intense and she was referred to the ED for admission for surgery. Restrictions/Precautions:  Restrictions/Precautions: General Precautions,Fall Risk  Position Activity Restriction  Other position/activity restrictions: spinal stenosis. SUBJECTIVE: Pt seated in bedside chair upon arrival, agreeable to OT session. Pt reported had recently finished taking shower, with RN reporting pt did not need much assistance to complete. Pt scheduled for lumbar laminectomy tomorrow ~12 PM per RN. PAIN: Pt c/o pain in lower back, radiating down through LLE although did not quantify. Vitals: Vitals not assessed per clinical judgement, see nursing flowsheet    COGNITION: WFL    ADL:   No ADL's completed this session. Miriam Siegel BALANCE:  Sitting Balance:  Independent. Standing Balance: Stand By Assistance. BED MOBILITY:  Not Tested    TRANSFERS:  Sit to Stand:  Supervision. Stand to Sit: Supervision. FUNCTIONAL MOBILITY:  Assistive Device: Rolling Walker  Assist Level:  Stand By Assistance. Distance: in hallway  Steady without LOB. Moderate cues for upright posture. ADDITIONAL ACTIVITIES:  Reviewed back precautions following surgery, therapy goals following surgery. Pt declined any questions/concerns at this time. ASSESSMENT:     Activity Tolerance:  Patient tolerance of  treatment: good. Discharge Recommendations: Continue to assess pending progress-will reassess following surgery. Equipment Recommendations:    Plan: Times per week: 3-5  Current Treatment Recommendations: Strengthening,Endurance Training,Self-Care / ADL,Patient/Caregiver Education & Training,Balance Training,Pain Management,Home Management Training,Functional Mobility Training,Safety Education & Training    Patient Education  Patient Education: Role of OT, Plan of Care, Precautions and Importance of Increasing Activity    Goals  Short term goals  Time Frame for Short term goals: by discharge pt will  Short term goal 1: complete ADL routine with S and no cues for back safety, AE PRN  Short term goal 2: tolerate standing  x 5 min with S to increase endurance for sinkside ADL routine  Short term goal 3: ambulate to/ from the BR as well as around obstacles with S and AE PRN to increase endurance / tolerance for home mobility  Short term goal 4: complete simulated homemaking tasks with SBA and no cues for back safety  Long term goals  Time Frame for Long term goals : not est due to est short LOS    Following session, patient left in safe position with all fall risk precautions in place.

## 2022-02-04 ENCOUNTER — APPOINTMENT (OUTPATIENT)
Dept: GENERAL RADIOLOGY | Age: 77
DRG: 519 | End: 2022-02-04
Payer: MEDICARE

## 2022-02-04 ENCOUNTER — ANESTHESIA (OUTPATIENT)
Dept: OPERATING ROOM | Age: 77
DRG: 519 | End: 2022-02-04
Payer: MEDICARE

## 2022-02-04 ENCOUNTER — ANESTHESIA EVENT (OUTPATIENT)
Dept: OPERATING ROOM | Age: 77
DRG: 519 | End: 2022-02-04
Payer: MEDICARE

## 2022-02-04 VITALS — TEMPERATURE: 98.8 F | SYSTOLIC BLOOD PRESSURE: 118 MMHG | OXYGEN SATURATION: 99 % | DIASTOLIC BLOOD PRESSURE: 58 MMHG

## 2022-02-04 PROCEDURE — 72020 X-RAY EXAM OF SPINE 1 VIEW: CPT

## 2022-02-04 PROCEDURE — 3700000001 HC ADD 15 MINUTES (ANESTHESIA): Performed by: ORTHOPAEDIC SURGERY

## 2022-02-04 PROCEDURE — 3600000004 HC SURGERY LEVEL 4 BASE: Performed by: ORTHOPAEDIC SURGERY

## 2022-02-04 PROCEDURE — 6360000002 HC RX W HCPCS: Performed by: ANESTHESIOLOGY

## 2022-02-04 PROCEDURE — 0SB20ZZ EXCISION OF LUMBAR VERTEBRAL DISC, OPEN APPROACH: ICD-10-PCS | Performed by: ORTHOPAEDIC SURGERY

## 2022-02-04 PROCEDURE — 7100000000 HC PACU RECOVERY - FIRST 15 MIN: Performed by: ORTHOPAEDIC SURGERY

## 2022-02-04 PROCEDURE — 6370000000 HC RX 637 (ALT 250 FOR IP): Performed by: PHYSICIAN ASSISTANT

## 2022-02-04 PROCEDURE — 2500000003 HC RX 250 WO HCPCS: Performed by: ORTHOPAEDIC SURGERY

## 2022-02-04 PROCEDURE — 6360000002 HC RX W HCPCS: Performed by: PHYSICIAN ASSISTANT

## 2022-02-04 PROCEDURE — 2709999900 HC NON-CHARGEABLE SUPPLY: Performed by: ORTHOPAEDIC SURGERY

## 2022-02-04 PROCEDURE — 2580000003 HC RX 258: Performed by: PHYSICIAN ASSISTANT

## 2022-02-04 PROCEDURE — 2720000010 HC SURG SUPPLY STERILE: Performed by: ORTHOPAEDIC SURGERY

## 2022-02-04 PROCEDURE — 00QT0ZZ REPAIR SPINAL MENINGES, OPEN APPROACH: ICD-10-PCS | Performed by: ORTHOPAEDIC SURGERY

## 2022-02-04 PROCEDURE — 6360000002 HC RX W HCPCS: Performed by: NURSE ANESTHETIST, CERTIFIED REGISTERED

## 2022-02-04 PROCEDURE — 3600000014 HC SURGERY LEVEL 4 ADDTL 15MIN: Performed by: ORTHOPAEDIC SURGERY

## 2022-02-04 PROCEDURE — 01NB0ZZ RELEASE LUMBAR NERVE, OPEN APPROACH: ICD-10-PCS | Performed by: ORTHOPAEDIC SURGERY

## 2022-02-04 PROCEDURE — 51798 US URINE CAPACITY MEASURE: CPT

## 2022-02-04 PROCEDURE — 2500000003 HC RX 250 WO HCPCS: Performed by: NURSE ANESTHETIST, CERTIFIED REGISTERED

## 2022-02-04 PROCEDURE — 7100000001 HC PACU RECOVERY - ADDTL 15 MIN: Performed by: ORTHOPAEDIC SURGERY

## 2022-02-04 PROCEDURE — 3700000000 HC ANESTHESIA ATTENDED CARE: Performed by: ORTHOPAEDIC SURGERY

## 2022-02-04 PROCEDURE — 2580000003 HC RX 258: Performed by: NURSE ANESTHETIST, CERTIFIED REGISTERED

## 2022-02-04 PROCEDURE — 1200000000 HC SEMI PRIVATE

## 2022-02-04 DEVICE — DURASEAL® EXACT SPINAL SEALANT SYSTEM 3ML 5 PACK
Type: IMPLANTABLE DEVICE | Site: SPINE LUMBAR | Status: FUNCTIONAL
Brand: DURASEAL EXACT SPINAL SEALANT SYSTEM

## 2022-02-04 RX ORDER — LIDOCAINE HYDROCHLORIDE AND EPINEPHRINE 10; 10 MG/ML; UG/ML
INJECTION, SOLUTION INFILTRATION; PERINEURAL PRN
Status: DISCONTINUED | OUTPATIENT
Start: 2022-02-04 | End: 2022-02-04 | Stop reason: ALTCHOICE

## 2022-02-04 RX ORDER — ONDANSETRON 4 MG/1
4 TABLET, ORALLY DISINTEGRATING ORAL EVERY 8 HOURS PRN
Status: DISCONTINUED | OUTPATIENT
Start: 2022-02-04 | End: 2022-02-11 | Stop reason: HOSPADM

## 2022-02-04 RX ORDER — POLYETHYLENE GLYCOL 3350 17 G/17G
17 POWDER, FOR SOLUTION ORAL DAILY
Status: DISCONTINUED | OUTPATIENT
Start: 2022-02-04 | End: 2022-02-11 | Stop reason: HOSPADM

## 2022-02-04 RX ORDER — SENNA AND DOCUSATE SODIUM 50; 8.6 MG/1; MG/1
1 TABLET, FILM COATED ORAL 2 TIMES DAILY
Status: DISCONTINUED | OUTPATIENT
Start: 2022-02-04 | End: 2022-02-11 | Stop reason: HOSPADM

## 2022-02-04 RX ORDER — FENTANYL CITRATE 50 UG/ML
25 INJECTION, SOLUTION INTRAMUSCULAR; INTRAVENOUS EVERY 5 MIN PRN
Status: DISCONTINUED | OUTPATIENT
Start: 2022-02-04 | End: 2022-02-04 | Stop reason: HOSPADM

## 2022-02-04 RX ORDER — FENTANYL CITRATE 50 UG/ML
INJECTION, SOLUTION INTRAMUSCULAR; INTRAVENOUS PRN
Status: DISCONTINUED | OUTPATIENT
Start: 2022-02-04 | End: 2022-02-04 | Stop reason: SDUPTHER

## 2022-02-04 RX ORDER — OXYCODONE HYDROCHLORIDE AND ACETAMINOPHEN 5; 325 MG/1; MG/1
1 TABLET ORAL EVERY 4 HOURS PRN
Status: DISCONTINUED | OUTPATIENT
Start: 2022-02-04 | End: 2022-02-05

## 2022-02-04 RX ORDER — MIDAZOLAM HYDROCHLORIDE 1 MG/ML
INJECTION INTRAMUSCULAR; INTRAVENOUS PRN
Status: DISCONTINUED | OUTPATIENT
Start: 2022-02-04 | End: 2022-02-04 | Stop reason: SDUPTHER

## 2022-02-04 RX ORDER — PROMETHAZINE HYDROCHLORIDE 25 MG/ML
12.5 INJECTION, SOLUTION INTRAMUSCULAR; INTRAVENOUS
Status: DISCONTINUED | OUTPATIENT
Start: 2022-02-04 | End: 2022-02-04 | Stop reason: HOSPADM

## 2022-02-04 RX ORDER — OXYCODONE HYDROCHLORIDE AND ACETAMINOPHEN 5; 325 MG/1; MG/1
2 TABLET ORAL EVERY 4 HOURS PRN
Status: DISCONTINUED | OUTPATIENT
Start: 2022-02-04 | End: 2022-02-05

## 2022-02-04 RX ORDER — MEPERIDINE HYDROCHLORIDE 25 MG/ML
12.5 INJECTION INTRAMUSCULAR; INTRAVENOUS; SUBCUTANEOUS EVERY 5 MIN PRN
Status: DISCONTINUED | OUTPATIENT
Start: 2022-02-04 | End: 2022-02-04 | Stop reason: HOSPADM

## 2022-02-04 RX ORDER — SODIUM CHLORIDE 9 MG/ML
INJECTION, SOLUTION INTRAVENOUS CONTINUOUS
Status: DISCONTINUED | OUTPATIENT
Start: 2022-02-04 | End: 2022-02-10

## 2022-02-04 RX ORDER — SODIUM CHLORIDE 9 MG/ML
25 INJECTION, SOLUTION INTRAVENOUS PRN
Status: DISCONTINUED | OUTPATIENT
Start: 2022-02-04 | End: 2022-02-11 | Stop reason: HOSPADM

## 2022-02-04 RX ORDER — PROPOFOL 10 MG/ML
INJECTION, EMULSION INTRAVENOUS PRN
Status: DISCONTINUED | OUTPATIENT
Start: 2022-02-04 | End: 2022-02-04 | Stop reason: SDUPTHER

## 2022-02-04 RX ORDER — ROCURONIUM BROMIDE 10 MG/ML
INJECTION, SOLUTION INTRAVENOUS PRN
Status: DISCONTINUED | OUTPATIENT
Start: 2022-02-04 | End: 2022-02-04 | Stop reason: SDUPTHER

## 2022-02-04 RX ORDER — LABETALOL 20 MG/4 ML (5 MG/ML) INTRAVENOUS SYRINGE
5 EVERY 10 MIN PRN
Status: DISCONTINUED | OUTPATIENT
Start: 2022-02-04 | End: 2022-02-04 | Stop reason: HOSPADM

## 2022-02-04 RX ORDER — LIDOCAINE HCL/PF 100 MG/5ML
SYRINGE (ML) INJECTION PRN
Status: DISCONTINUED | OUTPATIENT
Start: 2022-02-04 | End: 2022-02-04 | Stop reason: SDUPTHER

## 2022-02-04 RX ORDER — SUCCINYLCHOLINE CHLORIDE 20 MG/ML
INJECTION INTRAMUSCULAR; INTRAVENOUS PRN
Status: DISCONTINUED | OUTPATIENT
Start: 2022-02-04 | End: 2022-02-04 | Stop reason: SDUPTHER

## 2022-02-04 RX ORDER — FENTANYL CITRATE 50 UG/ML
50 INJECTION, SOLUTION INTRAMUSCULAR; INTRAVENOUS EVERY 5 MIN PRN
Status: DISCONTINUED | OUTPATIENT
Start: 2022-02-04 | End: 2022-02-04 | Stop reason: HOSPADM

## 2022-02-04 RX ORDER — BISACODYL 10 MG
10 SUPPOSITORY, RECTAL RECTAL DAILY PRN
Status: DISCONTINUED | OUTPATIENT
Start: 2022-02-04 | End: 2022-02-11 | Stop reason: HOSPADM

## 2022-02-04 RX ORDER — CYCLOBENZAPRINE HCL 10 MG
10 TABLET ORAL 3 TIMES DAILY PRN
Status: DISCONTINUED | OUTPATIENT
Start: 2022-02-04 | End: 2022-02-05

## 2022-02-04 RX ORDER — EPHEDRINE SULFATE/0.9% NACL/PF 50 MG/5 ML
SYRINGE (ML) INTRAVENOUS PRN
Status: DISCONTINUED | OUTPATIENT
Start: 2022-02-04 | End: 2022-02-04 | Stop reason: SDUPTHER

## 2022-02-04 RX ORDER — SODIUM CHLORIDE 0.9 % (FLUSH) 0.9 %
5-40 SYRINGE (ML) INJECTION EVERY 12 HOURS SCHEDULED
Status: DISCONTINUED | OUTPATIENT
Start: 2022-02-04 | End: 2022-02-11 | Stop reason: HOSPADM

## 2022-02-04 RX ORDER — SODIUM CHLORIDE 0.9 % (FLUSH) 0.9 %
5-40 SYRINGE (ML) INJECTION PRN
Status: DISCONTINUED | OUTPATIENT
Start: 2022-02-04 | End: 2022-02-11 | Stop reason: HOSPADM

## 2022-02-04 RX ORDER — ONDANSETRON 2 MG/ML
4 INJECTION INTRAMUSCULAR; INTRAVENOUS EVERY 6 HOURS PRN
Status: DISCONTINUED | OUTPATIENT
Start: 2022-02-04 | End: 2022-02-11 | Stop reason: HOSPADM

## 2022-02-04 RX ADMIN — Medication 60 MG: at 07:44

## 2022-02-04 RX ADMIN — OXYCODONE 10 MG: 5 TABLET ORAL at 00:34

## 2022-02-04 RX ADMIN — ACETAMINOPHEN 650 MG: 325 TABLET ORAL at 00:34

## 2022-02-04 RX ADMIN — CYCLOBENZAPRINE 10 MG: 10 TABLET, FILM COATED ORAL at 15:44

## 2022-02-04 RX ADMIN — SENNOSIDES AND DOCUSATE SODIUM 1 TABLET: 50; 8.6 TABLET ORAL at 21:37

## 2022-02-04 RX ADMIN — BISACODYL 5 MG: 5 TABLET ORAL at 21:37

## 2022-02-04 RX ADMIN — SODIUM CHLORIDE, PRESERVATIVE FREE 10 ML: 5 INJECTION INTRAVENOUS at 21:38

## 2022-02-04 RX ADMIN — SODIUM CHLORIDE: 9 INJECTION, SOLUTION INTRAVENOUS at 07:25

## 2022-02-04 RX ADMIN — PROPOFOL 140 MG: 10 INJECTION, EMULSION INTRAVENOUS at 07:44

## 2022-02-04 RX ADMIN — FENTANYL CITRATE 50 MCG: 50 INJECTION, SOLUTION INTRAMUSCULAR; INTRAVENOUS at 08:00

## 2022-02-04 RX ADMIN — MIDAZOLAM 1 MG: 1 INJECTION INTRAMUSCULAR; INTRAVENOUS at 07:44

## 2022-02-04 RX ADMIN — ROCURONIUM BROMIDE 5 MG: 10 INJECTION INTRAVENOUS at 08:18

## 2022-02-04 RX ADMIN — PHENYLEPHRINE HYDROCHLORIDE 100 MCG: 10 INJECTION INTRAVENOUS at 08:55

## 2022-02-04 RX ADMIN — ATORVASTATIN CALCIUM 10 MG: 10 TABLET, FILM COATED ORAL at 21:37

## 2022-02-04 RX ADMIN — CEFAZOLIN 2000 MG: 10 INJECTION, POWDER, FOR SOLUTION INTRAVENOUS at 15:49

## 2022-02-04 RX ADMIN — ROCURONIUM BROMIDE 10 MG: 10 INJECTION INTRAVENOUS at 08:05

## 2022-02-04 RX ADMIN — PHENYLEPHRINE HYDROCHLORIDE 100 MCG: 10 INJECTION INTRAVENOUS at 07:55

## 2022-02-04 RX ADMIN — PHENYLEPHRINE HYDROCHLORIDE 100 MCG: 10 INJECTION INTRAVENOUS at 08:32

## 2022-02-04 RX ADMIN — SODIUM CHLORIDE: 9 INJECTION, SOLUTION INTRAVENOUS at 21:34

## 2022-02-04 RX ADMIN — PHENYLEPHRINE HYDROCHLORIDE 100 MCG: 10 INJECTION INTRAVENOUS at 08:41

## 2022-02-04 RX ADMIN — SUCCINYLCHOLINE CHLORIDE 120 MG: 20 INJECTION, SOLUTION INTRAMUSCULAR; INTRAVENOUS at 07:46

## 2022-02-04 RX ADMIN — PHENYLEPHRINE HYDROCHLORIDE 200 MCG: 10 INJECTION INTRAVENOUS at 09:24

## 2022-02-04 RX ADMIN — PHENYLEPHRINE HYDROCHLORIDE 100 MCG: 10 INJECTION INTRAVENOUS at 08:38

## 2022-02-04 RX ADMIN — PHENYLEPHRINE HYDROCHLORIDE 100 MCG: 10 INJECTION INTRAVENOUS at 09:07

## 2022-02-04 RX ADMIN — FENTANYL CITRATE 25 MCG: 50 INJECTION, SOLUTION INTRAMUSCULAR; INTRAVENOUS at 10:20

## 2022-02-04 RX ADMIN — ROCURONIUM BROMIDE 10 MG: 10 INJECTION INTRAVENOUS at 08:25

## 2022-02-04 RX ADMIN — OXYCODONE AND ACETAMINOPHEN 2 TABLET: 5; 325 TABLET ORAL at 19:07

## 2022-02-04 RX ADMIN — METOPROLOL TARTRATE 37.5 MG: 25 TABLET, FILM COATED ORAL at 21:35

## 2022-02-04 RX ADMIN — PHENYLEPHRINE HYDROCHLORIDE 100 MCG: 10 INJECTION INTRAVENOUS at 08:44

## 2022-02-04 RX ADMIN — PHENYLEPHRINE HYDROCHLORIDE 100 MCG: 10 INJECTION INTRAVENOUS at 09:11

## 2022-02-04 RX ADMIN — CEFAZOLIN 2000 MG: 10 INJECTION, POWDER, FOR SOLUTION INTRAVENOUS at 22:55

## 2022-02-04 RX ADMIN — ONDANSETRON HYDROCHLORIDE 4 MG: 4 INJECTION, SOLUTION INTRAMUSCULAR; INTRAVENOUS at 07:53

## 2022-02-04 RX ADMIN — FENTANYL CITRATE 25 MCG: 50 INJECTION, SOLUTION INTRAMUSCULAR; INTRAVENOUS at 10:40

## 2022-02-04 RX ADMIN — SODIUM CHLORIDE: 9 INJECTION, SOLUTION INTRAVENOUS at 07:45

## 2022-02-04 RX ADMIN — FENTANYL CITRATE 50 MCG: 50 INJECTION, SOLUTION INTRAMUSCULAR; INTRAVENOUS at 07:45

## 2022-02-04 RX ADMIN — ROCURONIUM BROMIDE 25 MG: 10 INJECTION INTRAVENOUS at 07:52

## 2022-02-04 RX ADMIN — Medication 5 MG: at 07:53

## 2022-02-04 ASSESSMENT — PULMONARY FUNCTION TESTS
PIF_VALUE: 21
PIF_VALUE: 21
PIF_VALUE: 23
PIF_VALUE: 0
PIF_VALUE: 23
PIF_VALUE: 0
PIF_VALUE: 0
PIF_VALUE: 22
PIF_VALUE: 23
PIF_VALUE: 20
PIF_VALUE: 22
PIF_VALUE: 17
PIF_VALUE: 20
PIF_VALUE: 21
PIF_VALUE: 21
PIF_VALUE: 1
PIF_VALUE: 13
PIF_VALUE: 20
PIF_VALUE: 21
PIF_VALUE: 22
PIF_VALUE: 0
PIF_VALUE: 22
PIF_VALUE: 20
PIF_VALUE: 20
PIF_VALUE: 0
PIF_VALUE: 21
PIF_VALUE: 1
PIF_VALUE: 21
PIF_VALUE: 0
PIF_VALUE: 20
PIF_VALUE: 21
PIF_VALUE: 0
PIF_VALUE: 7
PIF_VALUE: 21
PIF_VALUE: 22
PIF_VALUE: 23
PIF_VALUE: 0
PIF_VALUE: 21
PIF_VALUE: 22
PIF_VALUE: 23
PIF_VALUE: 0
PIF_VALUE: 22
PIF_VALUE: 21
PIF_VALUE: 0
PIF_VALUE: 0
PIF_VALUE: 21
PIF_VALUE: 23
PIF_VALUE: 7
PIF_VALUE: 23
PIF_VALUE: 22
PIF_VALUE: 21
PIF_VALUE: 22
PIF_VALUE: 7
PIF_VALUE: 21
PIF_VALUE: 23
PIF_VALUE: 20
PIF_VALUE: 23
PIF_VALUE: 20
PIF_VALUE: 20
PIF_VALUE: 21
PIF_VALUE: 22
PIF_VALUE: 10
PIF_VALUE: 4
PIF_VALUE: 23
PIF_VALUE: 22
PIF_VALUE: 21
PIF_VALUE: 23
PIF_VALUE: 18
PIF_VALUE: 0
PIF_VALUE: 21
PIF_VALUE: 0
PIF_VALUE: 21
PIF_VALUE: 0
PIF_VALUE: 22
PIF_VALUE: 0
PIF_VALUE: 20
PIF_VALUE: 23
PIF_VALUE: 23
PIF_VALUE: 16
PIF_VALUE: 22
PIF_VALUE: 20
PIF_VALUE: 22
PIF_VALUE: 23
PIF_VALUE: 21
PIF_VALUE: 0
PIF_VALUE: 20
PIF_VALUE: 22
PIF_VALUE: 21
PIF_VALUE: 22
PIF_VALUE: 0
PIF_VALUE: 21
PIF_VALUE: 20
PIF_VALUE: 23
PIF_VALUE: 0
PIF_VALUE: 6
PIF_VALUE: 21
PIF_VALUE: 0
PIF_VALUE: 12
PIF_VALUE: 23
PIF_VALUE: 21
PIF_VALUE: 20
PIF_VALUE: 21
PIF_VALUE: 22
PIF_VALUE: 21
PIF_VALUE: 20
PIF_VALUE: 21
PIF_VALUE: 24
PIF_VALUE: 20
PIF_VALUE: 21
PIF_VALUE: 12
PIF_VALUE: 22
PIF_VALUE: 20
PIF_VALUE: 23
PIF_VALUE: 23
PIF_VALUE: 20
PIF_VALUE: 0
PIF_VALUE: 21
PIF_VALUE: 22
PIF_VALUE: 1
PIF_VALUE: 1
PIF_VALUE: 20
PIF_VALUE: 23
PIF_VALUE: 13
PIF_VALUE: 23
PIF_VALUE: 22
PIF_VALUE: 21
PIF_VALUE: 2
PIF_VALUE: 1
PIF_VALUE: 1
PIF_VALUE: 22
PIF_VALUE: 20
PIF_VALUE: 22
PIF_VALUE: 21
PIF_VALUE: 2
PIF_VALUE: 10
PIF_VALUE: 1
PIF_VALUE: 0
PIF_VALUE: 22
PIF_VALUE: 20
PIF_VALUE: 22
PIF_VALUE: 22
PIF_VALUE: 0
PIF_VALUE: 22
PIF_VALUE: 23
PIF_VALUE: 0
PIF_VALUE: 21
PIF_VALUE: 20
PIF_VALUE: 23
PIF_VALUE: 21
PIF_VALUE: 21
PIF_VALUE: 22
PIF_VALUE: 21
PIF_VALUE: 3
PIF_VALUE: 21
PIF_VALUE: 1
PIF_VALUE: 22
PIF_VALUE: 0
PIF_VALUE: 3
PIF_VALUE: 1
PIF_VALUE: 32
PIF_VALUE: 21
PIF_VALUE: 23
PIF_VALUE: 20
PIF_VALUE: 1
PIF_VALUE: 23
PIF_VALUE: 22
PIF_VALUE: 23
PIF_VALUE: 12
PIF_VALUE: 20
PIF_VALUE: 21
PIF_VALUE: 22
PIF_VALUE: 16
PIF_VALUE: 22
PIF_VALUE: 14
PIF_VALUE: 0
PIF_VALUE: 23
PIF_VALUE: 1

## 2022-02-04 ASSESSMENT — PAIN DESCRIPTION - PAIN TYPE
TYPE: SURGICAL PAIN
TYPE: ACUTE PAIN
TYPE: SURGICAL PAIN

## 2022-02-04 ASSESSMENT — PAIN SCALES - GENERAL
PAINLEVEL_OUTOF10: 10
PAINLEVEL_OUTOF10: 7
PAINLEVEL_OUTOF10: 2
PAINLEVEL_OUTOF10: 4
PAINLEVEL_OUTOF10: 2
PAINLEVEL_OUTOF10: 4
PAINLEVEL_OUTOF10: 7

## 2022-02-04 ASSESSMENT — PAIN - FUNCTIONAL ASSESSMENT
PAIN_FUNCTIONAL_ASSESSMENT: PREVENTS OR INTERFERES SOME ACTIVE ACTIVITIES AND ADLS
PAIN_FUNCTIONAL_ASSESSMENT: ACTIVITIES ARE NOT PREVENTED

## 2022-02-04 ASSESSMENT — PAIN DESCRIPTION - PROGRESSION
CLINICAL_PROGRESSION: NOT CHANGED
CLINICAL_PROGRESSION: NOT CHANGED
CLINICAL_PROGRESSION: GRADUALLY IMPROVING
CLINICAL_PROGRESSION: NOT CHANGED

## 2022-02-04 ASSESSMENT — PAIN DESCRIPTION - LOCATION
LOCATION: BACK

## 2022-02-04 ASSESSMENT — PAIN DESCRIPTION - DESCRIPTORS: DESCRIPTORS: SHARP

## 2022-02-04 ASSESSMENT — ENCOUNTER SYMPTOMS: SHORTNESS OF BREATH: 1

## 2022-02-04 ASSESSMENT — PAIN DESCRIPTION - FREQUENCY
FREQUENCY: CONTINUOUS
FREQUENCY: CONTINUOUS

## 2022-02-04 ASSESSMENT — PAIN DESCRIPTION - ONSET
ONSET: ON-GOING
ONSET: ON-GOING

## 2022-02-04 ASSESSMENT — PAIN DESCRIPTION - ORIENTATION: ORIENTATION: LOWER

## 2022-02-04 NOTE — PROGRESS NOTES
Returned from PACU per bed. Alert, oriented. Skin pale pink, warm, dry. Respirations easy. Lungs clear. O2 on at 1 liter/nasal cannula. Abdomen soft, diminished bowel sounds. Back dressing dry and intact. Hemovacs x 2 to back intact, compressed 50%; bloody drainage noted. Hand grasp strong. Pedal push and pull equal and moderate. Denies numbness, tingling, or loss of sensation in extremities. Knee high SCDs in place. IV 0.9NS infusing/left forearm/pump at 100 ml/hour without signs of infiltration (750ml to count). Rails up x 2. Call light within reach. Bed alarm set. Reviewed orders; patient voiced understanding.

## 2022-02-04 NOTE — CARE COORDINATION
DISCHARGE ON GOING EVALUATION    [address] day: 5  7K07  Procedure: lumbar back surgery today  Barriers to Discharge: NPO after MN, consent for OR     Patient Goals/Plan/Treatment Preferences: from From home alone; follow therapy recommendations for rehab needs.

## 2022-02-04 NOTE — H&P
I have reviewed the history and physical and examined the patient. I find no relevant changes. I have reviewed with the patient and/or family members, during the preoperative office visit the risks, benefits, and alternatives to the procedure.     Ochoa Ruiz MD

## 2022-02-04 NOTE — OP NOTE
[address] [place] [address]                                OPERATIVE REPORT    PATIENT NAME: Tay Greer                     :        1945  MED REC NO:   [id number]                           ROOM:       0007  ACCOUNT NO:   [de-identified]                           ADMIT DATE: 2022  PROVIDER:     GWYN Hinson Sport:  2022    PREOPERATIVE DIAGNOSES:  1. Lumbar spinal stenosis. 2.  Left lower extremity radiculopathy. POSTOPERATIVE DIAGNOSES:  1. Lumbar spinal stenosis. 2.  Left lower extremity radiculopathy. PROCEDURES:  L1, L2, L3 and L4 laminectomies and a left side L2-L3  lumbar disk excision. SURGEON:  Keke Serra. MD Jesus    ASSISTANT:  Elgin Collins, physician assistant-certified. ANESTHESIA:  General.    BLOOD LOSS:  250 mL. DRAIN:  Hemovac. COMPLICATION:  Left lateral thecal sac durotomy at level of L4, repaired  by two 4-0 Nurolon sutures. INDICATIONS:  The patient presents. This is a lady who is 77 years of  age, who was admitted to the hospital directly due to severe left side  leg pain, radiculopathy, and difficulty walking. Due to severe symptoms  of pain and immobility, the patient has sought definitive care in the  form of surgical management and I have discussed with her the operation  in detail. This would include a lumbar spine decompressive laminectomy  for release of her spine stenosis and as she is incapacitated because of  her pain, we had her seen by the medical service and she is out of  Plavix as well in preparation for surgery for relief of this  neurocompression with decompressive laminectomy. I discussed the  technique, the recovery and follow up with her in detail and we have  also covered the risks of surgery.   This includes, but not limited to  infection, anesthesia, nerve injury, CSF leak, recurrence of spinal  stenosis, and incomplete recovery of leg comfort. A persistent  radiculopathy was unlikely, but could somewhat linger through neurologic  recovery of her decompression. DESCRIPTION OF PROCEDURE:  We brought the patient to the operating room,  whereupon entry timeout would be observed. Anesthetic was delivered,  airway secured, and Garcia catheter placed and turned prone to a Stew  frame table to be well padded in the prone position. A lumbar spine  prep and drape would then complete with the use of a soap-scrub  solution, sterile toweling, and ChloraPrep solution. We applied sterile  sheets, Magali Scrivener as well. I would josé miguel the levels of L1 down to L4 and  inject 10 mL of 1% lidocaine with epinephrine. I went on to make a skin  josé miguel, inject 10 mL of lidocaine, incise skin while following the  infusion of IV Ancef to completion. A timeout was already observed. We  maintained hemostasis and exposed levels of L1 through L4 in the midline  and we clamped the L3-L4 spinous process level, took x-rays and confirm  levels of surgery. With levels confirmed, we went on to take down the  laminar bone and spinous process right at the levels of L4, L3, L2 and  L1 in the midline and then thin the laminar bone with use of a rj as  well. I went on to decompress the laminectomies and partial medial  facetectomies of levels of L1 to L4, tracing up the exiting nerve root  openings bilateral.  We found significant adhesions at the left level of  L4 and below the L3-L4 disk space along the L4 laminar bone, which was  somewhat adherent from the previous surgery. This was significantly  stenotic as well within the recess under the L4 pedicular level and  required a medial facetectomy. In the course of this decompression, we  did encounter CSF leak that required further bone removal at this level  for clearance of this durotomy and exposure for repair.   Due to this  medial facetectomy, we were able to then expose the traversing left L4  nerve root and go on to repair the thecal sac, where there was CSF leak  with a total of three of the 4-0 Nurolon sutures in a figure-of-eight  pattern. This repair came together very well and showed no evidence of  any further leakage with Valsalva maneuver. The canal was then  thoroughly cleared at this level and we went back up to the levels of  L2-L3 above L3-L4 level and found a significantly large disk herniation  of the left level of L2-L3. This showed some caudal migration and with  gentle protection upon transversing left nerve root sleeve, could  exposed this disk herniation and retrieved several large fragments of  disk with use of the micropituitary rongeur. Now that the canal was  very thoroughly cleared and confirmed to be clear through palpation of  the foramina with the use of [facility] elevator, we then applied a  Fibrillar and dural sealant over the dural repair and went on to close  in layers over the drain. Irrigation was already completed with saline  with initial dressing application after the wound was closed and then  turned a little back to a supine position for her awakening and  extubation and transfer back to recovery. My first assistant was also  Efe Salinas, [facility]. Efe Salinas, PAC, assisted throughout the procedure with positioning,  draping, retraction, wound closure, dressing, and splint application.         Will Krueger M.D.    D: 02/04/2022 9:52:39       T: 02/04/2022 13:15:04     [email or web address]  Job#: [id number]     Doc#: [id number]    CC:

## 2022-02-04 NOTE — PROGRESS NOTES
1004  Pt. Responds to name on adm. To pacu. Back dressing intact and dry.  hemovac drains intact x 2 and compressed 50%. 1008 pt. More awake and restless. Pt. Disoriented to place and situation. 1012  Pt. Oriented to situation. Pt. Continues to be restless. Pt. States \"I'm trying to wake up from anesthesia\". 1015  Pt. Coughing. o2 sat < 90%. o2 per nasal cannula applied at 2 liters. 1020  Pt. States \"I'm trying to get comfortable\". Pt. States she is having \"a little\" pain. Pt. Medicated for pain. 1025  Pt. Resting quietly with eyes closed. 1040  When pt. Is asked if she needs any pain medication pt. States \"yes\". Continue to medicate. 1100  Pt. Continues to rest quietly with eyes closed. Report called to [address] [facility].  1115  Pt. Continues to rest quietly with eyes closed. 1130  Waiting for transportation to room. 1152  pacu criteria met. Transfer to [address] [place] [address] 36 33.

## 2022-02-04 NOTE — BRIEF OP NOTE
Brief Postoperative Note      Patient: Gina Acosta  YOB: 1945  MRN: [id number]    Date of Procedure: 2/4/2022    Pre-Op Diagnosis: LUMBAR SPINAL STENOSIS    Post-Op Diagnosis: Same       Procedure:  L1-L4 lami    Surgeon(s):  Dana Parker MD    Assistant  [facility]    Anesthesia: General    Estimated Blood Loss (mL): 998    Complications: None    Specimens:   * No specimens in log *    Implants:  * No implants in log *      Drains:   Closed/Suction Drain Left Back Accordion 10 [place] (Active)       Closed/Suction Drain Right Back Accordion 10 [place] (Active)       Findings: same    Electronically signed by Dana Parker MD on 2/4/2022 at 9:31 AM
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
## 2022-02-04 NOTE — PROGRESS NOTES
[facility]  PHYSICAL THERAPY MISSED TREATMENT NOTE  [facility] OR    Date: 2022  Patient Name: Rae Sampson        MRN: [id number]   : 1945  (77 y.o.)  Gender: female   Referring Practitioner: Nani Dos Santos PA-C  Diagnosis: spinal stenosis         REASON FOR MISSED TREATMENT:  Patient at testing and/or off unit. Pt having back surgery this AM, will check back sometime after surgery.      Benson Sewell PT, DPT

## 2022-02-05 LAB
BASOPHILS # BLD: 0.2 %
BASOPHILS ABSOLUTE: 0 THOU/MM3 (ref 0–0.1)
EOSINOPHIL # BLD: 0 %
EOSINOPHILS ABSOLUTE: 0 THOU/MM3 (ref 0–0.4)
ERYTHROCYTE [DISTWIDTH] IN BLOOD BY AUTOMATED COUNT: 13.7 % (ref 11.5–14.5)
ERYTHROCYTE [DISTWIDTH] IN BLOOD BY AUTOMATED COUNT: 48.3 FL (ref 35–45)
HCT VFR BLD CALC: 32.4 % (ref 37–47)
HEMOGLOBIN: 10.5 GM/DL (ref 12–16)
IMMATURE GRANS (ABS): 0.07 THOU/MM3 (ref 0–0.07)
IMMATURE GRANULOCYTES: 0.5 %
LYMPHOCYTES # BLD: 12.5 %
LYMPHOCYTES ABSOLUTE: 1.6 THOU/MM3 (ref 1–4.8)
MCH RBC QN AUTO: 31.5 PG (ref 26–33)
MCHC RBC AUTO-ENTMCNC: 32.4 GM/DL (ref 32.2–35.5)
MCV RBC AUTO: 97.3 FL (ref 81–99)
MONOCYTES # BLD: 11.3 %
MONOCYTES ABSOLUTE: 1.4 THOU/MM3 (ref 0.4–1.3)
NUCLEATED RED BLOOD CELLS: 0 /100 WBC
PLATELET # BLD: 198 THOU/MM3 (ref 130–400)
PMV BLD AUTO: 11.1 FL (ref 9.4–12.4)
RBC # BLD: 3.33 MILL/MM3 (ref 4.2–5.4)
SEG NEUTROPHILS: 75.5 %
SEGMENTED NEUTROPHILS ABSOLUTE COUNT: 9.7 THOU/MM3 (ref 1.8–7.7)
WBC # BLD: 12.8 THOU/MM3 (ref 4.8–10.8)

## 2022-02-05 PROCEDURE — 36415 COLL VENOUS BLD VENIPUNCTURE: CPT

## 2022-02-05 PROCEDURE — 2580000003 HC RX 258: Performed by: PHYSICIAN ASSISTANT

## 2022-02-05 PROCEDURE — 6370000000 HC RX 637 (ALT 250 FOR IP): Performed by: PHYSICIAN ASSISTANT

## 2022-02-05 PROCEDURE — 6360000002 HC RX W HCPCS: Performed by: PHYSICIAN ASSISTANT

## 2022-02-05 PROCEDURE — 1200000000 HC SEMI PRIVATE

## 2022-02-05 PROCEDURE — 85025 COMPLETE CBC W/AUTO DIFF WBC: CPT

## 2022-02-05 RX ORDER — DIAZEPAM 5 MG/1
5 TABLET ORAL EVERY 6 HOURS PRN
Status: DISCONTINUED | OUTPATIENT
Start: 2022-02-05 | End: 2022-02-11 | Stop reason: HOSPADM

## 2022-02-05 RX ORDER — OXYCODONE HYDROCHLORIDE 5 MG/1
5 TABLET ORAL EVERY 4 HOURS PRN
Status: DISCONTINUED | OUTPATIENT
Start: 2022-02-05 | End: 2022-02-11 | Stop reason: HOSPADM

## 2022-02-05 RX ORDER — OXYCODONE HYDROCHLORIDE 5 MG/1
10 TABLET ORAL EVERY 4 HOURS PRN
Status: DISCONTINUED | OUTPATIENT
Start: 2022-02-05 | End: 2022-02-11 | Stop reason: HOSPADM

## 2022-02-05 RX ADMIN — SENNOSIDES AND DOCUSATE SODIUM 1 TABLET: 50; 8.6 TABLET ORAL at 08:45

## 2022-02-05 RX ADMIN — SODIUM CHLORIDE: 9 INJECTION, SOLUTION INTRAVENOUS at 19:48

## 2022-02-05 RX ADMIN — FUROSEMIDE 20 MG: 20 TABLET ORAL at 08:45

## 2022-02-05 RX ADMIN — OXYCODONE AND ACETAMINOPHEN 2 TABLET: 5; 325 TABLET ORAL at 08:44

## 2022-02-05 RX ADMIN — SENNOSIDES AND DOCUSATE SODIUM 1 TABLET: 50; 8.6 TABLET ORAL at 20:02

## 2022-02-05 RX ADMIN — CYCLOBENZAPRINE 10 MG: 10 TABLET, FILM COATED ORAL at 05:59

## 2022-02-05 RX ADMIN — METOPROLOL TARTRATE 37.5 MG: 25 TABLET, FILM COATED ORAL at 20:02

## 2022-02-05 RX ADMIN — ONDANSETRON 4 MG: 2 INJECTION INTRAMUSCULAR; INTRAVENOUS at 08:44

## 2022-02-05 RX ADMIN — METOPROLOL TARTRATE 37.5 MG: 25 TABLET, FILM COATED ORAL at 08:45

## 2022-02-05 RX ADMIN — OXYCODONE AND ACETAMINOPHEN 1 TABLET: 5; 325 TABLET ORAL at 03:09

## 2022-02-05 RX ADMIN — POLYETHYLENE GLYCOL 3350 17 G: 17 POWDER, FOR SOLUTION ORAL at 08:45

## 2022-02-05 RX ADMIN — SODIUM CHLORIDE: 9 INJECTION, SOLUTION INTRAVENOUS at 08:46

## 2022-02-05 RX ADMIN — Medication 250 MG: at 08:45

## 2022-02-05 RX ADMIN — BISACODYL 5 MG: 5 TABLET ORAL at 08:45

## 2022-02-05 RX ADMIN — ATORVASTATIN CALCIUM 10 MG: 10 TABLET, FILM COATED ORAL at 20:02

## 2022-02-05 RX ADMIN — NALOXEGOL OXALATE 12.5 MG: 12.5 TABLET, FILM COATED ORAL at 08:45

## 2022-02-05 RX ADMIN — OXYCODONE 10 MG: 5 TABLET ORAL at 20:01

## 2022-02-05 RX ADMIN — PANTOPRAZOLE SODIUM 40 MG: 40 TABLET, DELAYED RELEASE ORAL at 05:59

## 2022-02-05 RX ADMIN — OXYCODONE 10 MG: 5 TABLET ORAL at 13:07

## 2022-02-05 RX ADMIN — DIGOXIN 125 MCG: 125 TABLET ORAL at 08:45

## 2022-02-05 ASSESSMENT — PAIN DESCRIPTION - PROGRESSION
CLINICAL_PROGRESSION: NOT CHANGED
CLINICAL_PROGRESSION: GRADUALLY WORSENING
CLINICAL_PROGRESSION: GRADUALLY WORSENING
CLINICAL_PROGRESSION: GRADUALLY IMPROVING
CLINICAL_PROGRESSION: GRADUALLY IMPROVING

## 2022-02-05 ASSESSMENT — PAIN DESCRIPTION - ONSET
ONSET: ON-GOING

## 2022-02-05 ASSESSMENT — PAIN DESCRIPTION - ORIENTATION
ORIENTATION: LOWER
ORIENTATION: LOWER

## 2022-02-05 ASSESSMENT — PAIN DESCRIPTION - LOCATION
LOCATION: BACK

## 2022-02-05 ASSESSMENT — PAIN SCALES - GENERAL
PAINLEVEL_OUTOF10: 0
PAINLEVEL_OUTOF10: 2
PAINLEVEL_OUTOF10: 8
PAINLEVEL_OUTOF10: 4
PAINLEVEL_OUTOF10: 7
PAINLEVEL_OUTOF10: 0
PAINLEVEL_OUTOF10: 10
PAINLEVEL_OUTOF10: 7

## 2022-02-05 ASSESSMENT — PAIN DESCRIPTION - DESCRIPTORS
DESCRIPTORS: SHARP
DESCRIPTORS: ACHING
DESCRIPTORS: STABBING
DESCRIPTORS: ACHING

## 2022-02-05 ASSESSMENT — PAIN DESCRIPTION - FREQUENCY
FREQUENCY: CONTINUOUS

## 2022-02-05 ASSESSMENT — PAIN DESCRIPTION - PAIN TYPE
TYPE: SURGICAL PAIN

## 2022-02-05 NOTE — PROGRESS NOTES
[facility]  PHYSICAL THERAPY MISSED TREATMENT NOTE  [facility] ORTHOP[facility]ICS 7K    Date: 2022  Patient Name: Gina Acosta        MRN: [id number]   : 1945  (77 y.o.)  Gender: female   Referring Practitioner: Gail Pérez PA-C  Diagnosis: spinal stenosis         REASON FOR MISSED TREATMENT:  Hold treatment per nursing request.      Pt is on bedrest for 48 hours d/t L1-L4 Laminectomy with complication (durotomy) on . Will try back when bedrest orders discontinued on  at 1245.      Jo Lefort PT, DPT

## 2022-02-05 NOTE — PROGRESS NOTES
Department of Orthopedic Surgery  Spine Service  Progress Note        Subjective:    2/1/22  Papito Hopkins is resting in the chair. Doing better with pain medication. Cardio consulted, planning stress test today. Holding Plavix with planned surgery Friday possibly pending cardio recs. 2/2/22  Gail is resting in bed. Doing well. Stress test completed yesterday. Await full cardio recommendation, showed no new ischemia. Likely to proceed with surgery Friday. 2/3/22  Gail is resting in the chair. Surgically cleared by Cardio. Planning surgical intervention tomorrow afternoon. NPO at midnight. 2/4/22 Surgery    2/5/22 POD 1  Gali is resting in bed on bedrest. N with small amounts of vomiting. Believes caused by Percocet. Will d/c and add Kerry panel since patient tolerated in past. Muscle spasms uncontrolled, d/c flexeril, add Valium. Denies HA. Drain output dark red. Relief of LLE radicular symptoms. Will slowly advance activity level with HOB at 30 degrees with HA precautions.      Vitals  VITALS:  BP (!) 186/80   Pulse 73   Temp 97.9 °F (36.6 °C) (Oral)   Resp 18   Ht 5' 1\" (1.549 m)   Wt 115 lb (52.2 kg)   SpO2 100%   BMI 21.73 kg/m²   24HR INTAKE/OUTPUT:      Intake/Output Summary (Last 24 hours) at 2/5/2022 2505  Last data filed at 2/5/2022 0809  Gross per 24 hour   Intake 1318.98 ml   Output 1200 ml   Net 118.98 ml     URINARY CATHETER OUTPUT (Garcia):     DRAIN/TUBE OUTPUT:  Closed/Suction Drain Left Back Accordion 10 British-Output (ml): 0 ml  Closed/Suction Drain Right Back Accordion 10 British-Output (ml): 30 ml      PHYSICAL EXAM:    Orientation:  alert and oriented to person, place and time    Lower Extremity Motor :  quadriceps, extensor hallucis longus, dorsiflexion, plantarflexion 5/5 bilaterally  Lower Extremity Sensory:  Intact L1-S1    Flatus:  positive    ABNORMAL EXAM FINDINGS:  none    LABS:    HgB:    Lab Results   Component Value Date    HGB 10.5 02/05/2022         ASSESSMENT AND PLAN:    POD 1, L1-L4 lami with durotomy leak and repair  Lumbar spinal stenosis  Lumbar radiculopathy  Low back pain     1:  Monitor labs and drain output; 50%  2:  Cardio cleared, appreciate recs  3:  Bedrest; HOB at 30 degrees, HA precautions  4:  Pain uncontrolled; discontinue Perc d/t N, add Kerry panel; discontinue Flexeril, add Valium for muscle spasms. 5: Discharge pending clinical course. Await advancement of activity. 6: Nausea, utilize prn meds  7: Elevated BP, if no improvement after better pain control and N relief, re-consult IM.

## 2022-02-05 NOTE — PLAN OF CARE
Problem: Falls - Risk of:  Goal: Will remain free from falls  Description: Will remain free from falls  2/5/2022 0347 by Lashay Multani RN  Outcome: Ongoing  Note: No falls this shift. Pt using call light appropriately to call for assistance with ambulation to the bathroom and to chair. Pt is also compliant with use of non-skid slippers. Pt reports understanding of fall prevention when discussed. 2/5/2022 0333 by Lashay Multani RN  Outcome: Ongoing     Problem: Falls - Risk of:  Goal: Absence of physical injury  Description: Absence of physical injury  2/5/2022 0347 by Lashay Multani RN  Outcome: Ongoing  Note: Pt free from injury. Using call light appropriately  2/5/2022 0333 by Lashay Multani RN  Outcome: Ongoing     Problem: Pain:  Goal: Pain level will decrease  Description: Pain level will decrease  2/5/2022 0347 by Lashay Multani RN  Outcome: Ongoing  Note: Pt's back pain has been 2-4/10 on pain scale. Pt pain goal is a 5/10. Pt tolerating PO pain meds, turning and repositioning. Pt getting periods of rest  2/5/2022 0333 by Lashay Multani RN  Outcome: Ongoing     Problem: Musculor/Skeletal Functional Status  Goal: Highest potential functional level  2/5/2022 0347 by Lashay Multani RN  Outcome: Ongoing  Note: Pt currently on bedrest. Pt turns and repositions with assist  2/5/2022 0333 by Lashay Multani RN  Outcome: Ongoing     Problem: Skin Integrity:  Goal: Will show no infection signs and symptoms  Description: Will show no infection signs and symptoms  2/5/2022 0347 by Lashay Multani RN  Outcome: Ongoing  Note: Pt has been afebrile. No s/s of infection at surgical site of the back  2/5/2022 0333 by Lashay Multani RN  Outcome: Ongoing     Problem: Skin Integrity:  Goal: Absence of new skin breakdown  Description: Absence of new skin breakdown  2/5/2022 0347 by Lashay Multani RN  Outcome: Ongoing  Note: No new skin breakdown. Back dressing D & I.  Pt turning and repositioning every 2 hours and as needed  2/5/2022 1796 by Olivia Jewell RN  Outcome: Ongoing     Problem: Neurological  Goal: Maximum potential motor/sensory/cognitive function  2/5/2022 0347 by Olivia Jewell RN  Outcome: Ongoing  Note: Pt is A & O X 4. Denies numbness and tingling. Hand grasp and pedal push and pull strong  2/5/2022 0333 by Olivia Jewell RN  Outcome: Ongoing     Problem: Cardiovascular  Goal: No DVT, peripheral vascular complications  6/9/0827 6039 by Olivia Jewell RN  Outcome: Ongoing  Note: No s/s of DVT. Pt compliant with scds  2/5/2022 0333 by Olivia Jewell RN  Outcome: Ongoing     Problem: Respiratory  Goal: No pulmonary complications  9/7/9074 3003 by Olivia Jewell RN  Outcome: Ongoing  Note: Pt currently on room air with O2 sat of 97-98%. Lung sounds clear  2/5/2022 0333 by Olivia Jewell RN  Outcome: Ongoing     Problem: GI  Goal: No bowel complications  8/9/1222 9385 by Olivia Jewell RN  Outcome: Ongoing  Note: No BM this shift. Pt compliant with bowel meds. Active bowel sounds, passing gas  2/5/2022 0333 by Olivia Jewell RN  Outcome: Ongoing     Problem:   Goal: Adequate urinary output  2/5/2022 0347 by Olivia Jewell RN  Outcome: Ongoing  Note: Pt voiding small amounts. Pt had to be straight cathed once this shift. 2/5/2022 0333 by Olivia Jewell RN  Outcome: Ongoing     Problem: Daily Care:  Goal: Daily care needs are met  Description: Daily care needs are met  2/5/2022 0347 by Olivia Jewell RN  Outcome: Ongoing  Note: Pt needs assistance with ADLs  2/5/2022 0333 by Olivia Jewell RN  Outcome: Ongoing     Problem: Discharge Planning:  Goal: Patients continuum of care needs are met  Description: Patients continuum of care needs are met  2/5/2022 0347 by Olivia Jewell RN  Outcome: Ongoing  Note: Pt from home alone, may need rehab before discharge home  2/5/2022 0333 by Olivia Jewell RN  Outcome: Ongoing     Care plan reviewed with patient.   Patient verbalize understanding of the plan of care and contribute to goal setting.

## 2022-02-05 NOTE — PROCEDURES
A Bladder scan was performed at 2217 . The residual amount was measured to be 288 ML. Report of results was given to Zhang Deras.

## 2022-02-05 NOTE — PROGRESS NOTES
[facility]  OCCUPATIONAL THERAPY MISSED TREATMENT NOTE  [facility] ORTHOPEDICS 7K  7K-07/007-A      Date: 2022  Patient Name: Sebastian Huertas        CSN: [id number]   : 1945  (77 y.o.)  Gender: female   Referring Practitioner: JEROME Ramirez PA-C  Diagnosis: spinal stenosis         REASON FOR MISSED TREATMENT: Hold per nursing. Pt is on bedrest for 48 hours d/t L1-L4 Laminectomy on . Will try back when bedrest orders discontinued.

## 2022-02-05 NOTE — PLAN OF CARE
Problem: Falls - Risk of:  Goal: Will remain free from falls  Description: Will remain free from falls  2/5/2022 1405 by Danyel Yen RN  Outcome: Met This Shift  Note: No falls noted this shift. Patient on bedrest. Bed kept in low position. Safe environment maintained. Bedside table & call light in reach. Uses call light appropriately when needing assistance. Problem: Skin Integrity:  Goal: Absence of new skin breakdown  Description: Absence of new skin breakdown  2/5/2022 1405 by Danyel Yen RN  Outcome: Met This Shift  Note: No new skin breakdown this shift     Problem: Cardiovascular  Goal: No DVT, peripheral vascular complications  0/9/5112 5114 by Danyel Yen RN  Outcome: Met This Shift  Note: Pt without s/s of DVT. Pt has SCD,S in place to help prevent development of DVT. Problem:   Goal: Adequate urinary output  2/5/2022 1405 by Danyel Yen RN  Outcome: Met This Shift  Note: Pt voiding adequate amounts without difficulty. Problem: Pain:  Goal: Pain level will decrease  Description: Pain level will decrease  2/5/2022 1405 by Danyel Yen RN  Outcome: Ongoing  Note: Pt report pain at 8-10 on scale. Pt states oral medication helping to achieve pain goal of a 0 on scale. Problem: Musculor/Skeletal Functional Status  Goal: Highest potential functional level  2/5/2022 1405 by Danyel Yen RN  Outcome: Ongoing  Note: Pt is on bedrest.      Problem: Neurological  Goal: Maximum potential motor/sensory/cognitive function  2/5/2022 1405 by Danyel Yen RN  Outcome: Ongoing  Note: Neuro assessment WNL     Problem: GI  Goal: No bowel complications  9/4/3997 6705 by Danyel Yen RN  Outcome: Ongoing  Note: Pt with bowel sounds,  passing flatus, and with some at times nausea.  Taking prescribed medications to assist with BM      Problem: Daily Care:  Goal: Daily care needs are met  Description: Daily care needs are met  2/5/2022 1405 by Danyel Yen RN  Outcome: Ongoing  Note: Pt needs moderate amount of assist when completing ADLs     Problem: Discharge Planning:  Goal: Patients continuum of care needs are met  Description: Patients continuum of care needs are met  2/5/2022 1405 by Danyel Yen RN  Outcome: Ongoing  Note: Pt plans home vs ECF at discharge. Care manager and social working helping with discharge needs. Care plan reviewed with patient. Patient verbalize understanding of the plan of care and contribute to goal setting.

## 2022-02-06 LAB
BASOPHILS # BLD: 0.3 %
BASOPHILS ABSOLUTE: 0 THOU/MM3 (ref 0–0.1)
EOSINOPHIL # BLD: 0.9 %
EOSINOPHILS ABSOLUTE: 0.1 THOU/MM3 (ref 0–0.4)
ERYTHROCYTE [DISTWIDTH] IN BLOOD BY AUTOMATED COUNT: 14.2 % (ref 11.5–14.5)
ERYTHROCYTE [DISTWIDTH] IN BLOOD BY AUTOMATED COUNT: 52.1 FL (ref 35–45)
HCT VFR BLD CALC: 32.1 % (ref 37–47)
HEMOGLOBIN: 9.7 GM/DL (ref 12–16)
IMMATURE GRANS (ABS): 0.04 THOU/MM3 (ref 0–0.07)
IMMATURE GRANULOCYTES: 0.4 %
LYMPHOCYTES # BLD: 27.2 %
LYMPHOCYTES ABSOLUTE: 2.5 THOU/MM3 (ref 1–4.8)
MCH RBC QN AUTO: 30.4 PG (ref 26–33)
MCHC RBC AUTO-ENTMCNC: 30.2 GM/DL (ref 32.2–35.5)
MCV RBC AUTO: 100.6 FL (ref 81–99)
MONOCYTES # BLD: 13.4 %
MONOCYTES ABSOLUTE: 1.2 THOU/MM3 (ref 0.4–1.3)
NUCLEATED RED BLOOD CELLS: 0 /100 WBC
PLATELET # BLD: 192 THOU/MM3 (ref 130–400)
PMV BLD AUTO: 11.1 FL (ref 9.4–12.4)
RBC # BLD: 3.19 MILL/MM3 (ref 4.2–5.4)
SEG NEUTROPHILS: 57.8 %
SEGMENTED NEUTROPHILS ABSOLUTE COUNT: 5.4 THOU/MM3 (ref 1.8–7.7)
WBC # BLD: 9.3 THOU/MM3 (ref 4.8–10.8)

## 2022-02-06 PROCEDURE — 6370000000 HC RX 637 (ALT 250 FOR IP): Performed by: PHYSICIAN ASSISTANT

## 2022-02-06 PROCEDURE — 97164 PT RE-EVAL EST PLAN CARE: CPT

## 2022-02-06 PROCEDURE — 6360000002 HC RX W HCPCS: Performed by: PHYSICIAN ASSISTANT

## 2022-02-06 PROCEDURE — 2580000003 HC RX 258: Performed by: PHYSICIAN ASSISTANT

## 2022-02-06 PROCEDURE — 85025 COMPLETE CBC W/AUTO DIFF WBC: CPT

## 2022-02-06 PROCEDURE — 36415 COLL VENOUS BLD VENIPUNCTURE: CPT

## 2022-02-06 PROCEDURE — 1200000000 HC SEMI PRIVATE

## 2022-02-06 PROCEDURE — 97530 THERAPEUTIC ACTIVITIES: CPT

## 2022-02-06 RX ORDER — HYDRALAZINE HYDROCHLORIDE 20 MG/ML
10 INJECTION INTRAMUSCULAR; INTRAVENOUS EVERY 6 HOURS PRN
Status: DISCONTINUED | OUTPATIENT
Start: 2022-02-06 | End: 2022-02-11 | Stop reason: HOSPADM

## 2022-02-06 RX ORDER — CLOPIDOGREL BISULFATE 75 MG/1
75 TABLET ORAL ONCE
Status: COMPLETED | OUTPATIENT
Start: 2022-02-06 | End: 2022-02-06

## 2022-02-06 RX ADMIN — DIGOXIN 125 MCG: 125 TABLET ORAL at 09:52

## 2022-02-06 RX ADMIN — NALOXEGOL OXALATE 12.5 MG: 12.5 TABLET, FILM COATED ORAL at 09:52

## 2022-02-06 RX ADMIN — BISACODYL 5 MG: 5 TABLET ORAL at 09:33

## 2022-02-06 RX ADMIN — ONDANSETRON 4 MG: 2 INJECTION INTRAMUSCULAR; INTRAVENOUS at 20:00

## 2022-02-06 RX ADMIN — HYDRALAZINE HYDROCHLORIDE 10 MG: 20 INJECTION INTRAMUSCULAR; INTRAVENOUS at 20:43

## 2022-02-06 RX ADMIN — SODIUM CHLORIDE: 9 INJECTION, SOLUTION INTRAVENOUS at 06:26

## 2022-02-06 RX ADMIN — FUROSEMIDE 20 MG: 20 TABLET ORAL at 09:52

## 2022-02-06 RX ADMIN — SENNOSIDES AND DOCUSATE SODIUM 1 TABLET: 50; 8.6 TABLET ORAL at 09:52

## 2022-02-06 RX ADMIN — DIAZEPAM 5 MG: 5 TABLET ORAL at 10:15

## 2022-02-06 RX ADMIN — ONDANSETRON 4 MG: 2 INJECTION INTRAMUSCULAR; INTRAVENOUS at 14:04

## 2022-02-06 RX ADMIN — SENNOSIDES AND DOCUSATE SODIUM 1 TABLET: 50; 8.6 TABLET ORAL at 20:43

## 2022-02-06 RX ADMIN — OXYCODONE 5 MG: 5 TABLET ORAL at 14:34

## 2022-02-06 RX ADMIN — METOPROLOL TARTRATE 37.5 MG: 25 TABLET, FILM COATED ORAL at 09:52

## 2022-02-06 RX ADMIN — SODIUM CHLORIDE: 9 INJECTION, SOLUTION INTRAVENOUS at 16:36

## 2022-02-06 RX ADMIN — METOPROLOL TARTRATE 37.5 MG: 25 TABLET, FILM COATED ORAL at 20:43

## 2022-02-06 RX ADMIN — CLOPIDOGREL BISULFATE 75 MG: 75 TABLET ORAL at 20:43

## 2022-02-06 RX ADMIN — Medication 250 MG: at 09:52

## 2022-02-06 RX ADMIN — POLYETHYLENE GLYCOL 3350 17 G: 17 POWDER, FOR SOLUTION ORAL at 09:53

## 2022-02-06 RX ADMIN — DIAZEPAM 5 MG: 5 TABLET ORAL at 03:53

## 2022-02-06 RX ADMIN — PANTOPRAZOLE SODIUM 40 MG: 40 TABLET, DELAYED RELEASE ORAL at 09:51

## 2022-02-06 RX ADMIN — OXYCODONE 5 MG: 5 TABLET ORAL at 03:53

## 2022-02-06 RX ADMIN — ATORVASTATIN CALCIUM 10 MG: 10 TABLET, FILM COATED ORAL at 20:43

## 2022-02-06 ASSESSMENT — PAIN DESCRIPTION - PAIN TYPE
TYPE: ACUTE PAIN
TYPE: SURGICAL PAIN

## 2022-02-06 ASSESSMENT — PAIN - FUNCTIONAL ASSESSMENT
PAIN_FUNCTIONAL_ASSESSMENT: ACTIVITIES ARE NOT PREVENTED

## 2022-02-06 ASSESSMENT — PAIN DESCRIPTION - ORIENTATION
ORIENTATION: POSTERIOR
ORIENTATION: MID;LOWER;POSTERIOR

## 2022-02-06 ASSESSMENT — PAIN SCALES - GENERAL
PAINLEVEL_OUTOF10: 0
PAINLEVEL_OUTOF10: 4
PAINLEVEL_OUTOF10: 4
PAINLEVEL_OUTOF10: 6
PAINLEVEL_OUTOF10: 0
PAINLEVEL_OUTOF10: 3
PAINLEVEL_OUTOF10: 3

## 2022-02-06 ASSESSMENT — PAIN DESCRIPTION - FREQUENCY
FREQUENCY: CONTINUOUS
FREQUENCY: CONTINUOUS
FREQUENCY: INTERMITTENT
FREQUENCY: CONTINUOUS

## 2022-02-06 ASSESSMENT — PAIN DESCRIPTION - PROGRESSION
CLINICAL_PROGRESSION: GRADUALLY IMPROVING

## 2022-02-06 ASSESSMENT — PAIN DESCRIPTION - DESCRIPTORS
DESCRIPTORS: ACHING
DESCRIPTORS: OTHER (COMMENT)

## 2022-02-06 ASSESSMENT — PAIN DESCRIPTION - LOCATION
LOCATION: BACK
LOCATION: NECK
LOCATION: BACK
LOCATION: BACK

## 2022-02-06 ASSESSMENT — PAIN DESCRIPTION - ONSET
ONSET: OTHER (COMMENT)
ONSET: ON-GOING

## 2022-02-06 NOTE — PLAN OF CARE
Problem: Falls - Risk of:  Goal: Will remain free from falls  Description: Will remain free from falls  2/6/2022 0139 by Jermaine Del Castillo RN  Outcome: Ongoing  Note: No falls this shift. Pt using call light appropriately to call for assistance. Pt reports understanding of fall prevention when discussed. Pt currently on bedrest and understands her restrictions     Problem: Pain:  Goal: Pain level will decrease  Description: Pain level will decrease  2/6/2022 0139 by Jermaine Del Castillo RN  Outcome: Ongoing  Note: Pt reports back pain at 7/10 on scale. Pt states oral/iv/im medication helping to achieve pain goal of a 5 on scale. Pt getting periods of rest     Problem: Musculor/Skeletal Functional Status  Goal: Highest potential functional level  2/6/2022 0139 by Jermaine Del Castillo RN  Outcome: Ongoing  Note: Pt currently on bedrest     Problem: Skin Integrity:  Goal: Absence of new skin breakdown  Description: Absence of new skin breakdown  2/6/2022 0139 by Jermaine Del Castillo RN  Outcome: Ongoing  Note: No new skin breakdown. Pt back dressing D & I. Pt turning and repositioning to prevent skin breakdown     Problem: Neurological  Goal: Maximum potential motor/sensory/cognitive function  2/6/2022 0139 by Jermaine Del Castillo RN  Outcome: Ongoing  Note: Pt A & O X 4. Denies numbness and tingling. Hand grasp, pedal push and pull strong     Problem: Cardiovascular  Goal: No DVT, peripheral vascular complications  3/3/7886 5650 by Jermaine Del Castillo RN  Outcome: Ongoing  Note: No s/s of DVT. Pt compliant with scds     Problem: GI  Goal: No bowel complications  6/7/3564 8473 by Jermaine Del Castillo RN  Outcome: Ongoing  Note: No BM this shift. Active bowel sounds, passing gas.  Taking bowel meds     Problem:   Goal: Adequate urinary output  2/6/2022 0139 by Jermaine Del Castillo RN  Outcome: Ongoing  Note: Voiding adequate amounts     Problem: Daily Care:  Goal: Daily care needs are met  Description: Daily care needs are met  2/6/2022 0139 by Caleb Barajas SUNIL Lezama  Outcome: Ongoing  Note: Pt needs assistance with ADLs     Problem: Discharge Planning:  Goal: Patients continuum of care needs are met  Description: Patients continuum of care needs are met  2/6/2022 0139 by Aleksandr Pimentel RN  Outcome: Ongoing  Note: Pt plans home vs ECF at discharge    Care plan reviewed with patient  Patient verbalize understanding of the plan of care and contribute to goal setting.

## 2022-02-06 NOTE — PROGRESS NOTES
Department of Orthopedic Surgery  Spine Service  Progress Note        Subjective:    2/1/22  Libra Maria is resting in the chair. Doing better with pain medication. Cardio consulted, planning stress test today. Holding Plavix with planned surgery Friday possibly pending cardio recs. 2/2/22  Gail is resting in bed. Doing well. Stress test completed yesterday. Await full cardio recommendation, showed no new ischemia. Likely to proceed with surgery Friday. 2/3/22  Gail is resting in the chair. Surgically cleared by Cardio. Planning surgical intervention tomorrow afternoon. NPO at midnight. 2/4/22 Surgery    2/5/22 POD 1  Gail is resting in bed on bedrest. N with small amounts of vomiting. Believes caused by Percocet. Will d/c and add Kerry panel since patient tolerated in past. Muscle spasms uncontrolled, d/c flexeril, add Valium. Denies HA. Drain output dark red. Relief of LLE radicular symptoms. Will slowly advance activity level with HOB at 30 degrees with HA precautions. 2/6/22 POD 2  Gail is resting in bed with HOB at 30 degrees. No HA. One drain with increased output of 240 and other at 0. Light red color. Pain better controlled with Kerry and Valium. N/V relieved. Continued relief of LLE radicular symptoms. Will restart plavix today.   Continue bedrest, will dangle feet at bedside today with HA precautions    Vitals  VITALS:  BP (!) 148/69   Pulse 65   Temp 97.9 °F (36.6 °C) (Oral)   Resp 18   Ht 5' 1\" (1.549 m)   Wt 115 lb (52.2 kg)   SpO2 96%   BMI 21.73 kg/m²   24HR INTAKE/OUTPUT:      Intake/Output Summary (Last 24 hours) at 2/6/2022 9197  Last data filed at 2/6/2022 4099  Gross per 24 hour   Intake 3167.54 ml   Output 2010 ml   Net 1157.54 ml     URINARY CATHETER OUTPUT (Garcia):     DRAIN/TUBE OUTPUT:  Closed/Suction Drain Left Back Accordion 10 Uzbek-Output (ml): 100 ml  Closed/Suction Drain Right Back Accordion 10 Uzbek-Output (ml): 0 ml      PHYSICAL EXAM:    Orientation:  alert and oriented to person, place and time    Lower Extremity Motor :  quadriceps, extensor hallucis longus, dorsiflexion, plantarflexion 5/5 bilaterally  Lower Extremity Sensory:  Intact L1-S1    Flatus:  positive    ABNORMAL EXAM FINDINGS:  none    LABS:    HgB:    Lab Results   Component Value Date    HGB 9.7 02/06/2022         ASSESSMENT AND PLAN:    POD 2, L1-L4 lami with durotomy leak and repair  Lumbar spinal stenosis  Lumbar radiculopathy  Low back pain     1:  Monitor labs and drain output; 50%  2:  Cardio cleared, appreciate recs  3:  Bedrest; Dangle feet at bedside for meals and as tolerated, HA precautions  4:  Pain better controlled, continue Kerry and Valium   5: Discharge pending clinical course. Await advancement of activity.     6: Nausea, resolved  7: Elevated BP, if no improvement after better pain control and N relief, re-consult IM.  8: Restart Plavix today

## 2022-02-06 NOTE — PROGRESS NOTES
[facility]  INPATIENT PHYSICAL THERAPY  Re- EVALUATION  [facility] ORTHOPEDICS 7K - 7K-07/007-A    Time In: 8775  Time Out: 1514  Timed Code Treatment Minutes: 23 Minutes  Minutes: 30          Date: 2022  Patient Name: Miriam Will,  Gender:  female        MRN: [id number]  : 1945  (77 y.o.)      Referring Practitioner: Valerie Dow PA-C  Diagnosis: spinal stenosis  Additional Pertinent Hx: Per EMR \"Gail is a 77y/o female known to our office having been seen in the past several months with complaints of low back pain and leg pain. She has been through multiple epidural injections, most recently last month, with some mild-moderate relief of her pain. Over the past several days her pain had been worsening significantly to the point it was completely uncontrolled and her mobility had declined significantly. She called our office and the only recommendation was for her to present to the ED for evaluation and admission for further care. She does have a history of previous lumbar spine surgery roughly 20yrs ago. History of pacemaker, afib and is on plavix as well. She describes a sharp pain traveling from the low back into the left lateral/anterior thigh are. No new changes in bowel/bladder continence. \" L1-L4 Laminectomy with complication (durotomy) on      Restrictions/Precautions:  Restrictions/Precautions: General Precautions,Fall Risk,Surgical Protocols  Required Braces or Orthoses  Other: Abdominal Binder  Position Activity Restriction  Spinal Precautions: No Bending,No Lifting,No Twisting  Other position/activity restrictions: L1-L4 Laminectomy with complication (durotomy) on     Subjective:  Chart Reviewed: Yes  Patient assessed for rehabilitation services?: Yes  Family / Caregiver Present: No  Subjective: Ok to see pt per nursing. Nuring requesting PT to get back to bed as pt having ECHO soon. Once pt in bed and starting exercises, imaging present to take ECHO.  PT came back to cont with assessment. General:  Overall Orientation Status: Within Normal Limits  Follows Commands: Within Functional Limits    Vision: Impaired  Vision Exceptions: Wears glasses for distance    Hearing: Within functional limits         Pain: 8/10: back     Vitals: Vitals not assessed per clinical judgement, see nursing flowsheet    Social/Functional History:    Lives With: Alone  Type of Home: House  Home Layout: One level  Home Access: Stairs to enter without rails  Entrance Stairs - Number of Steps: 2 +1  Home Equipment: Rolling walker,Standard walker,Wheelchair-manual,Cane     Bathroom Shower/Tub: Tub/Shower unit  Bathroom Toilet: Handicap height  Bathroom Equipment:  (countertop on one side)  Bathroom Accessibility: Accessible    Receives Help From:  (friend helps with cleaning.)  ADL Assistance: Independent  Homemaking Assistance: Independent  Homemaking Responsibilities: Yes  Ambulation Assistance: Independent  Transfer Assistance: Independent    Active : Yes  Occupation: Retired  Additional Comments: Pt IND prior, family in the area to help as needed. Pt was using cane about 1-2 weeks prior to admission due to pain    OBJECTIVE:  Range of Motion:  Bilateral Lower Extremity: WFL    Strength:  Bilateral Lower Extremity: Impaired - grossly deconditioned     Balance:  Static Sitting Balance:  Stand By Assistance   *pt sitting EOB for 10 min; no c/o headache, lightheaded/dizziness. Pt does c/o some nausea but states has be nauseous all day     Bed Mobility:  Rolling to Left: Contact Guard Assistance, X 1, with rail   Rolling to Right: Contact Guard Assistance, X 1, with rail   Supine to Sit: Minimal Assistance, X 1, with head of bed raised, with rail, with verbal cues   Sit to Supine:  Moderate Assistance, X 1, with head of bed raised, with verbal cues      Transfers:  Not Tested    Ambulation:  Not Tested    Functional Outcome Measures: Completed  AM-PAC Inpatient Mobility Raw Score : 18  AM-PAC Inpatient T-Scale Score : 43.63    ASSESSMENT:  Activity Tolerance:  Patient tolerance of  treatment: good. Treatment Initiated: Treatment and education initiated within context of reevaluation. Re-Evaluation time included review of current medical information, gathering information related to past medical, social and functional history, completion of standardized testing, formal and informal observation of tasks, assessment of data and development of plan of care and goals. Treatment time included skilled education and facilitation of tasks to increase safety and independence with functional mobility for improved independence and quality of life. Assessment: Body structures, Functions, Activity limitations: Decreased functional mobility ,Increased pain,Decreased posture,Decreased balance,Decreased strength,Decreased endurance  Assessment: Pt noted to have increased pain in back, into L buttock and thigh causing overall debility. Pt requires skilled PT services to progress with transfers, increase IND with functional tasks, progress with pain and increase ease with mobility.   Prognosis: Good    REQUIRES PT FOLLOW UP: Yes    Discharge Recommendations:  Discharge Recommendations: Continue to assess pending progress,Patient would benefit from continued therapy after discharge    Patient Education:  PT Education: [address] [place] [address] Mobility Training,Equipment,Transfer Training  Patient Education: d/c planning    Equipment Recommendations:  Equipment Needed: No    Plan:  Times per week: 5x O  Current Treatment Recommendations: Strengthening,Neuromuscular Re-education,Home Exercise Program,Safety Education & Training,Balance Training,Endurance Training,Patient/Caregiver Education & Training,Functional Mobility Training,Equipment Evaluation, Education, & procurement,Transfer Training,Gait Training,Stair training    Goals:  Patient goals : \"feel

## 2022-02-07 LAB
ANION GAP SERPL CALCULATED.3IONS-SCNC: 9 MEQ/L (ref 8–16)
BACTERIA: ABNORMAL /HPF
BASOPHILS # BLD: 0.3 %
BASOPHILS ABSOLUTE: 0 THOU/MM3 (ref 0–0.1)
BILIRUBIN URINE: ABNORMAL
BLOOD, URINE: ABNORMAL
BUN BLDV-MCNC: 8 MG/DL (ref 7–22)
CALCIUM SERPL-MCNC: 8 MG/DL (ref 8.5–10.5)
CASTS 2: ABNORMAL /LPF
CASTS UA: ABNORMAL /LPF
CHARACTER, URINE: ABNORMAL
CHLORIDE BLD-SCNC: 103 MEQ/L (ref 98–111)
CO2: 24 MEQ/L (ref 23–33)
COLOR: ABNORMAL
CREAT SERPL-MCNC: 0.3 MG/DL (ref 0.4–1.2)
CRYSTALS, UA: ABNORMAL
EOSINOPHIL # BLD: 0.6 %
EOSINOPHILS ABSOLUTE: 0.1 THOU/MM3 (ref 0–0.4)
EPITHELIAL CELLS, UA: ABNORMAL /HPF
ERYTHROCYTE [DISTWIDTH] IN BLOOD BY AUTOMATED COUNT: 13.7 % (ref 11.5–14.5)
ERYTHROCYTE [DISTWIDTH] IN BLOOD BY AUTOMATED COUNT: 49.5 FL (ref 35–45)
GFR SERPL CREATININE-BSD FRML MDRD: > 90 ML/MIN/1.73M2
GLUCOSE BLD-MCNC: 87 MG/DL (ref 70–108)
GLUCOSE URINE: NEGATIVE MG/DL
HCT VFR BLD CALC: 32.7 % (ref 37–47)
HEMOGLOBIN: 10.2 GM/DL (ref 12–16)
ICTOTEST: NEGATIVE
IMMATURE GRANS (ABS): 0.03 THOU/MM3 (ref 0–0.07)
IMMATURE GRANULOCYTES: 0.3 %
KETONES, URINE: NEGATIVE
LEUKOCYTE ESTERASE, URINE: ABNORMAL
LYMPHOCYTES # BLD: 19.3 %
LYMPHOCYTES ABSOLUTE: 2.1 THOU/MM3 (ref 1–4.8)
MAGNESIUM: 1.6 MG/DL (ref 1.6–2.4)
MCH RBC QN AUTO: 30.7 PG (ref 26–33)
MCHC RBC AUTO-ENTMCNC: 31.2 GM/DL (ref 32.2–35.5)
MCV RBC AUTO: 98.5 FL (ref 81–99)
MISCELLANEOUS 2: ABNORMAL
MONOCYTES # BLD: 11.7 %
MONOCYTES ABSOLUTE: 1.3 THOU/MM3 (ref 0.4–1.3)
NITRITE, URINE: POSITIVE
NUCLEATED RED BLOOD CELLS: 0 /100 WBC
PH UA: 6.5 (ref 5–9)
PLATELET # BLD: 208 THOU/MM3 (ref 130–400)
PMV BLD AUTO: 11.3 FL (ref 9.4–12.4)
POTASSIUM REFLEX MAGNESIUM: 3.5 MEQ/L (ref 3.5–5.2)
PROTEIN UA: 100
RBC # BLD: 3.32 MILL/MM3 (ref 4.2–5.4)
RBC URINE: > 200 /HPF
RENAL EPITHELIAL, UA: ABNORMAL
SEG NEUTROPHILS: 67.8 %
SEGMENTED NEUTROPHILS ABSOLUTE COUNT: 7.3 THOU/MM3 (ref 1.8–7.7)
SODIUM BLD-SCNC: 136 MEQ/L (ref 135–145)
SPECIFIC GRAVITY, URINE: 1.01 (ref 1–1.03)
UROBILINOGEN, URINE: 1 EU/DL (ref 0–1)
WBC # BLD: 10.8 THOU/MM3 (ref 4.8–10.8)
WBC UA: ABNORMAL /HPF
YEAST: ABNORMAL

## 2022-02-07 PROCEDURE — 6370000000 HC RX 637 (ALT 250 FOR IP): Performed by: PHYSICIAN ASSISTANT

## 2022-02-07 PROCEDURE — 80048 BASIC METABOLIC PNL TOTAL CA: CPT

## 2022-02-07 PROCEDURE — 6360000002 HC RX W HCPCS: Performed by: PHYSICIAN ASSISTANT

## 2022-02-07 PROCEDURE — 97168 OT RE-EVAL EST PLAN CARE: CPT

## 2022-02-07 PROCEDURE — 81001 URINALYSIS AUTO W/SCOPE: CPT

## 2022-02-07 PROCEDURE — 36415 COLL VENOUS BLD VENIPUNCTURE: CPT

## 2022-02-07 PROCEDURE — 85025 COMPLETE CBC W/AUTO DIFF WBC: CPT

## 2022-02-07 PROCEDURE — 87086 URINE CULTURE/COLONY COUNT: CPT

## 2022-02-07 PROCEDURE — 97535 SELF CARE MNGMENT TRAINING: CPT

## 2022-02-07 PROCEDURE — 97110 THERAPEUTIC EXERCISES: CPT

## 2022-02-07 PROCEDURE — 87077 CULTURE AEROBIC IDENTIFY: CPT

## 2022-02-07 PROCEDURE — 97164 PT RE-EVAL EST PLAN CARE: CPT

## 2022-02-07 PROCEDURE — 97530 THERAPEUTIC ACTIVITIES: CPT

## 2022-02-07 PROCEDURE — 99232 SBSQ HOSP IP/OBS MODERATE 35: CPT | Performed by: PHYSICIAN ASSISTANT

## 2022-02-07 PROCEDURE — 2580000003 HC RX 258: Performed by: PHYSICIAN ASSISTANT

## 2022-02-07 PROCEDURE — 1200000000 HC SEMI PRIVATE

## 2022-02-07 PROCEDURE — 87186 SC STD MICRODIL/AGAR DIL: CPT

## 2022-02-07 PROCEDURE — 99222 1ST HOSP IP/OBS MODERATE 55: CPT | Performed by: NURSE PRACTITIONER

## 2022-02-07 PROCEDURE — 83735 ASSAY OF MAGNESIUM: CPT

## 2022-02-07 RX ADMIN — Medication 5 ML: at 09:13

## 2022-02-07 RX ADMIN — BISACODYL 5 MG: 5 TABLET ORAL at 09:13

## 2022-02-07 RX ADMIN — METOPROLOL TARTRATE 37.5 MG: 25 TABLET, FILM COATED ORAL at 09:13

## 2022-02-07 RX ADMIN — POLYETHYLENE GLYCOL 3350 17 G: 17 POWDER, FOR SOLUTION ORAL at 09:12

## 2022-02-07 RX ADMIN — DIGOXIN 125 MCG: 125 TABLET ORAL at 09:13

## 2022-02-07 RX ADMIN — SENNOSIDES AND DOCUSATE SODIUM 1 TABLET: 50; 8.6 TABLET ORAL at 21:05

## 2022-02-07 RX ADMIN — SODIUM CHLORIDE: 9 INJECTION, SOLUTION INTRAVENOUS at 11:49

## 2022-02-07 RX ADMIN — PANTOPRAZOLE SODIUM 40 MG: 40 TABLET, DELAYED RELEASE ORAL at 05:28

## 2022-02-07 RX ADMIN — CLOPIDOGREL BISULFATE 75 MG: 75 TABLET ORAL at 09:13

## 2022-02-07 RX ADMIN — OXYCODONE 10 MG: 5 TABLET ORAL at 12:12

## 2022-02-07 RX ADMIN — FUROSEMIDE 20 MG: 20 TABLET ORAL at 09:13

## 2022-02-07 RX ADMIN — OXYCODONE 10 MG: 5 TABLET ORAL at 21:05

## 2022-02-07 RX ADMIN — OXYCODONE 5 MG: 5 TABLET ORAL at 01:40

## 2022-02-07 RX ADMIN — ATORVASTATIN CALCIUM 10 MG: 10 TABLET, FILM COATED ORAL at 21:05

## 2022-02-07 RX ADMIN — Medication 5 ML: at 21:08

## 2022-02-07 RX ADMIN — CEFTRIAXONE SODIUM 1000 MG: 1 INJECTION, POWDER, FOR SOLUTION INTRAMUSCULAR; INTRAVENOUS at 20:17

## 2022-02-07 RX ADMIN — SODIUM CHLORIDE: 9 INJECTION, SOLUTION INTRAVENOUS at 21:11

## 2022-02-07 RX ADMIN — METOPROLOL TARTRATE 37.5 MG: 25 TABLET, FILM COATED ORAL at 21:07

## 2022-02-07 RX ADMIN — DIAZEPAM 5 MG: 5 TABLET ORAL at 21:06

## 2022-02-07 RX ADMIN — SENNOSIDES AND DOCUSATE SODIUM 1 TABLET: 50; 8.6 TABLET ORAL at 09:13

## 2022-02-07 RX ADMIN — SODIUM CHLORIDE: 9 INJECTION, SOLUTION INTRAVENOUS at 01:40

## 2022-02-07 RX ADMIN — DIAZEPAM 5 MG: 5 TABLET ORAL at 11:26

## 2022-02-07 RX ADMIN — NALOXEGOL OXALATE 12.5 MG: 12.5 TABLET, FILM COATED ORAL at 09:13

## 2022-02-07 RX ADMIN — Medication 250 MG: at 09:13

## 2022-02-07 RX ADMIN — Medication 5 ML: at 01:41

## 2022-02-07 ASSESSMENT — PAIN DESCRIPTION - FREQUENCY
FREQUENCY: INTERMITTENT
FREQUENCY: CONTINUOUS
FREQUENCY: CONTINUOUS

## 2022-02-07 ASSESSMENT — PAIN DESCRIPTION - PROGRESSION
CLINICAL_PROGRESSION: NOT CHANGED
CLINICAL_PROGRESSION: GRADUALLY IMPROVING
CLINICAL_PROGRESSION: GRADUALLY WORSENING
CLINICAL_PROGRESSION: GRADUALLY IMPROVING

## 2022-02-07 ASSESSMENT — PAIN - FUNCTIONAL ASSESSMENT
PAIN_FUNCTIONAL_ASSESSMENT: ACTIVITIES ARE NOT PREVENTED

## 2022-02-07 ASSESSMENT — PAIN DESCRIPTION - DESCRIPTORS
DESCRIPTORS: HEADACHE
DESCRIPTORS: ACHING;DULL;HEADACHE
DESCRIPTORS: SORE

## 2022-02-07 ASSESSMENT — PAIN SCALES - GENERAL
PAINLEVEL_OUTOF10: 8
PAINLEVEL_OUTOF10: 1
PAINLEVEL_OUTOF10: 7
PAINLEVEL_OUTOF10: 0
PAINLEVEL_OUTOF10: 4
PAINLEVEL_OUTOF10: 2
PAINLEVEL_OUTOF10: 8

## 2022-02-07 ASSESSMENT — PAIN DESCRIPTION - ONSET
ONSET: ON-GOING
ONSET: GRADUAL
ONSET: ON-GOING

## 2022-02-07 ASSESSMENT — PAIN DESCRIPTION - ORIENTATION
ORIENTATION: LOWER;MID
ORIENTATION: LOWER;MID

## 2022-02-07 ASSESSMENT — PAIN DESCRIPTION - LOCATION
LOCATION: BACK
LOCATION: BACK
LOCATION: HEAD
LOCATION: HEAD

## 2022-02-07 ASSESSMENT — PAIN DESCRIPTION - PAIN TYPE
TYPE: ACUTE PAIN
TYPE: ACUTE PAIN
TYPE: ACUTE PAIN;SURGICAL PAIN
TYPE: ACUTE PAIN

## 2022-02-07 NOTE — PROGRESS NOTES
Department of Orthopedic Surgery  Spine Service  Progress Note        Subjective:    2/1/22  Naina Camejo is resting in the chair. Doing better with pain medication. Cardio consulted, planning stress test today. Holding Plavix with planned surgery Friday possibly pending cardio recs. 2/2/22  Gail is resting in bed. Doing well. Stress test completed yesterday. Await full cardio recommendation, showed no new ischemia. Likely to proceed with surgery Friday. 2/3/22  Gail is resting in the chair. Surgically cleared by Cardio. Planning surgical intervention tomorrow afternoon. NPO at midnight. 2/4/22 Surgery    2/5/22 POD 1  Gail is resting in bed on bedrest. N with small amounts of vomiting. Believes caused by Percocet. Will d/c and add Kerry panel since patient tolerated in past. Muscle spasms uncontrolled, d/c flexeril, add Valium. Denies HA. Drain output dark red. Relief of LLE radicular symptoms. Will slowly advance activity level with HOB at 30 degrees with HA precautions. 2/6/22 POD 2  Gail is resting in bed with HOB at 30 degrees. No HA. One drain with increased output of 240 and other at 0. Light red color. Pain better controlled with Kerry and Valium. N/V relieved. Continued relief of LLE radicular symptoms. Will restart plavix today. Continue bedrest, will dangle feet at bedside today with HA precautions    2/7/22 POD 3  Gail is resting in bed. Developed N yesterday with poor appetite, better this morning. Poor nutritional intake, will consult nutritionist. Dangled feet at bedside, denies HA. Drain output continues to be elevated with one having light red with approximately 360. Patient would like to sit in chair today. Ok to discontinue bedrest and sit patient in chair with HA precautions.  Elevated BP, IM reconsulted, added IV hydralazine, if not improved will have Cardio for re-eval.     Vitals  VITALS:  BP (!) 161/74   Pulse 76   Temp 98.4 °F (36.9 °C) (Oral)   Resp 18   Ht 5' 1\" (1.549 m)   Wt 115 lb (52.2 kg)   SpO2 98%   BMI 21.73 kg/m²   24HR INTAKE/OUTPUT:      Intake/Output Summary (Last 24 hours) at 2/7/2022 3960  Last data filed at 2/7/2022 0530  Gross per 24 hour   Intake 3174.95 ml   Output 1570 ml   Net 1604.95 ml     URINARY CATHETER OUTPUT (Garcia):     DRAIN/TUBE OUTPUT:  Closed/Suction Drain Left Back Accordion 10 Algerian-Output (ml): 0 ml  Closed/Suction Drain Right Back Accordion 10 Algerian-Output (ml): 130 ml      PHYSICAL EXAM:    Orientation:  alert and oriented to person, place and time    Lower Extremity Motor :  quadriceps, extensor hallucis longus, dorsiflexion, plantarflexion 5/5 bilaterally  Lower Extremity Sensory:  Intact L1-S1    Flatus:  positive    ABNORMAL EXAM FINDINGS:  none    LABS:    HgB:    Lab Results   Component Value Date    HGB 10.2 02/07/2022         ASSESSMENT AND PLAN:    POD 3, L1-L4 lami with durotomy leak and repair  Lumbar spinal stenosis  Lumbar radiculopathy  Low back pain     1:  Monitor labs and drain output; 50%  2:  Cardio cleared, appreciate recs  3:  Discontinue dedrest; Up to chair today only, HA precautions. If HA or increase of drain output, place back on bedrest.   4:  Pain better controlled, continue Kerry and Valium   5: Discharge pending clinical course. Await advancement of activity.     6: Nausea, resolved  7: Elevated BP, IM following, added IV hydralazine prn  8: Restarted Plavix 2/6/22

## 2022-02-07 NOTE — CARE COORDINATION
2/7/22, 1:47 PM EST    DISCHARGE ON GOING EVALUATION    [address] day: 8  [id number]  Procedure: POD 3, L1, L2, L3 and L4 laminectomies and a left side L2-L3  lumbar disk excision. Barriers to Discharge: PT/OT recommends IP rehab, dressing care, pain control, up to chair 1st time today. Monitor drain outputs 0 mL and 130 mL. Patient Goals/Plan/Treatment Preferences: from home alone; agreeable to IP rehab.

## 2022-02-07 NOTE — PROGRESS NOTES
 digoxin  125 mcg Oral Daily    furosemide  20 mg Oral Daily    magnesium oxide  250 mg Oral Daily    metoprolol tartrate  37.5 mg Oral BID    atorvastatin  10 mg Oral Nightly    pantoprazole  40 mg Oral QAM AC    clopidogrel  75 mg Oral QAM     PRN Meds: magic (miracle) mouthwash, hydrALAZINE, oxyCODONE **OR** oxyCODONE, diazePAM, sodium chloride flush, sodium chloride, ondansetron **OR** ondansetron, magnesium hydroxide, bisacodyl, HYDROmorphone, HYDROmorphone, nitroGLYCERIN      Intake/Output Summary (Last 24 hours) at 2/7/2022 1804  Last data filed at 2/7/2022 1743  Gross per 24 hour   Intake 3444.95 ml   Output 1230 ml   Net 2214.95 ml       Diet:  ADULT DIET; Regular  ADULT ORAL NUTRITION SUPPLEMENT; Breakfast, Lunch, Dinner; Other Oral Supplement; activia and hot beverage at B,L and D    Exam:  BP (!) 101/52   Pulse 73   Temp 98 °F (36.7 °C) (Oral)   Resp 16   Ht 5' 1\" (1.549 m)   Wt 115 lb (52.2 kg)   SpO2 94%   BMI 21.73 kg/m²   General appearance: No apparent distress, appears stated age and cooperative. HEENT: Pupils equal, round, and reactive to light. Conjunctivae/corneas clear. Neck: Supple, with full range of motion. No jugular venous distention. Trachea midline. Respiratory:  Normal respiratory effort on RA. Breath sounds diminished. Cardiovascular: Regular rate and rhythm with normal S1/S2 without murmurs, rubs or gallops. Abdomen: Soft, non-tender, non-distended with normal bowel sounds. Musculoskeletal: passive and active ROM x 4 extremities. Hemovac drains in place. Dressing c/d/i   Skin: Skin color, texture, turgor normal.  No rashes or lesions. Neurologic:  Neurovascularly intact without any focal sensory/motor deficits.  Cranial nerves: II-XII intact, grossly non-focal.  Psychiatric: Alert and oriented, thought content appropriate, normal insight  Capillary Refill: Brisk,< 3 seconds   Peripheral Pulses: +2 palpable, equal bilaterally     Labs:   Recent Labs 02/05/22  0451 02/06/22  0431 02/07/22 0452   WBC 12.8* 9.3 10.8   HGB 10.5* 9.7* 10.2*   HCT 32.4* 32.1* 32.7*    192 208     Recent Labs     02/07/22 0452      K 3.5      CO2 24   BUN 8   CREATININE 0.3*   CALCIUM 8.0*     Urinalysis:      Lab Results   Component Value Date    NITRU POSITIVE 02/07/2022    BLOODU LARGE 02/07/2022    GLUCOSEU NEGATIVE 02/07/2022       Radiology:  XR LUMBAR SPINE 1 VW   Final Result   1. There is a surgical history directed towards posterior elements of L3. Additional degenerative changes are as described above. Please refer to operative report for further details. **This report has been created using voice recognition software. It may contain minor errors which are inherent in voice recognition technology. **      Final report electronically signed by Dr. Chuckie Grier on 2/4/2022 9:07 AM      XR CHEST PORTABLE   Final Result   1. Nonspecific hazy opacities in the right lung base. Findings can relate to atelectasis or infiltrate. **This report has been created using voice recognition software. It may contain minor errors which are inherent in voice recognition technology. **      Final report electronically signed by Dr Satish Rollins on 1/30/2022 5:54 PM      NM MYOCARDIAL SPECT REST EXERCISE OR RX    (Results Pending)     Electronically signed by ROXANA Sampson on 2/7/2022 at 6:04 PM

## 2022-02-07 NOTE — PLAN OF CARE
Problem: Falls - Risk of:  Goal: Will remain free from falls  Description: Will remain free from falls  Outcome: Ongoing  Note: Patient has remained free of falls during this shift. Appropriate fall prevention measures in place. Patient is compliant with using call light for assistance when needed. Problem: Pain:  Goal: Pain level will decrease  Description: Pain level will decrease  Outcome: Ongoing  Note: Patient has denied pain during this shift. Patient's stated pain goal is 2. Will continue to monitor and administer pain medication as appropriate. Problem: Musculor/Skeletal Functional Status  Goal: Highest potential functional level  Outcome: Ongoing  Note: Patient is up with stand by assist and a walker. Gait is steady. Problem: Skin Integrity:  Goal: Absence of new skin breakdown  Description: Absence of new skin breakdown  Outcome: Ongoing  Note: No new skin issues noted during this shift. See skin assessment. Patient is able to reposition independently. Problem: Neurological  Goal: Maximum potential motor/sensory/cognitive function  Outcome: Ongoing  Note: Patient alert and oriented x 4. Denies any numbness or tingling in the extremities. Problem: Cardiovascular  Goal: No DVT, peripheral vascular complications  Outcome: Ongoing  Note: No s/sx of DVT during this shift. Patient is on lovenox for prevention. Problem: GI  Goal: No bowel complications  Outcome: Ongoing  Note: Bowel sounds active x 4. Patient is passing gas. No BM during this shift. Problem:   Goal: Adequate urinary output  Outcome: Ongoing  Note: Patient has voided adequate amounts of urine that is occasionally blood tinged. Problem: Daily Care:  Goal: Daily care needs are met  Description: Daily care needs are met  Outcome: Ongoing  Note: Patient requires minimal assistance with daily care needs.       Problem: Discharge Planning:  Goal: Patients continuum of care needs are met  Description: Patients continuum of care needs are met  Outcome: Ongoing  Note: Discharge planning in progress. Patient is planning to return home at discharge. Case management assisting with discharge needs. Care plan reviewed with patient. Patient verbalize understanding of the plan of care and contribute to goal setting.

## 2022-02-07 NOTE — PROGRESS NOTES
[facility]  Occupational Therapy  Re- Evaluation Note  Time:   Time In: 820  Time Out: 5686  Timed Code Treatment Minutes: 15 Minutes  Minutes: 23          Date: 2022  Patient Name: Luisito Rasheed,   Gender: female      Room: [facility]-A  MRN: [id number]  : 1945  (77 y.o.)  Referring Practitioner: JEROME Herzog PA-C  Diagnosis: spinal stenosis  Additional Pertinent Hx: Per EMR \"Gail is a 75y/o female known to our office having been seen in the past several months with complaints of low back pain and leg pain. She has been through multiple epidural injections, most recently last month, with some mild-moderate relief of her pain. Over the past several days her pain had been worsening significantly to the point it was completely uncontrolled and her mobility had declined significantly. She called our office and the only recommendation was for her to present to the ED for evaluation and admission for further care. She does have a history of previous lumbar spine surgery roughly 20yrs ago. History of pacemaker, afib and is on plavix as well. She describes a sharp pain traveling from the low back into the left lateral/anterior thigh are. No new changes in bowel/bladder continence. \" L1-L4 Laminectomy with complication (durotomy) on     Restrictions/Precautions:  Restrictions/Precautions: General Precautions,Fall Risk,Surgical Protocols  Required Braces or Orthoses  Other: Abdominal Binder  Position Activity Restriction  Spinal Precautions: No Bending,No Lifting,No Twisting  Other position/activity restrictions: L1-L4 Laminectomy with complication (durotomy) on      SUBJECTIVE: RN approved of OT session. Pt supine in bed on arrival and agreeable to therapy. Pt denies headache, dizziness, or nausea throughout session. *pt able to identify 2/3 back precautions at beginning of session without requiring verbal cues.    OT educated pt to notify nurse right away via the call light if experiencing a headache or dizziness while sitting in recliner. PAIN: Lafonda Marbury but not painful\" referring to lower back     Vitals: Vitals not assessed per clinical judgement, see nursing flowsheet    COGNITION: Decreased Insight, Decreased Problem Solving and Decreased Safety Awareness    ADL:   Lower Extremity Dressing: Dependent. don bilateral socks - pt urgently need to void, therefore therpaist completed LB dressing for pt to quickly don -sock to safely ambualte to [facility]  Toileting: Contact Guard Assistance. for pericare and minimal assistance for clothing management   Toilet Transfer: Minimal Assistance. to [facility] - pt prematuraly and quickly began to sit on [facility] d/t urgency with needing to void  requiring min A for correct alignment. *pt had blood in urine - RN notified     BALANCE:  Sitting Balance:  Modified Independent. EOB   Standing Balance: Contact Guard Assistance. with 1-2 UE release for ~ 1 minute during ADL     BED MOBILITY:  Rolling to Left: Contact Guard Assistance    Supine to Sit: Stand By Assistance      TRANSFERS:  Sit to Stand:  Contact Guard Assistance. From EOB   Stand to Sit: Contact Guard Assistance. To recliner     FUNCTIONAL MOBILITY:  Assistive Device: Rolling Walker  Assist Level:  Contact Guard Assistance and Minimal Assistance. Distance: EOB > BSC; BSC > recliner   Minimal Assistance from EOB > BSC d/t pt performing quickly and unsafely with urgency to void. CGA from [facility] > recliner, with performing safer and slower than first attempt at ambulating. *pt with c/o weaker LLE making it harder to ambulate     ADDITIONAL ACTIVITIES:  Pt had questions regarding ADLs and movements while maintaining spinal precautions. OT provided education and answered all of pt's questions. ASSESSMENT:     Activity Tolerance:  Patient tolerance of  treatment: good.        Discharge Recommendations: Patient would benefit from continued OT at discharge, Inpatient Therapy Stay and Recommend Physiatry Consult  Equipment Recommendations:    Plan: Times per week: 6x  Times per day: Daily  Current Treatment Recommendations: Strengthening,Endurance Training,Self-Care / ADL,Patient/Caregiver Education & Johanny Sham Management,Home Management Training,Functional Mobility Training,Safety Education & Training    Patient Education  Patient Education: Role of OT, Plan of Care, ADL's, Precautions and Importance of Increasing Activity    Goals  Short term goals  Time Frame for Short term goals: by discharge pt will  Short term goal 1: complete ADL routine with S and no cues for back safety, AE PRN  Short term goal 2: tolerate standing  x 5 min with S to increase endurance for sinkside ADL routine  Short term goal 3: ambulate to/ from the BR as well as around obstacles with S and AE PRN to increase endurance / tolerance for home mobility  Short term goal 4: complete simulated homemaking tasks with SBA and no cues for back safety  Long term goals  Time Frame for Long term goals : not est due to est short LOS      Revised Short-Term Goals  Short term goals  Time Frame for Short term goals: by discharge  Short term goal 1: pt will complete BADL routine with S and using AE prn, while requiring 0 vc's to maitnain back precautions to increase indep with self care tasks  Short term goal 2: pt will tolerate dynamic standing  x 5 min with S to increase endurance for sinkside ADL routine  Short term goal 3: pt will complete functional mobility to/from bathroom with S using AD to access ADLs  Short term goal 4: pt will complete simulated homemaking tasks with SBA and no cues for back safety  Long term goals  Time Frame for Long term goals : not est due to est short LOS      Following session, patient left in safe position with all fall risk precautions in place.
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
## 2022-02-07 NOTE — CARE COORDINATION
2/7/22, 7:56 AM EST    DISCHARGE ON GOING EVALUATION    [address] day: 8  Location: -07/007-A Reason for admit: Spinal stenosis [M48.00]  Spinal stenosis, unspecified spinal region [M48.00]  Midline low back pain without sciatica, unspecified chronicity [M54.50]   Procedure:   2/4  L1-L4 Laminectomy with complication (durotomy) on 2/4.  ml  Barriers to Discharge: N/V checks, monitor drain outputs 0 mL and 130 mL, IV Hydralazine prn, dietitian consulted,        PCP: Cele Mansfield MD  Readmission Risk Score: 14.2 ( )%  Patient Goals/Plan/Treatment Preferences: follow for possible IPR.

## 2022-02-07 NOTE — PROGRESS NOTES
Comments: Pt IND prior, family in the area to help as needed. Pt was using cane about 1-2 weeks prior to admission due to pain    Restrictions/Precautions:  Restrictions/Precautions: General Precautions,Fall Risk,Surgical Protocols  Required Braces or Orthoses  Other: Abdominal Binder  Position Activity Restriction  Spinal Precautions: No Bending,No Lifting,No Twisting  Other position/activity restrictions: L1-L4 Laminectomy with complication (durotomy) on 2/4, monitor for HA, return to supine if HA     SUBJECTIVE: Ok to see pt per nursing with nursing in room. Pt agreeable to PT session, in bedside chair, requesting to use the restroom. PAIN: denies pain, reports soreness in back    Vitals: Nurse checked vitals prior to session    OBJECTIVE:  Bed Mobility:  Not Tested    Transfers:  Sit to Stand: Contact Guard Assistance, X 1, with increased time for completion, cues for hand placement, with verbal cues  Stand to Sit:Contact Guard Assistance, Minimal Assistance, X 1, cues for hand placement, with verbal cues  RW for support, cues for safety, cues for feeling the surfaces prior to sitting with fair demo  Ambulation:  Contact Guard Assistance, Minimal Assistance, X 1, with cues for safety, with verbal cues , with increased time for completion  Distance: 15 feet x2, 12 feet x1  Surface: Level Tile  Device:Rolling Walker  Gait Deviations:   Forward Flexed Posture, Slow Tianna, Decreased Step Length Bilaterally, Decreased Weight Shift Left, Decreased Gait Speed, Decreased Heel Strike Bilaterally, Unsteady Gait and Decreased Terminal Knee Extension  Cues for safety, no LOB noted, buckling noted for L knee due to weakness  Balance:  Static Sitting Balance:  Independent sitting in toilet  Dynamic Sitting Balance: Supervision to complete sarahi care  Static Standing Balance: Contact Guard Assistance, X 1, with cues for safety, with verbal cues, RW for support  Dynamic Standing Balance: Contact Guard Assistance, X 1, with

## 2022-02-08 PROBLEM — E44.0 MODERATE MALNUTRITION (HCC): Status: ACTIVE | Noted: 2022-02-08

## 2022-02-08 PROCEDURE — 2580000003 HC RX 258: Performed by: PHYSICIAN ASSISTANT

## 2022-02-08 PROCEDURE — 6370000000 HC RX 637 (ALT 250 FOR IP): Performed by: PHYSICIAN ASSISTANT

## 2022-02-08 PROCEDURE — 99232 SBSQ HOSP IP/OBS MODERATE 35: CPT | Performed by: PHYSICIAN ASSISTANT

## 2022-02-08 PROCEDURE — 1200000000 HC SEMI PRIVATE

## 2022-02-08 PROCEDURE — 99231 SBSQ HOSP IP/OBS SF/LOW 25: CPT | Performed by: NURSE PRACTITIONER

## 2022-02-08 PROCEDURE — 6360000002 HC RX W HCPCS: Performed by: PHYSICIAN ASSISTANT

## 2022-02-08 PROCEDURE — 97535 SELF CARE MNGMENT TRAINING: CPT

## 2022-02-08 RX ADMIN — ATORVASTATIN CALCIUM 10 MG: 10 TABLET, FILM COATED ORAL at 20:17

## 2022-02-08 RX ADMIN — POLYETHYLENE GLYCOL 3350 17 G: 17 POWDER, FOR SOLUTION ORAL at 08:33

## 2022-02-08 RX ADMIN — FUROSEMIDE 20 MG: 20 TABLET ORAL at 08:32

## 2022-02-08 RX ADMIN — Medication 250 MG: at 08:32

## 2022-02-08 RX ADMIN — DIAZEPAM 5 MG: 5 TABLET ORAL at 20:17

## 2022-02-08 RX ADMIN — OXYCODONE 5 MG: 5 TABLET ORAL at 07:08

## 2022-02-08 RX ADMIN — NALOXEGOL OXALATE 12.5 MG: 12.5 TABLET, FILM COATED ORAL at 08:32

## 2022-02-08 RX ADMIN — BISACODYL 5 MG: 5 TABLET ORAL at 08:32

## 2022-02-08 RX ADMIN — SENNOSIDES AND DOCUSATE SODIUM 1 TABLET: 50; 8.6 TABLET ORAL at 20:17

## 2022-02-08 RX ADMIN — SODIUM CHLORIDE: 9 INJECTION, SOLUTION INTRAVENOUS at 06:17

## 2022-02-08 RX ADMIN — METOPROLOL TARTRATE 37.5 MG: 25 TABLET, FILM COATED ORAL at 20:17

## 2022-02-08 RX ADMIN — PANTOPRAZOLE SODIUM 40 MG: 40 TABLET, DELAYED RELEASE ORAL at 06:15

## 2022-02-08 RX ADMIN — Medication 5 ML: at 20:19

## 2022-02-08 RX ADMIN — OXYCODONE 10 MG: 5 TABLET ORAL at 17:57

## 2022-02-08 RX ADMIN — SODIUM CHLORIDE: 9 INJECTION, SOLUTION INTRAVENOUS at 20:24

## 2022-02-08 RX ADMIN — DIGOXIN 125 MCG: 125 TABLET ORAL at 08:36

## 2022-02-08 RX ADMIN — SENNOSIDES AND DOCUSATE SODIUM 1 TABLET: 50; 8.6 TABLET ORAL at 08:32

## 2022-02-08 RX ADMIN — CLOPIDOGREL BISULFATE 75 MG: 75 TABLET ORAL at 08:32

## 2022-02-08 RX ADMIN — CEFTRIAXONE SODIUM 1000 MG: 1 INJECTION, POWDER, FOR SOLUTION INTRAMUSCULAR; INTRAVENOUS at 18:10

## 2022-02-08 ASSESSMENT — PAIN SCALES - GENERAL
PAINLEVEL_OUTOF10: 8
PAINLEVEL_OUTOF10: 4
PAINLEVEL_OUTOF10: 0

## 2022-02-08 NOTE — PROGRESS NOTES
erythema. Lumbar incision not visualized. Two drains noted from below dressing. Peripheral vascular: Pulses: Normal upper and lower extremity pulses; Edema: trace      Diagnostics:   Recent Results (from the past 24 hour(s))   Urine with Reflexed Micro    Collection Time: 02/07/22  4:52 PM   Result Value Ref Range    Glucose, Ur NEGATIVE NEGATIVE mg/dl    Bilirubin Urine SMALL (A) NEGATIVE    Ketones, Urine NEGATIVE NEGATIVE    Specific Gravity, Urine 1.011 1.002 - 1.030    Blood, Urine LARGE (A) NEGATIVE    pH, UA 6.5 5.0 - 9.0    Protein,  (A) NEGATIVE    Urobilinogen, Urine 1.0 0.0 - 1.0 eu/dl    Nitrite, Urine POSITIVE (A) NEGATIVE    Leukocyte Esterase, Urine LARGE (A) NEGATIVE    Color, UA RED (A) STRAW-YELLOW    Character, Urine CLOUDY (A) CLEAR-SL CLOUD    RBC, UA > 200 0-2/hpf /hpf    WBC, UA >200W/CLUMPS 0-4/hpf /hpf    Epithelial Cells, UA 0-2 3-5/hpf /hpf    Bacteria, UA NONE SEEN FEW/NONE SEEN /hpf    Casts UA NONE SEEN NONE SEEN /lpf    Crystals, UA NONE SEEN NONE SEEN    Renal Epithelial, UA NONE SEEN NONE SEEN    Yeast, UA NONE SEEN NONE SEEN    CASTS 2 NONE SEEN NONE SEEN /lpf    MISCELLANEOUS 2 NONE SEEN    Bile Acids, Total    Collection Time: 02/07/22  4:52 PM   Result Value Ref Range    Ictotest NEGATIVE NEGATIVE       Impression:  · Lumbar stenosis  ? L1-L4 laminectomies and a left side L2-L3 lumbar disk excision, CSF leak. On 2/4/22 with Dr Chambers Sin  · Lumbar pain with radiculopathy - improved post op  · Left leg weakness  · Atrial septal defect   · Sick sinus syndrome with pacemaker  · PAF  · CAD with hx stent  · HFpEF  · Rheumatoid arthritis  · COPD  · HTN  · HLD  · GERD  ·     Plan:  · Continue current therapies   · Monitor therapy progress and drain output. Headache this AM and back on bedrest.  Feel will be appropriate for IPR once medically stable and cleared to complete 3 hours of therapy a day. IPR bed possibly available later this week.    · Will follow       Missed Therapy Time:  · None    Alia Spring, APRN - CNP

## 2022-02-08 NOTE — PROGRESS NOTES
Hospitalist Progress Note      Patient:  [facility]    Unit/Bed:7-07/007-A  YOB: 1945  MRN: [id number]   Acct: [de-identified]   PCP: Janice Lizama MD  Date of Admission: 1/30/2022    Assessment/Plan:    1. Acute Cystitis: Patient had blood-tinged urine with sediment today. UA very suspicious for urinary tract infection with positive nitrates, leukocyte esterase. Urine culture growing Proteus & nonfermenting gram negative bacilli. Continue IV ceftriaxone. Will follow cultures. 2. Accelerated HTN: Resolved. Blood pressure more controlled today. Patient has only received 1 dose of IV hydralazine. Continue to monitor. 3. L1-L4 laminectomy with durotomy leak & repair: Ortho primary. POD 4. Physical therapy. Occupational Therapy. PMR consulted. Bedrest for now. 4. CAD s/p PCI, PPM, WATCHMAN: Cardiology consulted prior to operation. Appreciate recommendations. 5. RA: Continue home medications: Rheumatrex    Chief Complaint: Spinal stenosis    Subjective (past 24 hours):   No acute events overnight. Pt having HA and now back on bedrest. Holding therapies for now. No issues or concerns. Past medical history, family history, social history and allergies reviewed again and is unchanged since admission. ROS (All review of systems completed. Pertinent positives noted.  Otherwise All other systems reviewed and negative.)     Medications:  Reviewed    Infusion Medications    sodium chloride 100 mL/hr at 02/08/22 0740    sodium chloride       Scheduled Medications    cefTRIAXone (ROCEPHIN) IV  1,000 mg IntraVENous Q24H    sodium chloride flush  5-40 mL IntraVENous 2 times per day    polyethylene glycol  17 g Oral Daily    bisacodyl  5 mg Oral Daily    sennosides-docusate sodium  1 tablet Oral BID    naloxegol  12.5 mg Oral QAM    methotrexate  22.5 mg Oral Weekly    digoxin  125 mcg Oral Daily    furosemide  20 mg Oral Daily    magnesium oxide  250 mg Oral Daily    metoprolol tartrate  37.5 mg Oral BID    atorvastatin  10 mg Oral Nightly    pantoprazole  40 mg Oral QAM AC    clopidogrel  75 mg Oral QAM     PRN Meds: magic (miracle) mouthwash, hydrALAZINE, oxyCODONE **OR** oxyCODONE, diazePAM, sodium chloride flush, sodium chloride, ondansetron **OR** ondansetron, magnesium hydroxide, bisacodyl, HYDROmorphone, HYDROmorphone, nitroGLYCERIN      Intake/Output Summary (Last 24 hours) at 2/8/2022 1549  Last data filed at 2/8/2022 1536  Gross per 24 hour   Intake 3201.64 ml   Output 1341 ml   Net 1860.64 ml       Diet:  ADULT DIET; Regular  ADULT ORAL NUTRITION SUPPLEMENT; Breakfast, Lunch, Dinner; Other Oral Supplement; activia and hot beverage at B,L and D    Exam:  /62   Pulse 79   Temp 98 °F (36.7 °C) (Oral)   Resp 18   Ht 5' 1\" (1.549 m)   Wt 115 lb (52.2 kg)   SpO2 97%   BMI 21.73 kg/m²   General appearance: No apparent distress, appears stated age and cooperative. HEENT: Pupils equal, round, and reactive to light. Conjunctivae/corneas clear. Neck: Supple, with full range of motion. No jugular venous distention. Trachea midline. Respiratory:  Normal respiratory effort on RA. Breath sounds diminished. Cardiovascular: Regular rate and rhythm with normal S1/S2 without murmurs, rubs or gallops. Abdomen: Soft, non-tender, non-distended with normal bowel sounds. Musculoskeletal: passive and active ROM x 4 extremities. Hemovac drains in place. Dressing c/d/i   Skin: Skin color, texture, turgor normal.  No rashes or lesions. Neurologic:  Neurovascularly intact without any focal sensory/motor deficits.  Cranial nerves: II-XII intact, grossly non-focal.  Psychiatric: Alert and oriented, thought content appropriate, normal insight  Capillary Refill: Brisk,< 3 seconds   Peripheral Pulses: +2 palpable, equal bilaterally     Labs:   Recent Labs     02/06/22  0431 02/07/22  0452   WBC 9.3 10.8   HGB 9.7* 10.2*   HCT 32.1* 32.7*    208     Recent Labs     02/07/22  0452      K 3.5      CO2 24   BUN 8   CREATININE 0.3*   CALCIUM 8.0*     Urinalysis:      Lab Results   Component Value Date    NITRU POSITIVE 02/07/2022    WBCUA >200W/CLUMPS 02/07/2022    BACTERIA NONE SEEN 02/07/2022    RBCUA > 200 02/07/2022    BLOODU LARGE 02/07/2022    GLUCOSEU NEGATIVE 02/07/2022       Radiology:  XR LUMBAR SPINE 1 VW   Final Result   1. There is a surgical history directed towards posterior elements of L3. Additional degenerative changes are as described above. Please refer to operative report for further details. **This report has been created using voice recognition software. It may contain minor errors which are inherent in voice recognition technology. **      Final report electronically signed by Dr. Kadeem Orlando on 2/4/2022 9:07 AM      XR CHEST PORTABLE   Final Result   1. Nonspecific hazy opacities in the right lung base. Findings can relate to atelectasis or infiltrate. **This report has been created using voice recognition software. It may contain minor errors which are inherent in voice recognition technology. **      Final report electronically signed by Dr Rudy Tomlin on 1/30/2022 5:54 PM      NM MYOCARDIAL SPECT REST EXERCISE OR RX    (Results Pending)     Electronically signed by ROXANA Jorgensen on 2/8/2022 at 3:49 PM

## 2022-02-08 NOTE — PROGRESS NOTES
Department of Orthopedic Surgery  Spine Service  Progress Note        Subjective:    2/1/22  Nadya Marsh is resting in the chair. Doing better with pain medication. Cardio consulted, planning stress test today. Holding Plavix with planned surgery Friday possibly pending cardio recs. 2/2/22  Gail is resting in bed. Doing well. Stress test completed yesterday. Await full cardio recommendation, showed no new ischemia. Likely to proceed with surgery Friday. 2/3/22  Gail is resting in the chair. Surgically cleared by Cardio. Planning surgical intervention tomorrow afternoon. NPO at midnight. 2/4/22 Surgery    2/5/22 POD 1  Gail is resting in bed on bedrest. N with small amounts of vomiting. Believes caused by Percocet. Will d/c and add Kerry panel since patient tolerated in past. Muscle spasms uncontrolled, d/c flexeril, add Valium. Denies HA. Drain output dark red. Relief of LLE radicular symptoms. Will slowly advance activity level with HOB at 30 degrees with HA precautions. 2/6/22 POD 2  Gail is resting in bed with HOB at 30 degrees. No HA. One drain with increased output of 240 and other at 0. Light red color. Pain better controlled with Kerry and Valium. N/V relieved. Continued relief of LLE radicular symptoms. Will restart plavix today. Continue bedrest, will dangle feet at bedside today with HA precautions    2/7/22 POD 3  Gail is resting in bed. Developed N yesterday with poor appetite, better this morning. Poor nutritional intake, will consult nutritionist. Dangled feet at bedside, denies HA. Drain output continues to be elevated with one having light red with approximately 360. Patient would like to sit in chair today. Ok to discontinue bedrest and sit patient in chair with HA precautions. Elevated BP, IM reconsulted, added IV hydralazine, if not improved will have Cardio for re-eval.     2/8/22 POD 4  Gail is resting in bed.  Developed a posterior HA last night when getting up from the chair which was relieved laying flat. Will return back to bedrest, hold PT. HOB no more than 30 degrees. IP Rehab consulted and patient is candidate when medically cleared and able to undergo 3 hours of PT/daily. +UTI, hematuria. On Ancef per IM    Vitals  VITALS:  /65   Pulse 70   Temp 98.1 °F (36.7 °C) (Oral)   Resp 16   Ht 5' 1\" (1.549 m)   Wt 115 lb (52.2 kg)   SpO2 96%   BMI 21.73 kg/m²   24HR INTAKE/OUTPUT:      Intake/Output Summary (Last 24 hours) at 2/8/2022 7654  Last data filed at 2/8/2022 0334  Gross per 24 hour   Intake 920 ml   Output 1301 ml   Net -381 ml     URINARY CATHETER OUTPUT (Garcia):     DRAIN/TUBE OUTPUT:  Closed/Suction Drain Left Back Accordion 10 [place]ian-Output (ml): 110 ml  Closed/Suction Drain Right Back Accordion 10 [place]ian-Output (ml): 0 ml      PHYSICAL EXAM:    Orientation:  alert and oriented to person, place and time    Lower Extremity Motor :  quadriceps, extensor hallucis longus, dorsiflexion, plantarflexion 5/5 bilaterally  Lower Extremity Sensory:  Intact L1-S1    Flatus:  positive    ABNORMAL EXAM FINDINGS:  none    LABS:    HgB:    Lab Results   Component Value Date    HGB 10.2 02/07/2022         ASSESSMENT AND PLAN:    POD 4, L1-L4 lami with durotomy leak and repair  Lumbar spinal stenosis  Lumbar radiculopathy  Low back pain     1:  Monitor labs and drain output; 50%  2:  Cardio cleared, appreciate recs  3:  Return to bedrest, HOB at 30 degrees, may dangle feet at bedside this evening if no HA.   4:  Pain better controlled, continue Kerry and Valium   5: Discharge pending clinical course. Planning IP Rehab.   6: Nausea, resolved  7: Elevated BP, IM following, added IV hydralazine prn  8: Restarted Plavix 2/6/22  9: +UTI, on Ancef, IM following.

## 2022-02-08 NOTE — PROGRESS NOTES
[facility]  Occupational Therapy  Daily Note  Time:   Time In: 9131  Time Out: 5703  Timed Code Treatment Minutes: 15 Minutes  Minutes: 15          Date: 2022  Patient Name: Mitch Phillips,   Gender: female      [facility]  MRN: [id number]  : 1945  (77 y.o.)  Referring Practitioner: JEROME Moreira PA-C  Diagnosis: spinal stenosis  Additional Pertinent Hx: Per EMR \"Gali is a 75y/o female known to our office having been seen in the past several months with complaints of low back pain and leg pain. She has been through multiple epidural injections, most recently last month, with some mild-moderate relief of her pain. Over the past several days her pain had been worsening significantly to the point it was completely uncontrolled and her mobility had declined significantly. She called our office and the only recommendation was for her to present to the ED for evaluation and admission for further care. She does have a history of previous lumbar spine surgery roughly 20yrs ago. History of pacemaker, afib and is on plavix as well. She describes a sharp pain traveling from the low back into the left lateral/anterior thigh are. No new changes in bowel/bladder continence. \" L1-L4 Laminectomy with complication (durotomy) on     Restrictions/Precautions:  Restrictions/Precautions: General Precautions,Fall Risk,Surgical Protocols  Required Braces or Orthoses  Other: Abdominal Binder  Position Activity Restriction  Spinal Precautions: No Bending,No Lifting,No Twisting  Other position/activity restrictions: L1-L4 Laminectomy with complication (durotomy) on , monitor for HA, return to supine if HA     SUBJECTIVE:Pt. On bedrest this date, SUNIL Jessica in bed OT session. Pt. Pleasant and agreeable to participate.     PAIN: minmial     Vitals: Vitals not assessed per clinical judgement, see nursing flowsheet    COGNITION: Decreased Insight    ADL:   No ADL's completed this session. .      ADDITIONAL ACTIVITIES:  Pt. Instructed on LHAE each item demo to complete LB dressing such as donning/doffing of LB clothing including shoes. Pt. Instructed on adaptive techniques in the home incorporating LHR to increase [place] and safety with ADLs. ASSESSMENT:     Activity Tolerance:  Patient tolerance of  treatment: fair. Discharge Recommendations: Inpatient Rehabilitation  Equipment Recommendations:    Plan: Times per week: 6x  Times per day: Daily  Current Treatment Recommendations: Strengthening,Endurance Training,Self-Care / ADL,Patient/Caregiver Education & [place] Management,Home Management Training,Functional Mobility Training,Safety Education & Training    Patient Education  Patient Education: ADL's and Equipment Education    Goals  Short term goals  Time Frame for Short term goals: by discharge  Short term goal 1: pt will complete BADL routine with S and using AE prn, while requiring 0 vc's to maitnain back precautions to increase indep with self care tasks  Short term goal 2: pt will tolerate dynamic standing  x 5 min with S to increase endurance for sinkside ADL routine  Short term goal 3: pt will complete functional mobility to/from bathroom with S using AD to access ADLs  Short term goal 4: pt will complete simulated homemaking tasks with SBA and no cues for back safety  Long term goals  Time Frame for Long term goals : not est due to est short LOS    Following session, patient left in safe position with all fall risk precautions in place.

## 2022-02-08 NOTE — PLAN OF CARE
Ongoing  Note: Bowel sounds active x 4. Patient is passing gas. No BM during this shift. Problem:   Goal: Adequate urinary output  2/7/2022 1519 by Ting Kaba RN  Outcome: Ongoing  Note: Patient has voided adequate amounts of urine that is occasionally blood tinged. Problem: Daily Care:  Goal: Daily care needs are met  Description: Daily care needs are met  2/7/2022 1519 by Ting Kaba RN  Outcome: Ongoing  Note: Patient requires minimal assistance with daily care needs. Problem: Discharge Planning:  Goal: Patients continuum of care needs are met  Description: Patients continuum of care needs are met  2/7/2022 1519 by Ting Kaba RN  Outcome: Ongoing  Note: Discharge planning in progress. Patient is planning to return home at discharge. Case management assisting with discharge needs. Care plan reviewed with patient. Patient verbalize understanding of the plan of care and contribute to goal setting.

## 2022-02-08 NOTE — PROGRESS NOTES
[facility]  PHYSICAL THERAPY MISSED TREATMENT NOTE  [facility]    Date: 2022  Patient Name: Jacklyn Torres        MRN: [id number]   : 1945  (77 y.o.)  Gender: female   Referring Practitioner: Kathi Pretty PA-C  Diagnosis: spinal stenosis         REASON FOR MISSED TREATMENT:  Hold treatment per nursing request.  Per ortho note and nursing, pt on bedrest this date as pt developed a HA last night, hold today, will check back tomorrow.     Umesh Kunz PT, DPT

## 2022-02-08 NOTE — PROGRESS NOTES
Rounded on the patient, spoke with nursing staff, patient is currently on bedrest, will continue to follow clinical course. Spoke with Evan Burton regarding this patient progress as well. Will continue to follow.

## 2022-02-08 NOTE — PROGRESS NOTES
Patient complained of tension in back of neck that started when up to bathroom. Returned patient to bed and laid flat.
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
## 2022-02-08 NOTE — PROGRESS NOTES
Spoke with Ho Quevedo in the lab about urine culture. Ho Quevedo stated that they can use previous urine for culture. Order released.

## 2022-02-08 NOTE — CARE COORDINATION
2/8/22, 1:40 PM EST    DISCHARGE ON GOING EVALUATION    [address] day: 9  Location: -07/007-A Reason for admit: Spinal stenosis [M48.00]  Spinal stenosis, unspecified spinal region [M48.00]  Midline low back pain without sciatica, unspecified chronicity [M54.50]   Procedure:    POD 4 , L1, L2, L3 and L4 laminectomies and a left side L2-L3  lumbar disk excision. Barriers to Discharge: Placed on bedrest, r/t headache. Plan to dangle feet at bedside this evening. + UTI, on IV ancef. Pain control. PCP: Mimi Phipps MD  Readmission Risk Score: 15.4 ( )%  Patient Goals/Plan/Treatment Preferences: Plan IPR if remains a candidate and bed available when medically cleared. Will not require precert.

## 2022-02-09 PROCEDURE — 99231 SBSQ HOSP IP/OBS SF/LOW 25: CPT | Performed by: NURSE PRACTITIONER

## 2022-02-09 PROCEDURE — 6370000000 HC RX 637 (ALT 250 FOR IP): Performed by: PHYSICIAN ASSISTANT

## 2022-02-09 PROCEDURE — 6360000002 HC RX W HCPCS: Performed by: PHYSICIAN ASSISTANT

## 2022-02-09 PROCEDURE — 2580000003 HC RX 258: Performed by: PHYSICIAN ASSISTANT

## 2022-02-09 PROCEDURE — 97116 GAIT TRAINING THERAPY: CPT

## 2022-02-09 PROCEDURE — 1200000000 HC SEMI PRIVATE

## 2022-02-09 PROCEDURE — 97530 THERAPEUTIC ACTIVITIES: CPT

## 2022-02-09 PROCEDURE — 97535 SELF CARE MNGMENT TRAINING: CPT

## 2022-02-09 RX ORDER — ACETAMINOPHEN 325 MG/1
650 TABLET ORAL EVERY 4 HOURS PRN
Status: DISCONTINUED | OUTPATIENT
Start: 2022-02-09 | End: 2022-02-11 | Stop reason: HOSPADM

## 2022-02-09 RX ADMIN — Medication 250 MG: at 09:41

## 2022-02-09 RX ADMIN — OXYCODONE 5 MG: 5 TABLET ORAL at 11:59

## 2022-02-09 RX ADMIN — SODIUM CHLORIDE: 9 INJECTION, SOLUTION INTRAVENOUS at 15:24

## 2022-02-09 RX ADMIN — PIPERACILLIN AND TAZOBACTAM 3375 MG: 3; .375 INJECTION, POWDER, LYOPHILIZED, FOR SOLUTION INTRAVENOUS at 22:04

## 2022-02-09 RX ADMIN — NALOXEGOL OXALATE 12.5 MG: 12.5 TABLET, FILM COATED ORAL at 09:41

## 2022-02-09 RX ADMIN — PIPERACILLIN AND TAZOBACTAM 3375 MG: 3; .375 INJECTION, POWDER, LYOPHILIZED, FOR SOLUTION INTRAVENOUS at 11:08

## 2022-02-09 RX ADMIN — CLOPIDOGREL BISULFATE 75 MG: 75 TABLET ORAL at 09:41

## 2022-02-09 RX ADMIN — METOPROLOL TARTRATE 37.5 MG: 25 TABLET, FILM COATED ORAL at 09:40

## 2022-02-09 RX ADMIN — DIGOXIN 125 MCG: 125 TABLET ORAL at 09:41

## 2022-02-09 RX ADMIN — BISACODYL 5 MG: 5 TABLET ORAL at 09:41

## 2022-02-09 RX ADMIN — SODIUM CHLORIDE: 9 INJECTION, SOLUTION INTRAVENOUS at 05:43

## 2022-02-09 RX ADMIN — HYDROMORPHONE HYDROCHLORIDE 1 MG: 1 INJECTION, SOLUTION INTRAMUSCULAR; INTRAVENOUS; SUBCUTANEOUS at 17:50

## 2022-02-09 RX ADMIN — ATORVASTATIN CALCIUM 10 MG: 10 TABLET, FILM COATED ORAL at 20:25

## 2022-02-09 RX ADMIN — ACETAMINOPHEN 650 MG: 325 TABLET ORAL at 07:42

## 2022-02-09 RX ADMIN — ONDANSETRON 4 MG: 4 TABLET, ORALLY DISINTEGRATING ORAL at 09:52

## 2022-02-09 RX ADMIN — PANTOPRAZOLE SODIUM 40 MG: 40 TABLET, DELAYED RELEASE ORAL at 05:41

## 2022-02-09 RX ADMIN — METOPROLOL TARTRATE 37.5 MG: 25 TABLET, FILM COATED ORAL at 20:25

## 2022-02-09 RX ADMIN — OXYCODONE 10 MG: 5 TABLET ORAL at 20:38

## 2022-02-09 RX ADMIN — ACETAMINOPHEN 650 MG: 325 TABLET ORAL at 20:38

## 2022-02-09 RX ADMIN — FUROSEMIDE 20 MG: 20 TABLET ORAL at 09:41

## 2022-02-09 RX ADMIN — POLYETHYLENE GLYCOL 3350 17 G: 17 POWDER, FOR SOLUTION ORAL at 09:41

## 2022-02-09 RX ADMIN — SENNOSIDES AND DOCUSATE SODIUM 1 TABLET: 50; 8.6 TABLET ORAL at 09:40

## 2022-02-09 ASSESSMENT — PAIN DESCRIPTION - PAIN TYPE
TYPE: ACUTE PAIN

## 2022-02-09 ASSESSMENT — PAIN DESCRIPTION - FREQUENCY
FREQUENCY: CONTINUOUS

## 2022-02-09 ASSESSMENT — PAIN SCALES - GENERAL
PAINLEVEL_OUTOF10: 5
PAINLEVEL_OUTOF10: 4
PAINLEVEL_OUTOF10: 5
PAINLEVEL_OUTOF10: 5
PAINLEVEL_OUTOF10: 8
PAINLEVEL_OUTOF10: 8
PAINLEVEL_OUTOF10: 5
PAINLEVEL_OUTOF10: 3
PAINLEVEL_OUTOF10: 0

## 2022-02-09 ASSESSMENT — PAIN DESCRIPTION - ONSET
ONSET: ON-GOING
ONSET: SUDDEN

## 2022-02-09 ASSESSMENT — PAIN DESCRIPTION - DESCRIPTORS
DESCRIPTORS: ACHING
DESCRIPTORS: ACHING;DISCOMFORT

## 2022-02-09 ASSESSMENT — PAIN DESCRIPTION - ORIENTATION
ORIENTATION: MID
ORIENTATION: MID
ORIENTATION: LOWER

## 2022-02-09 ASSESSMENT — PAIN DESCRIPTION - LOCATION
LOCATION: NECK

## 2022-02-09 NOTE — PROGRESS NOTES
[address] [address]  INPATIENT PHYSICAL THERAPY  DAILY NOTE  [facility] ORTHOPEDICS 7K - 7K-07/007-A  Time In: 4044  Time Out: 1139  Timed Code Treatment Minutes: 27 Minutes  Minutes: 27          Date: 2022  Patient Name: Monika Hutton,  Gender:  female        MRN: [id number]  : 1945  (77 y.o.)     Referring Practitioner: Azra Traore PA-C  Diagnosis: spinal stenosis  Additional Pertinent Hx: Per EMR \"Gail is a 75y/o female known to our office having been seen in the past several months with complaints of low back pain and leg pain. She has been through multiple epidural injections, most recently last month, with some mild-moderate relief of her pain. Over the past several days her pain had been worsening significantly to the point it was completely uncontrolled and her mobility had declined significantly. She called our office and the only recommendation was for her to present to the ED for evaluation and admission for further care. She does have a history of previous lumbar spine surgery roughly 20yrs ago. History of pacemaker, afib and is on plavix as well. She describes a sharp pain traveling from the low back into the left lateral/anterior thigh are. No new changes in bowel/bladder continence. \" L1-L4 Laminectomy with complication (durotomy) on      Prior Level of Function:  Lives With: Alone  Type of Home: House  Home Layout: One level  Home Access: Stairs to enter without rails  Entrance Stairs - Number of Steps: 2 +1  Home Equipment: Rolling walker,Standard walker,Wheelchair-manual,Cane   Bathroom Shower/Tub: Tub/Shower unit  Bathroom Toilet: Handicap height  Bathroom Equipment:  (countertop on one side)  Bathroom Accessibility: Accessible    Receives Help From:  (friend helps with cleaning.)  ADL Assistance: [facility] [address]: Independent  Homemaking Responsibilities: Yes  Ambulation Assistance: Independent  Transfer Assistance: Independent  Active :  Yes  Additional Comments: Pt IND prior, family in the area to help as needed. Pt was using cane about 1-2 weeks prior to admission due to pain    Restrictions/Precautions:  Restrictions/Precautions: General Precautions,Fall Risk,Surgical Protocols  Required Braces or Orthoses  Other: Abdominal Binder  Position Activity Restriction  Spinal Precautions: No Bending,No Lifting,No Twisting  Other position/activity restrictions: L1-L4 Laminectomy with complication (durotomy) on 2/4, monitor for HA, return to supine if HA     SUBJECTIVE: OK to see pt per nursing and ortho note. Pt in bed when PT arrived, sleeping, easily aroused. Pt agreeable to PT session, willing to complete mobility and requesting to use the restroom. Pt denied HA or any pain all throughout session. PAIN: denies, reports \"weakness in posterior neck. \"    Vitals: Vitals not assessed per clinical judgement, see nursing flowsheet    OBJECTIVE:  Bed Mobility:  Rolling to Left: Stand By Assistance, X 1, with head of bed flat, with rail, with increased time for completion   Supine to Sit: Stand By Assistance, X 1, with head of bed flat, with rail, with increased time for completion  Cues for technique with good demo  Transfers:  Sit to Stand: [address], X 1, cues for hand placement, with verbal cues  Stand to Sit:Contact Guard Assistance, X 1, cues for hand placement, with verbal cues  RW for support.  Pt completed from various surfaces and heights during session, no LOB noted  Ambulation:  Stand By Assistance, Contact Guard Assistance, X 1, with cues for safety, with verbal cues , with increased time for completion  Distance: 10 feet x2, 70 feet  Surface: Level Tile  Device:Rolling Walker  Gait Deviations:  Slow Tianna, Decreased Step Length Bilaterally and Decreased Gait Speed  Cues for safety, no LOB noted with turns and mobility  Balance:  Static Sitting Balance:  Supervision to complete toileting and sitting on EOB, donned abdominal binder in sitting  Dynamic Sitting Balance: Supervision to complete sarahi care  Static Standing Balance: Stand By Assistance, Contact Guard Assistance, X 1  Dynamic Standing Balance: Contact Guard Assistance, X 1, with cues for safety, with verbal cues. Donning and doffing brief and to wash hands at sink, RW for support as needed. Functional Outcome Measures: Completed  AM-PAC Inpatient Mobility Raw Score : 17  AM-PAC Inpatient T-Scale Score : 42.13    ASSESSMENT:  Assessment: Patient progressing toward established goals. Activity Tolerance:  Patient tolerance of  treatment: fair. Equipment Recommendations:Equipment Needed: No  Discharge Recommendations: Continue to assess pending progress and Patient would benefit from continued PT at discharge  Plan: Times per week: 6x O  Current Treatment Recommendations: Strengthening,Neuromuscular Re-education,Home Exercise Program,Safety Education & Óscar [place] Training,Patient/Caregiver Education & Training,Functional Mobility Training,Equipment Evaluation, Education, & procurement,Transfer Training,Gait Training,Stair training    Patient Education  Patient Education: Plan of Care, Precautions/Restrictions, [employer], Equipment Education, Transfers, Gait, Use of [employer],  - Patient Verbalized Understanding, - Patient Requires Continued Education    Goals:  Patient goals : \"feel better\"  Short term goals  Time Frame for Short term goals: by discharge  Short term goal 1: bed mobility with HOB flat, no rails, mod I for increased functional ind  Short term goal 2: Pt will demo IND with transfers with RW for support to progress with mobility. Short term goal 3: Pt will amb with RW for >100 feet with IND to progress towards PLOF. Short term goal 4: Pt will negotiate steps for PHYLLIS the home with S for safety to progress towards PLOF. Short term goal 5: Pt will demo S for car transfer with good sequencing to progress with mobility.   Long term goals  Time Frame for Long term goals : NA due to short ELOS    Following session, patient left in safe position with all fall risk precautions in place. Pt in bedside chair following session, all needs and call light in reach with chair alarm on.

## 2022-02-09 NOTE — PROGRESS NOTES
Physical Medicine & Rehabilitation   Progress Note    Chief Complaint:  Lumbar stenosis. S/p lumbar espinoza with CSF leak. Rehab needs    Subjective:  Patient seen resting in bed. Patient looking forward to getting up today. Discussed awaiting IPR until able to tolerate therapy without increased drainage or headache. Patient understanding. Rehabilitation:  PT: Reviewed. OT: Reviewed. Review of Systems:  CONSTITUTIONAL:  positive for  fatigue  EYES:  negative  HEENT:  negative  RESPIRATORY:  negative  CARDIOVASCULAR:  negative  GASTROINTESTINAL:  Negative for constipation . +BM 2/7/22  GENITOURINARY:  negative  SKIN:  Lumbar incision  HEMATOLOGIC/LYMPHATIC:  negative  MUSCULOSKELETAL:  positive for pain and muscle weakness, Rheumatoid arthritis  NEUROLOGICAL:  positive for headaches, gait problems, weakness and pain  BEHAVIOR/PSYCH:  negative  System review otherwise negative      Objective:  BP (!) 140/64   Pulse 73   Temp 97.7 °F (36.5 °C) (Oral)   Resp 18   Ht 5' 1\" (1.549 m)   Wt 115 lb (52.2 kg)   SpO2 96%   BMI 21.73 kg/m²   awake  Orientation:   person, place, time  Mood: within normal limits  Affect: calm  General appearance: Patient is well nourished, well developed, well groomed and in no acute distress, appearing stated age     Memory:   normal,   Attention/Concentration: normal  Language:  normal     Cranial Nerves:  cranial nerves II-XII are grossly intact  ROM:  abnormal - limited lumbar  Tone:  normal  Muscle bulk: within normal limits  Sensory:  Sensory intact    Skin: warm and dry, no rash or erythema. Lumbar incision not visualized. Two drains noted from below dressing. Peripheral vascular: Pulses: Normal upper and lower extremity pulses; Edema: trace      Diagnostics:   No results found for this or any previous visit (from the past 24 hour(s)). Impression:  · Lumbar stenosis  ? L1-L4 laminectomies and a left side L2-L3 lumbar disk excision, CSF leak.  On 2/4/22 with  Fumich  · Lumbar pain with radiculopathy - improved post op  · Left leg weakness  · Atrial septal defect   · Sick sinus syndrome with pacemaker  · PAF  · CAD with hx stent  · HFpEF  · Rheumatoid arthritis  · COPD  · HTN  · HLD  · GERD  ·     Plan:  · Continue current therapies   · Monitor therapy progress and drain output. To work with therapy today, will monitor next 24 hours. Feel will be appropriate for IPR once medically stable and cleared to complete 3 hours of therapy a day. IPR bed possibly available later this week.    · Will follow       Missed Therapy Time:  · None    Alia Spring, APRN - CNP

## 2022-02-09 NOTE — PLAN OF CARE
Problem: Falls - Risk of:  Goal: Will remain free from falls  Description: Will remain free from falls  2/9/2022 0011 by Vivi Jones RN  Outcome: Ongoing  Note:   Pt. Remains free from falls. Pt. gait is steady. Pt. Uses walker, gait belt and socks. Bed alarm in place to prevent falls. Pt. Understands she can not get up on her own. Problem: Pain:  Goal: Pain level will decrease  Description: Pain level will decrease  2/9/2022 0011 by Vivi Jones RN  Outcome: Ongoing  Note:  Patient pain level is 2/10 . Patient's stated pain goal is 2. Will continue to monitor and administer pain medication as appropriate. Problem: Musculor/Skeletal Functional Status  Goal: Highest potential functional level  2/9/2022 0011 by Vivi Jones RN  Outcome: Ongoing  Note: Pt. Is able to move all extremities and no numbness or tingling noted. Problem: Skin Integrity:  Goal: Absence of new skin breakdown  Description: Absence of new skin breakdown  2/9/2022 0011 by Vivi Jones RN  Outcome: Ongoing  Note: Pt. Does not have any new skin breakdowns. Pt. Has Mepilex pad on heels for protection. Pt. Has EPC creamed applied to bottom. Pt. Is a turn due to being bedrest.     Problem: Neurological  Goal: Maximum potential motor/sensory/cognitive function  2/9/2022 0011 by Vivi Jones RN  Outcome: Ongoing  Note:    Patient alert and oriented x 4. Denies any numbness or tingling in the extremities. Problem: Cardiovascular  Goal: No DVT, peripheral vascular complications  3/6/8484 1924 by Vivi Jones RN  Outcome: Ongoing  Note: No s/sx of DVT during this shift. Patient is on lovenox for prevention. Problem: GI  Goal: No bowel complications  5/1/1674 5557 by Vivi Jones RN  Outcome: Ongoing  Note: Bowel sounds active x 4. Patient is passing gas. Pt. Is soft and non tender with no expression of pain.      Problem:   Goal: Adequate urinary output  2/9/2022 0011 by Vivi Jones RN  Outcome: Ongoing  Note:    Patient has voided adequate amounts of urine that is occasionally blood tinged. Problem: Daily Care:  Goal: Daily care needs are met  Description: Daily care needs are met  2/9/2022 0011 by General Delfin RN  Outcome: Ongoing  Note: Patient requires minimal assistance with daily care needs. Problem: Discharge Planning:  Goal: Patients continuum of care needs are met  Description: Patients continuum of care needs are met  2/9/2022 0011 by General Delfin RN  Outcome: Ongoing  Note:   Discharge planning in progress. Patient is planning to return home at discharge. Case management assisting with discharge needs. Care plan reviewed with patient. Patient verbalize understanding of the plan of care and contribute to goal setting.

## 2022-02-09 NOTE — PLAN OF CARE
Hospitalist consulted for HTN initially. Pt found to have UTI. Urine culture growing Proteus & Pseudomonas. Transitioned patient to IV Zosyn to cover both species. When patient is being discharged, will transition to PO ABX.      Electronically signed by ROXANA Pozo on 2/9/2022 at 12:05 PM

## 2022-02-09 NOTE — PROGRESS NOTES
Patient's physical assessment shows no change from previous assessment done this morning. Patient has two Hemovac's on her right lower back with a three bandages covering them. Bandages are dry and intact. Patient states that the bandages are itchy. The first Hemovac is completely compressed while the other is compressed at 50% draining. Drainage is a bright red tone and appears clear. No clots noted. Patient has complaints of pain in her neck and asks for a heat compress. K-pad applied to her lower neck to help relieve pain. Patient is up in the chair resting. Chair alarm is on, call light and bedside table is within reach.

## 2022-02-09 NOTE — CARE COORDINATION
2/9/22, 1:09 PM EST    DISCHARGE ON GOING EVALUATION    [address] day: 10  7k07  Procedure: POD #5 , L1, L2, L3 and L4 laminectomies and a left side L2-L3  lumbar disk excision. Barriers to Discharge: PT/OT and up to chair for short time, unable to tolerate for very long. Monitor dressing and drain outputs. Drain compressed 50%. Movantik/Senokot given, IV Zosyn for UTI. Patient Goals/Plan/Treatment Preferences: following for possible IP rehab when medically ready.

## 2022-02-09 NOTE — PROGRESS NOTES
Patient not tolerating sitting up very long. Up to chair for a few minutes and pain at the base of her neck and nausea. Patient was in good spirits when getting up but the longer she sat up the worse she felt. Blood pressure obtained and recorded. medication giver per [facility]

## 2022-02-09 NOTE — PLAN OF CARE
Problem: Falls - Risk of:  Goal: Will remain free from falls  Description: Will remain free from falls  Outcome: Ongoing  Note: Patient free of falls this shift. Current bedrest orders in place     Problem: Pain:  Goal: Pain level will decrease  Description: Pain level will decrease  Outcome: Ongoing  Note: Patient taking pain medication on MAR as needed to control pain. Use of non-pharmacologic pain management including ice/repositioning. Patient pain goal is 3/10. Problem: Musculor/Skeletal Functional Status  Goal: Highest potential functional level  Outcome: Ongoing  Note: Bedrest orders in place. Working with PT/OT      Problem: Skin Integrity:  Goal: Absence of new skin breakdown  Description: Absence of new skin breakdown  Outcome: Ongoing  Note: Preventative dressings applied to bilateral heels and coccyx this shift to prevent new skin issues. No new issues observed this shift      Problem: Neurological  Goal: Maximum potential motor/sensory/cognitive function  Outcome: Ongoing  Note:   Patient is alert and oriented x4. verbalizes understanding of when to ask for help. Problem: Cardiovascular  Goal: No DVT, peripheral vascular complications  Outcome: Ongoing  Note: Pt with/without s/s of DVT. Pt able to take prescribed anticoagulants and sandy hose and SCD,S in place to help prevent development of DVT. Problem: GI  Goal: No bowel complications  Outcome: Ongoing  Note: Bowel sounds active. Passing gas. No BM noted this shift. Problem:   Goal: Adequate urinary output  Outcome: Ongoing  Note: Pt voiding adequate/inadequate amounts without difficulty. Problem: Daily Care:  Goal: Daily care needs are met  Description: Daily care needs are met  Outcome: Ongoing  Note: Daily care needs met this shift.       Problem: Discharge Planning:  Goal: Patients continuum of care needs are met  Description: Patients continuum of care needs are met  Outcome: Ongoing     Care plan reviewed with patient verbalized understanding of the plan of care and contribute to goal setting.

## 2022-02-09 NOTE — PROGRESS NOTES
Department of Orthopedic Surgery  Spine Service  Progress Note        Subjective:    2/1/22  Doroteo Inman is resting in the chair. Doing better with pain medication. Cardio consulted, planning stress test today. Holding Plavix with planned surgery Friday possibly pending cardio recs. 2/2/22  Gail is resting in bed. Doing well. Stress test completed yesterday. Await full cardio recommendation, showed no new ischemia. Likely to proceed with surgery Friday. 2/3/22  Gail is resting in the chair. Surgically cleared by Cardio. Planning surgical intervention tomorrow afternoon. NPO at midnight. 2/4/22 Surgery    2/5/22 POD 1  Gail is resting in bed on bedrest. N with small amounts of vomiting. Believes caused by Percocet. Will d/c and add Kerry panel since patient tolerated in past. Muscle spasms uncontrolled, d/c flexeril, add Valium. Denies HA. Drain output dark red. Relief of LLE radicular symptoms. Will slowly advance activity level with HOB at 30 degrees with HA precautions. 2/6/22 POD 2  Gail is resting in bed with HOB at 30 degrees. No HA. One drain with increased output of 240 and other at 0. Light red color. Pain better controlled with Kerry and Valium. N/V relieved. Continued relief of LLE radicular symptoms. Will restart plavix today. Continue bedrest, will dangle feet at bedside today with HA precautions    2/7/22 POD 3  Gail is resting in bed. Developed N yesterday with poor appetite, better this morning. Poor nutritional intake, will consult nutritionist. Dangled feet at bedside, denies HA. Drain output continues to be elevated with one having light red with approximately 360. Patient would like to sit in chair today. Ok to discontinue bedrest and sit patient in chair with HA precautions. Elevated BP, IM reconsulted, added IV hydralazine, if not improved will have Cardio for re-eval.     2/8/22 POD 4  Gail is resting in bed.  Developed a posterior HA last night when getting up from the chair which was relieved laying flat. Will return back to bedrest, hold PT. HOB no more than 30 degrees. IP Rehab consulted and patient is candidate when medically cleared and able to undergo 3 hours of PT/daily. +UTI, hematuria. On Ancef per IM    2/9/22 POD 5  Gail is resting in bed, feels better today. Denies any HA. Drain output decreasing, continued light red. +culture UTI Pseudomonas, IM managing abx. Will advance activity as tolerated, HA precautions. Glorine Roche to work with therapy. Continued plan of IP Rehab    Vitals  VITALS:  BP (!) 151/70   Pulse 74   Temp 98.2 °F (36.8 °C) (Oral)   Resp 18   Ht 5' 1\" (1.549 m)   Wt 115 lb (52.2 kg)   SpO2 96%   BMI 21.73 kg/m²   24HR INTAKE/OUTPUT:      Intake/Output Summary (Last 24 hours) at 2/9/2022 4381  Last data filed at 2/9/2022 8060  Gross per 24 hour   Intake 3201.64 ml   Output 2035 ml   Net 1166.64 ml     URINARY CATHETER OUTPUT (Garcia):     DRAIN/TUBE OUTPUT:  Closed/Suction Drain Left Back Accordion 10 Irish-Output (ml): 80 ml  Closed/Suction Drain Right Back Accordion 10 Irish-Output (ml): 0 ml      PHYSICAL EXAM:    Orientation:  alert and oriented to person, place and time    Lower Extremity Motor :  quadriceps, extensor hallucis longus, dorsiflexion, plantarflexion 5/5 bilaterally  Lower Extremity Sensory:  Intact L1-S1    Flatus:  positive    ABNORMAL EXAM FINDINGS:  none    LABS:    HgB:    Lab Results   Component Value Date    HGB 10.2 02/07/2022         ASSESSMENT AND PLAN:    POD 5, L1-L4 lami with durotomy leak and repair  Lumbar spinal stenosis  Lumbar radiculopathy  Low back pain     1:  Monitor labs and drain output; 50%  2:  Cardio cleared, appreciate recs  3:  Take off bedrest, advance as tolerated with HA precautions. 4:  Pain better controlled, continue Kerry and Valium   5: Discharge pending clinical course.  Planning IP Rehab.   6: Nausea, resolved  7: Elevated BP, IM following, added IV hydralazine prn  8: Restarted Plavix 2/6/22  9: +UTI culture +pseudomonas, on Ancef, IM following.

## 2022-02-09 NOTE — PROGRESS NOTES
[address] [facility]  Occupational Therapy  Daily Note  Time:   Time In: 3545  Time Out: 45  Timed Code Treatment Minutes: 28 Minutes  Minutes: 28          Date: 2022  Patient Name: Gina Acosta,   Gender: female      Room: [facility]007-A  MRN: [id number]  : 1945  (77 y.o.)  Referring Practitioner: JEROME Avelar PA-C  Diagnosis: spinal stenosis  Additional Pertinent Hx: Per EMR \"Gail is a 75y/o female known to our office having been seen in the past several months with complaints of low back pain and leg pain. She has been through multiple epidural injections, most recently last month, with some mild-moderate relief of her pain. Over the past several days her pain had been worsening significantly to the point it was completely uncontrolled and her mobility had declined significantly. She called our office and the only recommendation was for her to present to the ED for evaluation and admission for further care. She does have a history of previous lumbar spine surgery roughly 20yrs ago. History of pacemaker, afib and is on plavix as well. She describes a sharp pain traveling from the low back into the left lateral/anterior thigh are. No new changes in bowel/bladder continence.  \" L1-L4 Laminectomy with complication (durotomy) on     Restrictions/Precautions:  Restrictions/Precautions: General Precautions,Fall Risk,Surgical Protocols  Required Braces or Orthoses  Other: Abdominal Binder  Position Activity Restriction  Spinal Precautions: No Bending,No Lifting,No Twisting  Other position/activity restrictions: L1-L4 Laminectomy with complication (durotomy) on , monitor for HA, return to supine if HA     SUBJECTIVE: Pt laying in bed upon arrival, pt agreeable to OT session, student RN's present during session, RN gave verbal approval for session    PAIN: 0/10: however just reports tension with neck    Vitals: Blood Pressure: 123/61 supine, 130/65 sitting    COGNITION: Slow Processing, Impaired Memory, Decreased Problem Solving and Decreased Safety Awareness    ADL:   Upper Extremity Dressing: Maximum Assistance. for donning abdominal binder sitting at the EOB  Toileting: Moderate Assistance. for completion  Toilet Transfer: [employer]. with pt using grab bar. BALANCE:  Standing Balance: Contact Guard Assistance. with RW, with BUE support on walker    BED MOBILITY:  Supine to Sit: Contact Guard Assistance for safety with HOB elevatesd    TRANSFERS:  Sit to Stand:  Contact Guard Assistance. from EOB  Stand to Sit: Contact Guard Assistance. to recliner    FUNCTIONAL MOBILITY:  Assistive Device: Rolling Walker  Assist Level:  Contact Guard Assistance. Distance: To and from bathroom       ADDITIONAL ACTIVITIES:  Pt given ice pack for neck following toileting, with pt stating increased comfort noted. ASSESSMENT:     Activity Tolerance:  Patient tolerance of  treatment: good.        Discharge Recommendations: Inpatient Rehabilitation  Equipment Recommendations:    Plan: Times per week: 6x  Times per day: Daily  Current Treatment Recommendations: Strengthening,Endurance Training,Self-Care / ADL,Patient/Caregiver Education & Carliss Moll Management,Home Management Training,Functional Mobility Training,Safety Education & Training    Patient Education  Patient Education: Importance of Increasing Activity    Goals  Short term goals  Time Frame for Short term goals: by discharge  Short term goal 1: pt will complete BADL routine with S and using AE prn, while requiring 0 vc's to maitnain back precautions to increase indep with self care tasks  Short term goal 2: pt will tolerate dynamic standing  x 5 min with S to increase endurance for sinkside ADL routine  Short term goal 3: pt will complete functional mobility to/from bathroom with S using AD to access ADLs  Short term goal 4: pt will complete simulated homemaking tasks with SBA and no cues for back safety  Long term goals  Time Frame for Long term goals : not est due to est short LOS    Following session, patient left in safe position with all fall risk precautions in place.

## 2022-02-10 LAB
ORGANISM: ABNORMAL
ORGANISM: ABNORMAL
URINE CULTURE REFLEX: ABNORMAL
URINE CULTURE REFLEX: ABNORMAL

## 2022-02-10 PROCEDURE — 6360000002 HC RX W HCPCS: Performed by: PHYSICIAN ASSISTANT

## 2022-02-10 PROCEDURE — 97535 SELF CARE MNGMENT TRAINING: CPT

## 2022-02-10 PROCEDURE — 97530 THERAPEUTIC ACTIVITIES: CPT

## 2022-02-10 PROCEDURE — 2580000003 HC RX 258: Performed by: PHYSICIAN ASSISTANT

## 2022-02-10 PROCEDURE — 6370000000 HC RX 637 (ALT 250 FOR IP): Performed by: PHYSICIAN ASSISTANT

## 2022-02-10 PROCEDURE — 6370000000 HC RX 637 (ALT 250 FOR IP): Performed by: NURSE PRACTITIONER

## 2022-02-10 PROCEDURE — 97116 GAIT TRAINING THERAPY: CPT

## 2022-02-10 PROCEDURE — 1200000000 HC SEMI PRIVATE

## 2022-02-10 PROCEDURE — 99232 SBSQ HOSP IP/OBS MODERATE 35: CPT | Performed by: NURSE PRACTITIONER

## 2022-02-10 PROCEDURE — 99231 SBSQ HOSP IP/OBS SF/LOW 25: CPT | Performed by: NURSE PRACTITIONER

## 2022-02-10 PROCEDURE — 97110 THERAPEUTIC EXERCISES: CPT

## 2022-02-10 RX ORDER — CIPROFLOXACIN 500 MG/1
500 TABLET, FILM COATED ORAL EVERY 12 HOURS SCHEDULED
Status: DISCONTINUED | OUTPATIENT
Start: 2022-02-10 | End: 2022-02-11 | Stop reason: HOSPADM

## 2022-02-10 RX ADMIN — BISACODYL 5 MG: 5 TABLET ORAL at 08:56

## 2022-02-10 RX ADMIN — NALOXEGOL OXALATE 12.5 MG: 12.5 TABLET, FILM COATED ORAL at 08:56

## 2022-02-10 RX ADMIN — ATORVASTATIN CALCIUM 10 MG: 10 TABLET, FILM COATED ORAL at 20:28

## 2022-02-10 RX ADMIN — METOPROLOL TARTRATE 37.5 MG: 25 TABLET, FILM COATED ORAL at 08:57

## 2022-02-10 RX ADMIN — OXYCODONE 10 MG: 5 TABLET ORAL at 08:57

## 2022-02-10 RX ADMIN — SODIUM CHLORIDE, PRESERVATIVE FREE 10 ML: 5 INJECTION INTRAVENOUS at 10:52

## 2022-02-10 RX ADMIN — DIGOXIN 125 MCG: 125 TABLET ORAL at 08:56

## 2022-02-10 RX ADMIN — METOPROLOL TARTRATE 37.5 MG: 25 TABLET, FILM COATED ORAL at 20:28

## 2022-02-10 RX ADMIN — FUROSEMIDE 20 MG: 20 TABLET ORAL at 08:56

## 2022-02-10 RX ADMIN — CIPROFLOXACIN 500 MG: 500 TABLET, FILM COATED ORAL at 13:52

## 2022-02-10 RX ADMIN — PANTOPRAZOLE SODIUM 40 MG: 40 TABLET, DELAYED RELEASE ORAL at 04:56

## 2022-02-10 RX ADMIN — OXYCODONE 10 MG: 5 TABLET ORAL at 16:32

## 2022-02-10 RX ADMIN — PIPERACILLIN AND TAZOBACTAM 3375 MG: 3; .375 INJECTION, POWDER, LYOPHILIZED, FOR SOLUTION INTRAVENOUS at 04:56

## 2022-02-10 RX ADMIN — Medication 250 MG: at 13:55

## 2022-02-10 RX ADMIN — SENNOSIDES AND DOCUSATE SODIUM 1 TABLET: 50; 8.6 TABLET ORAL at 10:52

## 2022-02-10 RX ADMIN — CIPROFLOXACIN 500 MG: 500 TABLET, FILM COATED ORAL at 20:28

## 2022-02-10 RX ADMIN — CLOPIDOGREL BISULFATE 75 MG: 75 TABLET ORAL at 08:56

## 2022-02-10 ASSESSMENT — PAIN SCALES - GENERAL
PAINLEVEL_OUTOF10: 1
PAINLEVEL_OUTOF10: 8
PAINLEVEL_OUTOF10: 0
PAINLEVEL_OUTOF10: 7
PAINLEVEL_OUTOF10: 4

## 2022-02-10 ASSESSMENT — PAIN DESCRIPTION - DESCRIPTORS
DESCRIPTORS: ACHING
DESCRIPTORS: ACHING

## 2022-02-10 ASSESSMENT — PAIN DESCRIPTION - ORIENTATION: ORIENTATION: LOWER

## 2022-02-10 ASSESSMENT — PAIN DESCRIPTION - PAIN TYPE
TYPE: ACUTE PAIN
TYPE: ACUTE PAIN

## 2022-02-10 ASSESSMENT — PAIN DESCRIPTION - FREQUENCY
FREQUENCY: INTERMITTENT
FREQUENCY: CONTINUOUS

## 2022-02-10 ASSESSMENT — PAIN SCALES - WONG BAKER: WONGBAKER_NUMERICALRESPONSE: 0

## 2022-02-10 ASSESSMENT — PAIN DESCRIPTION - LOCATION
LOCATION: BACK
LOCATION: HEAD;NECK

## 2022-02-10 NOTE — CARE COORDINATION
[address] day: 11 7k07  Procedure: POD #6  L1, L2, L3 and L4 laminectomies with complication (durotomy)  and a left side L2-L3 lumbar disk excision. Barriers to Discharge: remove drain #2, monitor drain output drain #1, therapies, pain control. + for headache. Patient Goals/Plan/Treatment Preferences: IP rehab when able to tolerate 3 hrs therapy.

## 2022-02-10 NOTE — PROGRESS NOTES
[address] [address]  PHYSICAL THERAPY MISSED TREATMENT NOTE  ACUTE CARE  [facility] 7K              Missed Treatment  Pt on hold at time of attempt secondary to ongoing headache since OT session this morning. Will check back later this date as able.

## 2022-02-10 NOTE — PROGRESS NOTES
Hospitalist Progress Note    Patient:  Dk Parra      Unit/Bed:7K-07/007-A    YOB: 1945    MRN: [id number]       Acct: [de-identified]     PCP: Tanvir Freitas MD    Date of Admission: 1/30/2022    Assessment/Plan:    1. UTI with Proteus hauseri and Pseudomonas aeruginosa, possibly secondary to Garcia catheter--Zosyn (S) 2/9-2/10 and will transition to Cipro 500 mg twice a day for 7 days as susceptible to both organisms  2. Essential hypertension, uncontrolled--stable, on Lasix, Lopressor; monitor  3. Moderate malnutrition--per dietitian  4. CAD status post PCI, PPM, watchman--appreciate cardiology input; on statin, Plavix, Lanoxin, Lopressor  5. Rheumatoid arthritis--on Rheumatrex  6. Lumbar spinal stenosis with left lower extremity radiculopathy status post L1, L2, L3 and L4 laminectomies in the left side L2-L3 lumbar disc excision on 2/4/2022--per orthopedics    Expected discharge date: Per primary    Disposition:    [] Home       [] TCU       [] Rehab       [] Psych       [] SNF       [] [place]       [] Other-    Chief Complaint: Following for UTI and high blood pressure    Hospital Course: Per consult note dated 1/30: \"Gailcam Guzmanbaldo ال[age] y.o. female who we are asked to see/evaluate by Chuck Camacho MD for medical management of rheumatoid arthritis. Cardiology has been consulted on cardiology related matters.     Patient has several months history of low back pain that radiates to the left groin and left buttock with radiation down the left leg. The patient has had epidural injections at [facility] as late as 1/17/2022. The patient states this weekend her pain was very intense and she was referred to the ED for admission for surgery. \"    2/10--> awaiting urine culture on Pseudomonas, hemodynamically stable; laboratory studies normal on 2/7 except mildly anemic; call lab to check on Pseudomonas culture and should be resulting soon~both susceptible to Zosyn and Cipro so we will convert to Cipro as oral agent     Subjective (past 24 hours): c/o a headache rates 6/10; eating well; offers no other complaints at this time    Medications:  Reviewed    Infusion Medications    sodium chloride 100 mL/hr at 02/09/22 1524    sodium chloride       Scheduled Medications    piperacillin-tazobactam  3,375 mg IntraVENous Q8H    sodium chloride flush  5-40 mL IntraVENous 2 times per day    polyethylene glycol  17 g Oral Daily    bisacodyl  5 mg Oral Daily    sennosides-docusate sodium  1 tablet Oral BID    naloxegol  12.5 mg Oral QAM    methotrexate  22.5 mg Oral Weekly    digoxin  125 mcg Oral Daily    furosemide  20 mg Oral Daily    magnesium oxide  250 mg Oral Daily    metoprolol tartrate  37.5 mg Oral BID    atorvastatin  10 mg Oral Nightly    pantoprazole  40 mg Oral QAM AC    clopidogrel  75 mg Oral QAM     PRN Meds: acetaminophen, magic (miracle) mouthwash, hydrALAZINE, oxyCODONE **OR** oxyCODONE, diazePAM, sodium chloride flush, sodium chloride, ondansetron **OR** ondansetron, magnesium hydroxide, bisacodyl, HYDROmorphone, HYDROmorphone, nitroGLYCERIN      Intake/Output Summary (Last 24 hours) at 2/10/2022 0710  Last data filed at 2/10/2022 2604  Gross per 24 hour   Intake 444 ml   Output 1290 ml   Net -846 ml       Diet:  ADULT DIET; Regular  ADULT ORAL NUTRITION SUPPLEMENT; Breakfast, Lunch, Dinner; Other Oral Supplement; activia and hot beverage at B,L and D  ADULT ORAL NUTRITION SUPPLEMENT; Breakfast, Lunch, Dinner; Standard 4 oz Oral Supplement    Exam:  /60   Pulse 66   Temp 97.5 °F (36.4 °C) (Oral)   Resp 16   Ht 5' 1\" (1.549 m)   Wt 115 lb (52.2 kg)   SpO2 97%   BMI 21.73 kg/m²     General appearance: No apparent distress, appears stated age and cooperative. HEENT: Pupils equal, round, and reactive to light. Conjunctivae/corneas clear. Neck: Supple, with full range of motion. No jugular venous distention. Trachea midline.   Respiratory:  Normal respiratory effort. Clear to auscultation, bilaterally without Rales/Wheezes/Rhonchi. Cardiovascular: Regular rate and rhythm with normal S1/S2 without murmurs, rubs or gallops. Abdomen: Soft, non-tender, non-distended with normal bowel sounds. Musculoskeletal: passive and active ROM x 4 extremities. Skin: Skin color, texture, turgor normal.    Neurologic:  Neurovascularly intact without any focal sensory/motor deficits. Cranial nerves: II-XII intact, grossly non-focal.  Psychiatric: Alert and oriented, thought content appropriate  Capillary Refill: Brisk,< 3 seconds   Peripheral Pulses: +2 palpable, equal bilaterally     Microbiology:    Urine culture with Proteus and Pseudomonas    Urinalysis:      Lab Results   Component Value Date    NITRU POSITIVE 02/07/2022    WBCUA >200W/CLUMPS 02/07/2022    BACTERIA NONE SEEN 02/07/2022    RBCUA > 200 02/07/2022    BLOODU LARGE 02/07/2022    GLUCOSEU NEGATIVE 02/07/2022       Radiology:  ECHO Complete 2D W Doppler W Color    Summary  Normal left ventricle size and systolic function. Ejection fraction was estimated at 55 %. There were no regional left ventricular wall motion abnormalities and wall  thickness was within normal limits. The left atrium is Moderately dilated. Small secundum atrial septal defect with left-to-right shunt was  visualized with velocity of 1.5 m/sec. Mild to Moderately enlarged right atrium size. XR CHEST PORTABLE    Result Date: 1/30/2022  PROCEDURE: XR CHEST PORTABLE CLINICAL INFORMATION: Back pain COMPARISON: None TECHNIQUE: AP portable chest radiograph performed. FINDINGS: Cardiac conduction device is seen with 2 leads. The leads are intact. Hazy opacities are seen in the right lung base. Cardiac silhouette is mildly enlarged. Aortic arch calcifications. No pleural effusion. No pneumothorax. No acute bony abnormality. Clips in the right upper quadrant may relate to prior cholecystectomy.  Degenerative changes are seen in both shoulders. Old fracture deformity of the right ribs. 1. Nonspecific hazy opacities in the right lung base. Findings can relate to atelectasis or infiltrate. **This report has been created using voice recognition software. It may contain minor errors which are inherent in voice recognition technology. ** Final report electronically signed by Dr Mariana Evans on 1/30/2022 5:54 PM      DVT prophylaxis: [] Lovenox   *Per primary                                 [] SCDs                                 [] SQ Heparin                                 [] Encourage ambulation           [] Already on Anticoagulation     Code Status: Full Code    PT/OT Eval Status: on case    Tele:   [] yes             [x] no    Active Hospital Problems    Diagnosis Date Noted    Moderate malnutrition ([facility] [address] [E44.0] 02/08/2022    Pre-op evaluation [Z01.818]     Abnormal EKG [R94.31]     Coronary artery disease involving native coronary artery of native heart without angina pectoris [I25.10]     Spinal stenosis [M48.00] 01/30/2022    Rheumatoid arthritis ([facility] [address] [M06.9] 07/16/2012       Electronically signed by MEGHAN Colindres CNP on 2/10/2022 at 7:10 AM

## 2022-02-10 NOTE — PROGRESS NOTES
[address] [facility]  Occupational Therapy  Daily Note  Time:   Time In: 3834  Time Out: 7574  Timed Code Treatment Minutes: 25 Minutes  Minutes: 25          Date: 2/10/2022  Patient Name: Luisito Rasheed,   Gender: female      Room: [facility]  MRN: [id number]  : 1945  (77 y.o.)  Referring Practitioner: JEROME Herzog PA-C  Diagnosis: spinal stenosis  Additional Pertinent Hx: Per EMR \"Gail is a 75y/o female known to our office having been seen in the past several months with complaints of low back pain and leg pain. She has been through multiple epidural injections, most recently last month, with some mild-moderate relief of her pain. Over the past several days her pain had been worsening significantly to the point it was completely uncontrolled and her mobility had declined significantly. She called our office and the only recommendation was for her to present to the ED for evaluation and admission for further care. She does have a history of previous lumbar spine surgery roughly 20yrs ago. History of pacemaker, afib and is on plavix as well. She describes a sharp pain traveling from the low back into the left lateral/anterior thigh are. No new changes in bowel/bladder continence.  \" L1-L4 Laminectomy with complication (durotomy) on     Restrictions/Precautions:  Restrictions/Precautions: General Precautions,Fall Risk,Surgical Protocols  Required Braces or Orthoses  Other: Abdominal Binder  Position Activity Restriction  Spinal Precautions: No Bending,No Lifting,No Twisting  Other position/activity restrictions: L1-L4 Laminectomy with complication (durotomy) on , monitor for HA, return to supine if HA     SUBJECTIVE: Pt sitting in recliner upon arrival, pt states she had a bowel movement and needed cleaned up, pt agreeable to OT session with RN giving verbal approval    PAIN: 6/10: headache following toileting    Vitals: Blood Pressure: 130/57  Heart Rate: 74    COGNITION: Slow Processing, Impaired Memory and Decreased Problem Solving    ADL:   Upper Extremity Dressing: Maximum Assistance. for donning abdominal binder  Lower Extremity Dressing: Maximum Assistance. for donning pants this date due to pt c/o headche sitting on toilet  Toileting: Maximum Assistance. with wiping for sarahi care   Toilet Transfer: [employer]. .. BALANCE:  Standing Balance: Contact Guard Assistance. with BUE support on walker with pt standing for 4 minutes during ADL routine with BUE support on walker    BED MOBILITY:  Sit to Supine: Moderate Assistance with bringing BLE back into bed following toileting due to headache    TRANSFERS:  Sit to Stand:  Contact Guard Assistance. from recliner  Stand to Sit: [employer]. back to EOB    FUNCTIONAL MOBILITY:  Assistive Device: Rolling Walker  Assist Level:  Contact Guard Assistance. Distance: To and from bathroom       ADDITIONAL ACTIVITIES:  Pt stated she had a headache after bowel movement sitting on toilet, with pt able to return to bed, and then immediately laid down with RN notified. ASSESSMENT:     Activity Tolerance:  Patient tolerance of  treatment: good.        Discharge Recommendations: Inpatient Rehabilitation  Equipment Recommendations:    Plan: Times per week: 6x  Times per day: Daily  Current Treatment Recommendations: Strengthening,Endurance Training,Self-Care / ADL,Patient/Caregiver Education & Training,Balance Training,Pain Management,Home Management Training,Functional Mobility Training,Safety Education & Training    Patient Education  Patient Education: ADL's    Goals  Short term goals  Time Frame for Short term goals: by discharge  Short term goal 1: pt will complete BADL routine with S and using AE prn, while requiring 0 vc's to maitnain back precautions to increase indep with self care tasks  Short term goal 2: pt will tolerate dynamic standing  x 5 min with S to increase endurance for sinkside ADL routine  Short term goal 3: pt will complete functional mobility to/from bathroom with S using AD to access ADLs  Short term goal 4: pt will complete simulated homemaking tasks with SBA and no cues for back safety  Long term goals  Time Frame for Long term goals : not est due to est short LOS    Following session, patient left in safe position with all fall risk precautions in place.

## 2022-02-10 NOTE — PROGRESS NOTES
[address] [address]  INPATIENT PHYSICAL THERAPY  Daily Note  [facility] ORTHOPEDICS 7K - 7K-07/007-A    Time In: 1350  Time Out: 1445  Timed Code Treatment Minutes: 55 Minutes  Minutes: 55          Date: 2/10/2022  Patient Name: Cordelia Rosales,  Gender:  female        MRN: [id number]  : 1945  (77 y.o.)     Referring Practitioner: Cade Griffin PA-C  Diagnosis: spinal stenosis  Additional Pertinent Hx: Per EMR \"Gail is a 77y/o female known to our office having been seen in the past several months with complaints of low back pain and leg pain. She has been through multiple epidural injections, most recently last month, with some mild-moderate relief of her pain. Over the past several days her pain had been worsening significantly to the point it was completely uncontrolled and her mobility had declined significantly. She called our office and the only recommendation was for her to present to the ED for evaluation and admission for further care. She does have a history of previous lumbar spine surgery roughly 20yrs ago. History of pacemaker, afib and is on plavix as well. She describes a sharp pain traveling from the low back into the left lateral/anterior thigh are. No new changes in bowel/bladder continence. \" L1-L4 Laminectomy with complication (durotomy) on      Prior Level of Function:  Lives With: Alone  Type of Home: House  Home Layout: One level  Home Access: Stairs to enter without rails  Entrance Stairs - Number of Steps: 2 +1  Home Equipment: Rolling walker,Standard walker,Wheelchair-manual,Cane   Bathroom Shower/Tub: Tub/Shower unit  Bathroom Toilet: Handicap height  Bathroom Equipment:  (countertop on one side)  Bathroom Accessibility: Accessible    Receives Help From:  (friend helps with cleaning.)  ADL Assistance: [address]: Independent  Homemaking Responsibilities: Yes  Ambulation Assistance: Independent  Transfer Assistance: Independent  Active :  Yes  Additional Comments: Pt IND prior, family in the area to help as needed. Pt was using cane about 1-2 weeks prior to admission due to pain    Restrictions/Precautions:  Restrictions/Precautions: General Precautions,Fall Risk,Surgical Protocols  Required Braces or Orthoses  Other: Abdominal Binder  Position Activity Restriction  Spinal Precautions: No Bending,No Lifting,No Twisting  Other position/activity restrictions: L1-L4 Laminectomy with complication (durotomy) on 2/4, monitor for HA, return to supine if HA     SUBJECTIVE: Pt. Laying in bed and pleasantly agrees to therapy session. RN present and approves therapy session. Pt. Motivated for session. Pt. Requests to use restroom at beginning of session. Pt. Very talkative throughout session with difficulty staying on task. PAIN: None indicated    Vitals: Vitals not assessed per clinical judgement, see nursing flowsheet    OBJECTIVE:  Bed Mobility:  Rolling to Right: Stand By Assistance   Supine to Sit: Contact Guard Assistance    Transfers:  Sit to Stand: Contact Guard Assistance  Stand to [employer] Assistance    Ambulation:  Stand By Assistance, Contact Guard Assistance  Distance: 15' x 1, 60' x 1  Surface: Level Tile  Device:Rolling Walker  Gait Deviations:  Slow [place], Decreased Step Length Bilaterally, Decreased Weight Shift Left, Decreased Gait Speed, Decreased Heel Strike Bilaterally and Decreased Terminal Knee Extension    Balance:  Static Sitting Balance:  Stand By Assistance  Dynamic Standing Balance: Contact Guard Assistance to complete clothing management and hand hygiene. Extra time required secondary to poor hand dexterity     Exercise:  Patient was guided in 1 set(s) 10-15 reps of exercises: Glut sets, Seated marches, Seated hamstring curls, Seated heel/toe raises, Long arc quads, Seated isometric hip adduction, Seated abduction/adduction, and abdominal isometrics. Exercises were completed for increased independence with functional mobility.  Extra time required secondary to distractibility. Functional Outcome Measures: Not completed       ASSESSMENT:  Assessment: Patient progressing toward established goals. Activity Tolerance:  Patient tolerance of  treatment: good. Equipment Recommendations:Equipment Needed: No  Discharge Recommendations: Continue to assess pending progress,Patient would benefit from continued therapy after discharge, IP Rehab     Plan: Times per week: 6x O  Current Treatment Recommendations: Strengthening,Neuromuscular Re-education,Home Exercise Program,Safety Education & Kayren Herder Training,Patient/Caregiver Education & Training,Functional Mobility Training,Equipment Evaluation, Education, & procurement,Transfer Training,Gait Training,Stair training    Patient Education  Patient Education: Plan of Care, Precautions/Restrictions, [employer] Mobility, Transfers, Reviewed Prior Education, Gait, Health Promotion and Wellness Education, IP rehab expectations    Goals:  Patient goals : \"feel better\"  Short term goals  Time Frame for Short term goals: by discharge  Short term goal 1: bed mobility with HOB flat, no rails, mod I for increased functional ind  Short term goal 2: Pt will demo IND with transfers with RW for support to progress with mobility. Short term goal 3: Pt will amb with RW for >100 feet with IND to progress towards PLOF. Short term goal 4: Pt will negotiate steps for PHYLLIS the home with S for safety to progress towards PLOF. Short term goal 5: Pt will demo S for car transfer with good sequencing to progress with mobility. Long term goals  Time Frame for Long term goals : NA due to short ELOS    Following session, patient left in safe position with all fall risk precautions in place.

## 2022-02-10 NOTE — PROGRESS NOTES
Department of Orthopedic Surgery  Spine Service  Progress Note        Subjective:    2/1/22  Americus Ros is resting in the chair. Doing better with pain medication. Cardio consulted, planning stress test today. Holding Plavix with planned surgery Friday possibly pending cardio recs. 2/2/22  Gail is resting in bed. Doing well. Stress test completed yesterday. Await full cardio recommendation, showed no new ischemia. Likely to proceed with surgery Friday. 2/3/22  Gail is resting in the chair. Surgically cleared by Cardio. Planning surgical intervention tomorrow afternoon. NPO at midnight. 2/4/22 Surgery    2/5/22 POD 1  Agil is resting in bed on bedrest. N with small amounts of vomiting. Believes caused by Percocet. Will d/c and add Kerry panel since patient tolerated in past. Muscle spasms uncontrolled, d/c flexeril, add Valium. Denies HA. Drain output dark red. Relief of LLE radicular symptoms. Will slowly advance activity level with HOB at 30 degrees with HA precautions. 2/6/22 POD 2  Gail is resting in bed with HOB at 30 degrees. No HA. One drain with increased output of 240 and other at 0. Light red color. Pain better controlled with Kerry and Valium. N/V relieved. Continued relief of LLE radicular symptoms. Will restart plavix today. Continue bedrest, will dangle feet at bedside today with HA precautions    2/7/22 POD 3  Gail is resting in bed. Developed N yesterday with poor appetite, better this morning. Poor nutritional intake, will consult nutritionist. Dangled feet at bedside, denies HA. Drain output continues to be elevated with one having light red with approximately 360. Patient would like to sit in chair today. Ok to discontinue bedrest and sit patient in chair with HA precautions. Elevated BP, IM reconsulted, added IV hydralazine, if not improved will have Cardio for re-eval.     2/8/22 POD 4  Gail is resting in bed.  Developed a posterior HA last night when getting up from the chair which was relieved laying flat. Will return back to bedrest, hold PT. HOB no more than 30 degrees. IP Rehab consulted and patient is candidate when medically cleared and able to undergo 3 hours of PT/daily. +UTI, hematuria. On Ancef per IM    2/9/22 POD 5  Gail is resting in bed, feels better today. Denies any HA. Drain output decreasing, continued light red. +culture UTI Pseudomonas, IM managing abx. Will advance activity as tolerated, GARCIA precautions. Elias Jay to work with therapy. Continued plan of IP Rehab    2/10/22 POD 6  Gail is resting in the chair doing well. Denies HA. Drain output in #1 elevated with no output in drain #2. Will remove drain #2 with dressing change. UTI, on IV Zosyn, IM managing. Appreciate recs  Planning IP Rehab, ok to transfer when medically cleared and ready to undergo 3 hrs of PT.      Vitals  VITALS:  /60   Pulse 66   Temp 97.5 °F (36.4 °C) (Oral)   Resp 16   Ht 5' 1\" (1.549 m)   Wt 115 lb (52.2 kg)   SpO2 97%   BMI 21.73 kg/m²   24HR INTAKE/OUTPUT:      Intake/Output Summary (Last 24 hours) at 2/10/2022 6863  Last data filed at 2/10/2022 7428  Gross per 24 hour   Intake 444 ml   Output 1290 ml   Net -846 ml     URINARY CATHETER OUTPUT (Garcia):     DRAIN/TUBE OUTPUT:  Closed/Suction Drain Left Back Accordion 10 Azerbaijani-Output (ml): 90 ml  Closed/Suction Drain Right Back Accordion 10 [place]ani-Output (ml): 0 ml      PHYSICAL EXAM:    Orientation:  alert and oriented to person, place and time    Lower Extremity Motor :  quadriceps, extensor hallucis longus, dorsiflexion, plantarflexion 5/5 bilaterally  Lower Extremity Sensory:  Intact L1-S1    Flatus:  positive    ABNORMAL EXAM FINDINGS:  none    LABS:    HgB:    Lab Results   Component Value Date    HGB 10.2 02/07/2022         ASSESSMENT AND PLAN:    POD 6, L1-L4 lami with durotomy leak and repair  Lumbar spinal stenosis  Lumbar radiculopathy  Low back pain     1:  Monitor labs and drain output; 50%  2:  Cardio cleared, appreciate recs  3:  Take off bedrest, advance as tolerated with HA precautions. 4:  Pain better controlled, continue Kerry and Valium   5: Ok to discharge to IP Rehab when medically cleared. Will continue to follow for drain output it discharged and readmitted to IP Rehab.   6: Nausea, resolved  7: Elevated BP, IM following, added IV hydralazine prn  8: Restarted Plavix 2/6/22  9: +UTI culture +pseudomonas, on IV Zosyn, IM following.

## 2022-02-10 NOTE — PROGRESS NOTES
Physical Medicine & Rehabilitation   Progress Note    Chief Complaint:  Lumbar stenosis. S/p lumbar espinoza with CSF leak. Rehab needs    Subjective:  Patient seen resting in bed. Patient reports headache while up walking. Discussed monitoring symptoms and therapy for appropriateness for IPR. Patient continues with drains, one to be pulled today. Patient understanding with plan. Denies any needs at this time. Rehabilitation:  PT: Reviewed. OT: Reviewed. Review of Systems:  CONSTITUTIONAL:  positive for  fatigue  EYES:  negative  HEENT:  negative  RESPIRATORY:  negative  CARDIOVASCULAR:  negative  GASTROINTESTINAL:  Negative for constipation . +BM 2/9/22  GENITOURINARY:  negative  SKIN:  Lumbar incision  HEMATOLOGIC/LYMPHATIC:  negative  MUSCULOSKELETAL:  positive for pain and muscle weakness, Rheumatoid arthritis  NEUROLOGICAL:  positive for headaches, gait problems, weakness and pain  BEHAVIOR/PSYCH:  negative  System review otherwise negative      Objective:  BP (!) 130/57   Pulse 74   Temp 97.5 °F (36.4 °C) (Oral)   Resp 20   Ht 5' 1\" (1.549 m)   Wt 115 lb (52.2 kg)   SpO2 97%   BMI 21.73 kg/m²   awake  Orientation:   person, place, time  Mood: within normal limits  Affect: calm  General appearance: Patient is well nourished, well developed, well groomed and in no acute distress, appearing stated age     Memory:   normal,   Attention/Concentration: normal  Language:  normal     Cranial Nerves:  cranial nerves II-XII are grossly intact  ROM:  abnormal - limited lumbar  Tone:  normal  Muscle bulk: within normal limits  Sensory:  Sensory intact    Skin: warm and dry, no rash or erythema. Lumbar incision not visualized. Two drains noted from below dressing. Peripheral vascular: Pulses: Normal upper and lower extremity pulses; Edema: trace      Diagnostics:   No results found for this or any previous visit (from the past 24 hour(s)). Impression:  · Lumbar stenosis  ?  L1-L4 laminectomies and a left side L2-L3 lumbar disk excision, CSF leak. On 2/4/22 with Dr Ra Estrada  · Lumbar pain with radiculopathy - improved post op  · Left leg weakness  · Atrial septal defect   · Sick sinus syndrome with pacemaker  · PAF  · CAD with hx stent  · HFpEF  · Rheumatoid arthritis  · COPD  · HTN  · HLD  · GERD  ·     Plan:  · Continue current therapies   · Monitor therapy progress and drain output. Headache today,  will monitor next 24 hours. Feel will be appropriate for IPR once medically stable and symptom free, and cleared to complete 3 hours of therapy a day.   · Will follow       Missed Therapy Time:  · None    Alia Spring, APRN - CNP

## 2022-02-10 NOTE — PROGRESS NOTES
Physician Progress Note      Micaela Moscoso  CSN #:                  [id number]  :                       1945  ADMIT DATE:       2022 3:44 PM  [address] [place] DATE:  RESPONDING  PROVIDER #:        Spencer Rothman APRN - MENA          QUERY TEXT:    Pt admitted with lumbar spinal stenosis with radiculopathy and underwent   surgery on . During surgery, a Garcia catheter was inserted. On , pt   noted to have acute cystitis with hematuria. If possible, please respond   below and document in the progress notes and discharge summary if you are   evaluating and/or treating any of the following: The medical record reflects the following:  Risk Factors: Garcia catheter inserted during surgery, advanced age  Clinical Indicators: During surgery, a Garcia catheter was inserted. On ,   pt noted to have acute cystitis with hematuria  Treatment: IV Rocephin, labs, Hospitalist consult    Thank you! Amada Curran, RN, BSN, RHIT, CCDS  RN Clinical   [phone]  Options provided:  -- UTI due to indwelling urinary catheter, not POA  -- UTI not due to indwelling urinary catheter and not POA  -- UTI POA and not due to indwelling urinary catheter  -- Other - I will add my own diagnosis  -- Disagree - Not applicable / Not valid  -- Disagree - Clinically unable to determine / Unknown  -- Refer to Clinical Documentation Reviewer    PROVIDER RESPONSE TEXT:    UTI is not due to the indwelling urinary catheter and not POA. Query created by: Dinoicio Santos on 2022 12:34 PM      QUERY TEXT:    Pt admitted with lumbar spinal stenosis with radiculopathy. Per dietitian, pt   meets AND/ASPEN criteria for moderate malnutrition.   If possible, please   respond below and document in the progress notes and discharge summary:    The medical record reflects the following:  Risk Factors: chronic HFpEF, COPD/emphysema, advanced age, s/p surgery  Clinical Indicators: meets AND/ASPEN criteria for moderate malnutrition: 1)   Energy Intake: Mild decrease in energy intake (75% or less at least 3 days),    2) Mild body fat loss of orbitals,  3) Mild muscle mass loss of temples; BMI   21  Treatment: dietitian consult, Ensure Compact TID, Protonix, Mag-Ox, IVF,   Zofran, stool softeners, labs    Thank you!     Lucho Horton, RN, BSN, RHIT, CCDS  RN Clinical   [phone]  Options provided:  -- Moderate malnutrition confirmed  -- Moderate malnutrition ruled out  -- Other - I will add my own diagnosis  -- Disagree - Not applicable / Not valid  -- Disagree - Clinically unable to determine / Unknown  -- Refer to Clinical Documentation Reviewer    PROVIDER RESPONSE TEXT:    In note    Query created by: Tania Bullock on 2/9/2022 8:45 AM      Electronically signed by:  Jhoan Walter CNP 2/10/2022 11:38 AM

## 2022-02-11 ENCOUNTER — HOSPITAL ENCOUNTER (INPATIENT)
Age: 77
LOS: 12 days | Discharge: HOME HEALTH | DRG: 559 | End: 2022-02-23
Attending: PHYSICAL MEDICINE & REHABILITATION | Admitting: PHYSICAL MEDICINE & REHABILITATION
Payer: MEDICARE

## 2022-02-11 VITALS
TEMPERATURE: 97.8 F | WEIGHT: 115 LBS | DIASTOLIC BLOOD PRESSURE: 50 MMHG | BODY MASS INDEX: 21.71 KG/M2 | HEIGHT: 61 IN | SYSTOLIC BLOOD PRESSURE: 94 MMHG | HEART RATE: 71 BPM | OXYGEN SATURATION: 100 % | RESPIRATION RATE: 16 BRPM

## 2022-02-11 DIAGNOSIS — I25.10 CORONARY ARTERY DISEASE INVOLVING NATIVE CORONARY ARTERY OF NATIVE HEART WITHOUT ANGINA PECTORIS: ICD-10-CM

## 2022-02-11 DIAGNOSIS — Z98.890 S/P LUMBAR SPINE OPERATION: ICD-10-CM

## 2022-02-11 DIAGNOSIS — I25.10 ASHD (ARTERIOSCLEROTIC HEART DISEASE): Chronic | ICD-10-CM

## 2022-02-11 DIAGNOSIS — Z96.653 H/O TOTAL KNEE REPLACEMENT, BILATERAL: ICD-10-CM

## 2022-02-11 DIAGNOSIS — G97.82 CEREBROSPINAL FLUID LEAK DUE TO LUMBAR SURGERY: ICD-10-CM

## 2022-02-11 DIAGNOSIS — M54.16 SPINAL STENOSIS OF LUMBAR REGION WITH RADICULOPATHY: Primary | ICD-10-CM

## 2022-02-11 DIAGNOSIS — E43 SEVERE MALNUTRITION (HCC): ICD-10-CM

## 2022-02-11 DIAGNOSIS — Z96.643 H/O TOTAL HIP ARTHROPLASTY, BILATERAL: ICD-10-CM

## 2022-02-11 DIAGNOSIS — E44.0 MODERATE MALNUTRITION (HCC): ICD-10-CM

## 2022-02-11 DIAGNOSIS — M20.091 ULNAR DEVIATION OF FINGERS OF BOTH HANDS: ICD-10-CM

## 2022-02-11 DIAGNOSIS — I20.9 ANGINA, CLASS III (HCC): ICD-10-CM

## 2022-02-11 DIAGNOSIS — M20.032 SWAN-NECK DEFORMITY OF FINGER OF BOTH HANDS: ICD-10-CM

## 2022-02-11 DIAGNOSIS — E78.2 MODERATE MIXED HYPERLIPIDEMIA NOT REQUIRING STATIN THERAPY: ICD-10-CM

## 2022-02-11 DIAGNOSIS — M54.50 MIDLINE LOW BACK PAIN WITHOUT SCIATICA, UNSPECIFIED CHRONICITY: ICD-10-CM

## 2022-02-11 DIAGNOSIS — I25.10: Chronic | ICD-10-CM

## 2022-02-11 DIAGNOSIS — M20.031 SWAN-NECK DEFORMITY OF FINGER OF BOTH HANDS: ICD-10-CM

## 2022-02-11 DIAGNOSIS — J44.9 CHRONIC OBSTRUCTIVE PULMONARY DISEASE, UNSPECIFIED COPD TYPE (HCC): ICD-10-CM

## 2022-02-11 DIAGNOSIS — Z98.61: Chronic | ICD-10-CM

## 2022-02-11 DIAGNOSIS — Z95.0 H/O CARDIAC PACEMAKER: ICD-10-CM

## 2022-02-11 DIAGNOSIS — J43.9 PULMONARY EMPHYSEMA, UNSPECIFIED EMPHYSEMA TYPE (HCC): ICD-10-CM

## 2022-02-11 DIAGNOSIS — G96.00 CEREBROSPINAL FLUID LEAK DUE TO LUMBAR SURGERY: ICD-10-CM

## 2022-02-11 DIAGNOSIS — M48.061 SPINAL STENOSIS OF LUMBAR REGION WITH RADICULOPATHY: Primary | ICD-10-CM

## 2022-02-11 DIAGNOSIS — M20.092 ULNAR DEVIATION OF FINGERS OF BOTH HANDS: ICD-10-CM

## 2022-02-11 DIAGNOSIS — R94.31 ABNORMAL EKG: ICD-10-CM

## 2022-02-11 DIAGNOSIS — I10 HYPERTENSION, UNSPECIFIED TYPE: Chronic | ICD-10-CM

## 2022-02-11 DIAGNOSIS — M06.9 RHEUMATOID ARTHRITIS INVOLVING BOTH HANDS, UNSPECIFIED WHETHER RHEUMATOID FACTOR PRESENT (HCC): Chronic | ICD-10-CM

## 2022-02-11 DIAGNOSIS — I48.91 ATRIAL FIBRILLATION, UNSPECIFIED TYPE (HCC): ICD-10-CM

## 2022-02-11 PROBLEM — D64.9 ANEMIA: Status: ACTIVE | Noted: 2022-02-11

## 2022-02-11 PROCEDURE — 97530 THERAPEUTIC ACTIVITIES: CPT

## 2022-02-11 PROCEDURE — 97116 GAIT TRAINING THERAPY: CPT

## 2022-02-11 PROCEDURE — 6360000002 HC RX W HCPCS: Performed by: PHYSICIAN ASSISTANT

## 2022-02-11 PROCEDURE — 1180000000 HC REHAB R&B

## 2022-02-11 PROCEDURE — 6370000000 HC RX 637 (ALT 250 FOR IP): Performed by: PHYSICIAN ASSISTANT

## 2022-02-11 PROCEDURE — 6370000000 HC RX 637 (ALT 250 FOR IP): Performed by: NURSE PRACTITIONER

## 2022-02-11 PROCEDURE — 99222 1ST HOSP IP/OBS MODERATE 55: CPT | Performed by: PHYSICAL MEDICINE & REHABILITATION

## 2022-02-11 PROCEDURE — 97110 THERAPEUTIC EXERCISES: CPT

## 2022-02-11 PROCEDURE — 97535 SELF CARE MNGMENT TRAINING: CPT

## 2022-02-11 PROCEDURE — 99232 SBSQ HOSP IP/OBS MODERATE 35: CPT | Performed by: NURSE PRACTITIONER

## 2022-02-11 PROCEDURE — 2580000003 HC RX 258: Performed by: PHYSICIAN ASSISTANT

## 2022-02-11 RX ORDER — ACETAMINOPHEN 325 MG/1
650 TABLET ORAL EVERY 4 HOURS PRN
Status: CANCELLED | OUTPATIENT
Start: 2022-02-11

## 2022-02-11 RX ORDER — SENNA AND DOCUSATE SODIUM 50; 8.6 MG/1; MG/1
1 TABLET, FILM COATED ORAL 2 TIMES DAILY
Status: DISCONTINUED | OUTPATIENT
Start: 2022-02-11 | End: 2022-02-23 | Stop reason: HOSPADM

## 2022-02-11 RX ORDER — OXYCODONE HYDROCHLORIDE 5 MG/1
5 TABLET ORAL EVERY 4 HOURS PRN
Status: CANCELLED | OUTPATIENT
Start: 2022-02-11

## 2022-02-11 RX ORDER — DIAZEPAM 5 MG/1
5 TABLET ORAL EVERY 6 HOURS PRN
Status: DISCONTINUED | OUTPATIENT
Start: 2022-02-11 | End: 2022-02-23 | Stop reason: HOSPADM

## 2022-02-11 RX ORDER — SODIUM CHLORIDE 0.9 % (FLUSH) 0.9 %
5-40 SYRINGE (ML) INJECTION PRN
Status: CANCELLED | OUTPATIENT
Start: 2022-02-11

## 2022-02-11 RX ORDER — CLOPIDOGREL BISULFATE 75 MG/1
75 TABLET ORAL EVERY MORNING
Status: DISCONTINUED | OUTPATIENT
Start: 2022-02-12 | End: 2022-02-23 | Stop reason: HOSPADM

## 2022-02-11 RX ORDER — OXYCODONE HYDROCHLORIDE 5 MG/1
10 TABLET ORAL EVERY 4 HOURS PRN
Status: CANCELLED | OUTPATIENT
Start: 2022-02-11

## 2022-02-11 RX ORDER — PNV NO.95/FERROUS FUM/FOLIC AC 28MG-0.8MG
250 TABLET ORAL DAILY
Status: DISCONTINUED | OUTPATIENT
Start: 2022-02-12 | End: 2022-02-23 | Stop reason: HOSPADM

## 2022-02-11 RX ORDER — CIPROFLOXACIN 500 MG/1
500 TABLET, FILM COATED ORAL EVERY 12 HOURS SCHEDULED
Status: CANCELLED | OUTPATIENT
Start: 2022-02-11 | End: 2022-02-17

## 2022-02-11 RX ORDER — ATORVASTATIN CALCIUM 10 MG/1
10 TABLET, FILM COATED ORAL NIGHTLY
Status: CANCELLED | OUTPATIENT
Start: 2022-02-11

## 2022-02-11 RX ORDER — OXYCODONE HYDROCHLORIDE 5 MG/1
10 TABLET ORAL EVERY 4 HOURS PRN
Status: DISCONTINUED | OUTPATIENT
Start: 2022-02-11 | End: 2022-02-15

## 2022-02-11 RX ORDER — NITROGLYCERIN 0.4 MG/1
0.4 TABLET SUBLINGUAL EVERY 5 MIN PRN
Status: DISCONTINUED | OUTPATIENT
Start: 2022-02-11 | End: 2022-02-23 | Stop reason: HOSPADM

## 2022-02-11 RX ORDER — BISACODYL 10 MG
10 SUPPOSITORY, RECTAL RECTAL DAILY PRN
Status: CANCELLED | OUTPATIENT
Start: 2022-02-11

## 2022-02-11 RX ORDER — DIGOXIN 125 MCG
125 TABLET ORAL DAILY
Status: CANCELLED | OUTPATIENT
Start: 2022-02-11

## 2022-02-11 RX ORDER — SENNA AND DOCUSATE SODIUM 50; 8.6 MG/1; MG/1
1 TABLET, FILM COATED ORAL 2 TIMES DAILY
Status: CANCELLED | OUTPATIENT
Start: 2022-02-11

## 2022-02-11 RX ORDER — OXYCODONE HYDROCHLORIDE 5 MG/1
5 TABLET ORAL EVERY 4 HOURS PRN
Status: DISCONTINUED | OUTPATIENT
Start: 2022-02-11 | End: 2022-02-15

## 2022-02-11 RX ORDER — PANTOPRAZOLE SODIUM 40 MG/1
40 TABLET, DELAYED RELEASE ORAL
Status: CANCELLED | OUTPATIENT
Start: 2022-02-12

## 2022-02-11 RX ORDER — DIGOXIN 125 MCG
125 TABLET ORAL DAILY
Status: DISCONTINUED | OUTPATIENT
Start: 2022-02-12 | End: 2022-02-23 | Stop reason: HOSPADM

## 2022-02-11 RX ORDER — ACETAMINOPHEN 325 MG/1
650 TABLET ORAL EVERY 4 HOURS PRN
Status: DISCONTINUED | OUTPATIENT
Start: 2022-02-11 | End: 2022-02-11

## 2022-02-11 RX ORDER — CLOPIDOGREL BISULFATE 75 MG/1
75 TABLET ORAL EVERY MORNING
Status: CANCELLED | OUTPATIENT
Start: 2022-02-11

## 2022-02-11 RX ORDER — POLYETHYLENE GLYCOL 3350 17 G/17G
17 POWDER, FOR SOLUTION ORAL DAILY
Status: CANCELLED | OUTPATIENT
Start: 2022-02-11

## 2022-02-11 RX ORDER — PANTOPRAZOLE SODIUM 40 MG/1
40 TABLET, DELAYED RELEASE ORAL
Status: DISCONTINUED | OUTPATIENT
Start: 2022-02-12 | End: 2022-02-23 | Stop reason: HOSPADM

## 2022-02-11 RX ORDER — SODIUM CHLORIDE 0.9 % (FLUSH) 0.9 %
5-40 SYRINGE (ML) INJECTION EVERY 12 HOURS SCHEDULED
Status: CANCELLED | OUTPATIENT
Start: 2022-02-11

## 2022-02-11 RX ORDER — ONDANSETRON 4 MG/1
4 TABLET, FILM COATED ORAL EVERY 8 HOURS PRN
Status: CANCELLED | OUTPATIENT
Start: 2022-02-11

## 2022-02-11 RX ORDER — POLYETHYLENE GLYCOL 3350 17 G/17G
17 POWDER, FOR SOLUTION ORAL DAILY
Status: DISCONTINUED | OUTPATIENT
Start: 2022-02-11 | End: 2022-02-23 | Stop reason: HOSPADM

## 2022-02-11 RX ORDER — PNV NO.95/FERROUS FUM/FOLIC AC 28MG-0.8MG
250 TABLET ORAL DAILY
Status: CANCELLED | OUTPATIENT
Start: 2022-02-11

## 2022-02-11 RX ORDER — CIPROFLOXACIN 500 MG/1
500 TABLET, FILM COATED ORAL EVERY 12 HOURS SCHEDULED
Status: COMPLETED | OUTPATIENT
Start: 2022-02-11 | End: 2022-02-16

## 2022-02-11 RX ORDER — ATORVASTATIN CALCIUM 10 MG/1
10 TABLET, FILM COATED ORAL NIGHTLY
Status: DISCONTINUED | OUTPATIENT
Start: 2022-02-11 | End: 2022-02-23 | Stop reason: HOSPADM

## 2022-02-11 RX ORDER — LANOLIN ALCOHOL/MO/W.PET/CERES
3 CREAM (GRAM) TOPICAL NIGHTLY PRN
Status: DISCONTINUED | OUTPATIENT
Start: 2022-02-11 | End: 2022-02-23 | Stop reason: HOSPADM

## 2022-02-11 RX ORDER — ONDANSETRON 4 MG/1
4 TABLET, FILM COATED ORAL EVERY 8 HOURS PRN
Status: DISCONTINUED | OUTPATIENT
Start: 2022-02-11 | End: 2022-02-23 | Stop reason: HOSPADM

## 2022-02-11 RX ORDER — SODIUM CHLORIDE 0.9 % (FLUSH) 0.9 %
5-40 SYRINGE (ML) INJECTION EVERY 12 HOURS SCHEDULED
Status: DISCONTINUED | OUTPATIENT
Start: 2022-02-11 | End: 2022-02-11

## 2022-02-11 RX ORDER — BISACODYL 10 MG
10 SUPPOSITORY, RECTAL RECTAL DAILY PRN
Status: DISCONTINUED | OUTPATIENT
Start: 2022-02-11 | End: 2022-02-23 | Stop reason: HOSPADM

## 2022-02-11 RX ORDER — NITROGLYCERIN 0.4 MG/1
0.4 TABLET SUBLINGUAL EVERY 5 MIN PRN
Status: CANCELLED | OUTPATIENT
Start: 2022-02-11

## 2022-02-11 RX ORDER — SODIUM CHLORIDE 0.9 % (FLUSH) 0.9 %
5-40 SYRINGE (ML) INJECTION PRN
Status: DISCONTINUED | OUTPATIENT
Start: 2022-02-11 | End: 2022-02-11

## 2022-02-11 RX ORDER — ACETAMINOPHEN 325 MG/1
650 TABLET ORAL EVERY 4 HOURS PRN
Status: DISCONTINUED | OUTPATIENT
Start: 2022-02-11 | End: 2022-02-23 | Stop reason: HOSPADM

## 2022-02-11 RX ORDER — DIAZEPAM 5 MG/1
5 TABLET ORAL EVERY 6 HOURS PRN
Status: CANCELLED | OUTPATIENT
Start: 2022-02-11

## 2022-02-11 RX ADMIN — METOPROLOL TARTRATE 37.5 MG: 25 TABLET, FILM COATED ORAL at 22:23

## 2022-02-11 RX ADMIN — SENNOSIDES AND DOCUSATE SODIUM 1 TABLET: 50; 8.6 TABLET ORAL at 08:38

## 2022-02-11 RX ADMIN — CIPROFLOXACIN 500 MG: 500 TABLET, FILM COATED ORAL at 08:38

## 2022-02-11 RX ADMIN — CIPROFLOXACIN 500 MG: 500 TABLET, FILM COATED ORAL at 22:24

## 2022-02-11 RX ADMIN — ACETAMINOPHEN 650 MG: 325 TABLET ORAL at 18:19

## 2022-02-11 RX ADMIN — METHOTREXATE 22.5 MG: 2.5 TABLET ORAL at 08:38

## 2022-02-11 RX ADMIN — OXYCODONE 10 MG: 5 TABLET ORAL at 02:39

## 2022-02-11 RX ADMIN — CLOPIDOGREL BISULFATE 75 MG: 75 TABLET ORAL at 08:38

## 2022-02-11 RX ADMIN — POLYETHYLENE GLYCOL 3350 17 G: 17 POWDER, FOR SOLUTION ORAL at 08:43

## 2022-02-11 RX ADMIN — DIGOXIN 125 MCG: 125 TABLET ORAL at 08:38

## 2022-02-11 RX ADMIN — ACETAMINOPHEN 650 MG: 325 TABLET ORAL at 23:01

## 2022-02-11 RX ADMIN — PANTOPRAZOLE SODIUM 40 MG: 40 TABLET, DELAYED RELEASE ORAL at 06:54

## 2022-02-11 RX ADMIN — SODIUM CHLORIDE, PRESERVATIVE FREE 10 ML: 5 INJECTION INTRAVENOUS at 08:50

## 2022-02-11 RX ADMIN — BISACODYL 5 MG: 5 TABLET ORAL at 08:38

## 2022-02-11 RX ADMIN — NALOXEGOL OXALATE 12.5 MG: 12.5 TABLET, FILM COATED ORAL at 08:38

## 2022-02-11 RX ADMIN — ATORVASTATIN CALCIUM 10 MG: 10 TABLET, FILM COATED ORAL at 22:23

## 2022-02-11 RX ADMIN — FUROSEMIDE 20 MG: 20 TABLET ORAL at 08:39

## 2022-02-11 RX ADMIN — ACETAMINOPHEN 650 MG: 325 TABLET ORAL at 02:40

## 2022-02-11 ASSESSMENT — ENCOUNTER SYMPTOMS
SORE THROAT: 0
ABDOMINAL PAIN: 0
COUGH: 0
CONSTIPATION: 0
WHEEZING: 0
TROUBLE SWALLOWING: 0
SHORTNESS OF BREATH: 0
RHINORRHEA: 0
DIARRHEA: 0
BACK PAIN: 1
EYE PAIN: 0
VOMITING: 0
NAUSEA: 0
EYE DISCHARGE: 0

## 2022-02-11 ASSESSMENT — PAIN SCALES - GENERAL
PAINLEVEL_OUTOF10: 1
PAINLEVEL_OUTOF10: 6
PAINLEVEL_OUTOF10: 0
PAINLEVEL_OUTOF10: 8
PAINLEVEL_OUTOF10: 0
PAINLEVEL_OUTOF10: 4
PAINLEVEL_OUTOF10: 2

## 2022-02-11 NOTE — PROGRESS NOTES
[facility]  INPATIENT PHYSICAL THERAPY  DAILY NOTE  [facility] ORTHOPEDICS 7K - 7K-07/007-A    Time In: 3773  Time Out: 0830  Timed Code Treatment Minutes: 40 Minutes  Minutes: 40          Date: 2022  Patient Name: Quinton Ibrahim,  Gender:  female        MRN: [id number]  : 1945  (77 y.o.)     Referring Practitioner: Aster Reyes PA-C  Diagnosis: spinal stenosis  Additional Pertinent Hx: Per EMR \"Gail is a 77y/o female known to our office having been seen in the past several months with complaints of low back pain and leg pain. She has been through multiple epidural injections, most recently last month, with some mild-moderate relief of her pain. Over the past several days her pain had been worsening significantly to the point it was completely uncontrolled and her mobility had declined significantly. She called our office and the only recommendation was for her to present to the ED for evaluation and admission for further care. She does have a history of previous lumbar spine surgery roughly 20yrs ago. History of pacemaker, afib and is on plavix as well. She describes a sharp pain traveling from the low back into the left lateral/anterior thigh are. No new changes in bowel/bladder continence. \" L1-L4 Laminectomy with complication (durotomy) on 2/     Prior Level of Function:  Lives With: Alone  Type of Home: House  Home Layout: One level  Home Access: Stairs to enter without rails  Entrance Stairs - Number of Steps: 2 +1  Home Equipment: Rolling walker,Standard walker,Wheelchair-manual,Cane   Bathroom Shower/Tub: Tub/Shower unit  Bathroom Toilet: Handicap height  Bathroom Equipment:  (countertop on one side)  Bathroom Accessibility: Accessible    Receives Help From:  (friend helps with cleaning.)  ADL Assistance: [facility] [address]: Independent  Homemaking Responsibilities: Yes  Ambulation Assistance: Independent  Transfer Assistance: Independent  Active :  Yes  Additional Comments: Pt IND prior, family in the area to help as needed. Pt was using cane about 1-2 weeks prior to admission due to pain    Restrictions/Precautions:  Restrictions/Precautions: General Precautions,Fall Risk,Surgical Protocols  Required Braces or Orthoses  Other: Abdominal Binder  Position Activity Restriction  Spinal Precautions: No Bending,No Lifting,No Twisting  Other position/activity restrictions: L1-L4 Laminectomy with complication (durotomy) on 2/4, monitor for HA, return to supine if HA     SUBJECTIVE: RN approved session. Pt resting in bedside chair, pleasant and agreeable to therapy. PAIN: 3/10: L LE/surgical pain    Vitals: Vitals not assessed per clinical judgement, see nursing flowsheet    OBJECTIVE:  Bed Mobility:  Not Tested    Transfers:  Sit to Stand: [address], From bedside chair and from toilet. Required use of grab bar in restroom for increased independence. Stand to [place] [address], Pt sits quickly, cuing to reach back and control decent of transfer for decreased fall risk. Ambulation:  Contact Guard Assistance  Distance: 75 feet x1   Surface: Level Tile  Device:Rolling Walker  Gait Deviations:  Cues to correct forward flexed posture. Decreased B step lengths, decreased B heel strike, decreased adwoa. Balance:  Dynamic Sitting Balance: Stand By Assistance, Pt sitting at edge of recliner to complete there ex. Dynamic Standing Balance: Stand By Assistance, Pt standing in restroom to manage clothing, complete sarahi clean up, and wash hands at sink. One to no UE support required on AD for safety. Exercise:  Patient was guided in 1 set(s) 15 reps of exercise to both lower extremities. Seated marches, Seated hamstring curls, Seated heel/toe raises, Long arc quads and Seated abduction/adduction. Exercises were completed for increased independence with functional mobility.     Functional Outcome Measures: Completed  AM-PAC Inpatient Mobility Raw Score : 17  AM-PAC Inpatient T-Scale Score : 42.13    ASSESSMENT:  Assessment: Patient progressing toward established goals. and Pt tolerated session well. Limited by decreased strength, decreased endurance, decreased mobility. Would benefit from continued therapy at discharge. Activity Tolerance:  Patient tolerance of  treatment: good. Equipment Recommendations:Equipment Needed: No  Discharge Recommendations: Continue to assess pending progress and Inpatient Rehabilitation  Plan: Times per week: 6x O  Current Treatment Recommendations: Strengthening,Neuromuscular Re-education,Home Exercise Program,Safety Education & [place]scar [place] Training,Patient/Caregiver Education & Training,Functional Mobility Training,Equipment Evaluation, Education, & procurement,Transfer Training,Gait Training,Stair training    Patient Education  Patient Education: Plan of Care, Transfers, Gait    Goals:  Patient goals : \"feel better\"  Short term goals  Time Frame for Short term goals: by discharge  Short term goal 1: bed mobility with HOB flat, no rails, mod I for increased functional ind  Short term goal 2: Pt will demo IND with transfers with RW for support to progress with mobility. Short term goal 3: Pt will amb with RW for >100 feet with IND to progress towards PLOF. Short term goal 4: Pt will negotiate steps for PHYLLIS the home with S for safety to progress towards PLOF. Short term goal 5: Pt will demo S for car transfer with good sequencing to progress with mobility. Long term goals  Time Frame for Long term goals : NA due to short ELOS    Following session, patient left in safe position with all fall risk precautions in place.

## 2022-02-11 NOTE — PROGRESS NOTES
Per therapy patient c/o HA with therapy services and ambulating to/from BR. Therapy assisted patient to lying position in bed. Patient states pain 8/10. Administered pain medication per order. Patient stated HA was relieving with lying down. Resting in bed. Call light in reach.

## 2022-02-11 NOTE — PROGRESS NOTES
Hospitalist Progress Note    Patient:  Luisito Rasheed      Unit/Bed:7K-07/007-A    YOB: 1945    MRN: [id number]       Acct: [de-identified]     PCP: Chavo Felix MD    Date of Admission: 1/30/2022    Assessment/Plan:    1. UTI with Proteus hauseri and Pseudomonas aeruginosa, possibly secondary to Garcia catheter--Zosyn (S) 2/9-2/10; transitioned to Cipro 500 mg twice a day for 7 days as susceptible to both organisms  2. Essential hypertension, uncontrolled--stable, on Lasix, Lopressor; patient states her blood pressure normally runs on the lower side so we will hold her Lasix (already received her dose for today) and she has hold parameters on her Lopressor, monitor  3. Moderate malnutrition--per dietitian  4. CAD status post PCI, PPM, watchman--appreciate cardiology input; on statin, Plavix, Lanoxin, Lopressor  5. Rheumatoid arthritis--on Rheumatrex  6. Lumbar spinal stenosis with left lower extremity radiculopathy status post L1, L2, L3 and L4 laminectomies in the left side L2-L3 lumbar disc excision on 2/4/2022--per orthopedics    Expected discharge date: Per primary    Disposition:    [] Home       [] TCU       [x] Rehab       [] Psych       [] SNF       [] [place]       [] Other-    Chief Complaint: Following for UTI and high blood pressure    Hospital Course: Per consult note dated 1/30: \"Lulu E Frankey Bllane y.o. female who we are asked to see/evaluate by Adonay Hathaway MD for medical management of rheumatoid arthritis. Cardiology has been consulted on cardiology related matters.     Patient has several months history of low back pain that radiates to the left groin and left buttock with radiation down the left leg. The patient has had epidural injections at [facility] as late as 1/17/2022. The patient states this weekend her pain was very intense and she was referred to the ED for admission for surgery. \"    2/10--> awaiting urine culture on Pseudomonas, hemodynamically stable; laboratory studies normal on 2/7 except mildly anemic; call lab to check on Pseudomonas culture and should be resulting soon~both susceptible to Zosyn and Cipro so we will convert to Cipro as oral agent    2/11--> blood pressures are soft however patient states this is not new for her, will hold Lasix and has hold parameters on Lopressor     Subjective (past 24 hours): Denies a headache; eating well; offers no other complaints at this time; sitting up in the chair and states she feels well, tells me that sometimes her blood pressures on the lower side    Medications:  Reviewed    Infusion Medications    sodium chloride       Scheduled Medications    ciprofloxacin  500 mg Oral 2 times per day    sodium chloride flush  5-40 mL IntraVENous 2 times per day    polyethylene glycol  17 g Oral Daily    bisacodyl  5 mg Oral Daily    sennosides-docusate sodium  1 tablet Oral BID    naloxegol  12.5 mg Oral QAM    methotrexate  22.5 mg Oral Weekly    digoxin  125 mcg Oral Daily    furosemide  20 mg Oral Daily    magnesium oxide  250 mg Oral Daily    metoprolol tartrate  37.5 mg Oral BID    atorvastatin  10 mg Oral Nightly    pantoprazole  40 mg Oral QAM AC    clopidogrel  75 mg Oral QAM     PRN Meds: acetaminophen, magic (miracle) mouthwash, hydrALAZINE, oxyCODONE **OR** oxyCODONE, diazePAM, sodium chloride flush, sodium chloride, ondansetron **OR** ondansetron, magnesium hydroxide, bisacodyl, HYDROmorphone, HYDROmorphone, nitroGLYCERIN      Intake/Output Summary (Last 24 hours) at 2/11/2022 0704  Last data filed at 2/11/2022 0131  Gross per 24 hour   Intake 430 ml   Output 220 ml   Net 210 ml       Diet:  ADULT DIET; Regular  ADULT ORAL NUTRITION SUPPLEMENT; Breakfast, Lunch, Dinner;  Other Oral Supplement; activia and hot beverage at B,L and D  ADULT ORAL NUTRITION SUPPLEMENT; Breakfast, Lunch, Dinner; Standard 4 oz Oral Supplement    Exam:  BP (!) 91/53   Pulse 61   Temp 98.2 °F (36.8 °C) (Oral) Resp 18   Ht 5' 1\" (1.549 m)   Wt 115 lb (52.2 kg)   SpO2 97%   BMI 21.73 kg/m²     General appearance: No apparent distress, appears stated age and cooperative. HEENT: Pupils equal, round, and reactive to light. Conjunctivae/corneas clear. Neck: Supple, with full range of motion. No jugular venous distention. Trachea midline. Respiratory:  Normal respiratory effort. Clear to auscultation, bilaterally without Rales/Wheezes/Rhonchi. Cardiovascular: Regular rate and rhythm with normal S1/S2 without murmurs, rubs or gallops. Abdomen: Soft, non-tender, non-distended with normal bowel sounds. Musculoskeletal: passive and active ROM x 4 extremities. Skin: Skin color, texture, turgor normal.    Neurologic:  Neurovascularly intact without any focal sensory/motor deficits. Cranial nerves: II-XII intact, grossly non-focal.  Psychiatric: Alert and oriented, thought content appropriate  Capillary Refill: Brisk,< 3 seconds   Peripheral Pulses: +2 palpable, equal bilaterally     Microbiology:    Urine culture with Proteus and Pseudomonas    Urinalysis:      Lab Results   Component Value Date    NITRU POSITIVE 02/07/2022    WBCUA >200W/CLUMPS 02/07/2022    BACTERIA NONE SEEN 02/07/2022    RBCUA > 200 02/07/2022    BLOODU LARGE 02/07/2022    GLUCOSEU NEGATIVE 02/07/2022       Radiology:  ECHO Complete 2D W Doppler W Color    Summary  Normal left ventricle size and systolic function. Ejection fraction was estimated at 55 %. There were no regional left ventricular wall motion abnormalities and wall  thickness was within normal limits. The left atrium is Moderately dilated. Small secundum atrial septal defect with left-to-right shunt was  visualized with velocity of 1.5 m/sec. Mild to Moderately enlarged right atrium size. XR CHEST PORTABLE    Result Date: 1/30/2022  PROCEDURE: XR CHEST PORTABLE CLINICAL INFORMATION: Back pain COMPARISON: None TECHNIQUE: AP portable chest radiograph performed.  FINDINGS: Cardiac conduction device is seen with 2 leads. The leads are intact. Hazy opacities are seen in the right lung base. Cardiac silhouette is mildly enlarged. Aortic arch calcifications. No pleural effusion. No pneumothorax. No acute bony abnormality. Clips in the right upper quadrant may relate to prior cholecystectomy. Degenerative changes are seen in both shoulders. Old fracture deformity of the right ribs. 1. Nonspecific hazy opacities in the right lung base. Findings can relate to atelectasis or infiltrate. **This report has been created using voice recognition software. It may contain minor errors which are inherent in voice recognition technology. ** Final report electronically signed by Dr Yang Bravo on 1/30/2022 5:54 PM      DVT prophylaxis: [] Lovenox   *Per primary                                 [] SCDs                                 [] SQ Heparin                                 [] Encourage ambulation           [] Already on Anticoagulation     Code Status: Full Code    PT/OT Eval Status: on case    Tele:   [] yes             [x] no    Active Hospital Problems    Diagnosis Date Noted    Midline low back pain without sciatica [M54.50]     Moderate malnutrition ([address] [E44.0] 02/08/2022    Pre-op evaluation [Z01.818]     Abnormal EKG [R94.31]     Coronary artery disease involving native coronary artery of native heart without angina pectoris [I25.10]     Spinal stenosis [M48.00] 01/30/2022    Rheumatoid arthritis ([address] [M06.9] 07/16/2012       Electronically signed by MEGHAN Pop CNP on 2/11/2022 at 7:04 AM

## 2022-02-11 NOTE — PROGRESS NOTES
Admitted to the Inpatient Rehabilitation Unit via wheelchair. Patient was then oriented to room and unit. Education provided on the rehabilitation routine: three hours of therapy five days per week and dining room for lunch. Explained patients right to have family, representative or physician notified of their admission. Patient has Declined for physician to be notified. Patient has Declined for family/representative to be notified. Admitting medication orders compared with acute stay medications; home medication list reviewed with patient/family. Medication issues identified No  Medication issue: none  IBladder and Bowel Function Assessment:  1. Prior history of bladder problems: no problems with bladder  2. Number of pads used per day: 0  3. Frequency of night time voiding: twice  4. Fluid intake volume and pattern: Adequate  5. Last BM: 2/10/22  6. Bowel problems (prior or current) No      Incontinence      Frequent diarrhea      No BM in 3 days this stay or history of constipation      Hemorrhoids      Diverticulitis      Bowel Surgery     Two nurse skin assessment performed by Danay Almonte LPN  and [facility] . Weight: 118.5 lbs      Care plan was created with patient's input and goals were agreed upon. Admission folder provided with education regarding patients diagnoses, fall prevention, skin care, and M in the box. \"Data Collection Information Summary for Patients in Inpatient Rehabilitation Facilities\" and \"Privacy Act Statement - Health Care Records\" provided. Please refer to the admission navigator for further information.

## 2022-02-11 NOTE — PROGRESS NOTES
Department of Orthopedic Surgery  Spine Service  Progress Note        Subjective:    2/1/22  Ben Pearson is resting in the chair. Doing better with pain medication. Cardio consulted, planning stress test today. Holding Plavix with planned surgery Friday possibly pending cardio recs. 2/2/22  Gail is resting in bed. Doing well. Stress test completed yesterday. Await full cardio recommendation, showed no new ischemia. Likely to proceed with surgery Friday. 2/3/22  Gail is resting in the chair. Surgically cleared by Cardio. Planning surgical intervention tomorrow afternoon. NPO at midnight. 2/4/22 Surgery    2/5/22 POD 1  Gail is resting in bed on bedrest. N with small amounts of vomiting. Believes caused by Percocet. Will d/c and add Kerry panel since patient tolerated in past. Muscle spasms uncontrolled, d/c flexeril, add Valium. Denies HA. Drain output dark red. Relief of LLE radicular symptoms. Will slowly advance activity level with HOB at 30 degrees with HA precautions. 2/6/22 POD 2  Gail is resting in bed with HOB at 30 degrees. No HA. One drain with increased output of 240 and other at 0. Light red color. Pain better controlled with Kerry and Valium. N/V relieved. Continued relief of LLE radicular symptoms. Will restart plavix today. Continue bedrest, will dangle feet at bedside today with HA precautions    2/7/22 POD 3  Gail is resting in bed. Developed N yesterday with poor appetite, better this morning. Poor nutritional intake, will consult nutritionist. Dangled feet at bedside, denies HA. Drain output continues to be elevated with one having light red with approximately 360. Patient would like to sit in chair today. Ok to discontinue bedrest and sit patient in chair with HA precautions. Elevated BP, IM reconsulted, added IV hydralazine, if not improved will have Cardio for re-eval.     2/8/22 POD 4  Gail is resting in bed.  Developed a posterior HA last night when getting up from the chair which was relieved laying flat. Will return back to bedrest, hold PT. HOB no more than 30 degrees. IP Rehab consulted and patient is candidate when medically cleared and able to undergo 3 hours of PT/daily. +UTI, hematuria. On Ancef per IM    2/9/22 POD 5  Gail is resting in bed, feels better today. Denies any HA. Drain output decreasing, continued light red. +culture UTI Pseudomonas, IM managing abx. Will advance activity as tolerated, GARCIA precautions. [place] to work with therapy. Continued plan of IP Rehab    2/10/22 POD 6  Gail is resting in the chair doing well. Denies HA. Drain output in #1 elevated with no output in drain #2. Will remove drain #2 with dressing change. UTI, on IV Zosyn, IM managing. Appreciate recs  Planning IP Rehab, ok to transfer when medically cleared and ready to undergo 3 hrs of PT.     2/11/22 POD 7  Gail is resting in the chair about to begin working with therapy. Drain output trending down. Denies HA when ambulating. [place] to continue working with therapy and transfer to [facility]. Ok to transfer per orthospine standpoint, will continue to follow for drain management while in IP Rehab.      Vitals  VITALS:  BP (!) 91/53   Pulse 61   Temp 98.2 °F (36.8 °C) (Oral)   Resp 18   Ht 5' 1\" (1.549 m)   Wt 115 lb (52.2 kg)   SpO2 97%   BMI 21.73 kg/m²   24HR INTAKE/OUTPUT:      Intake/Output Summary (Last 24 hours) at 2/11/2022 2077  Last data filed at 2/11/2022 0131  Gross per 24 hour   Intake 430 ml   Output 220 ml   Net 210 ml     URINARY CATHETER OUTPUT (Garcia):     DRAIN/TUBE OUTPUT:  Closed/Suction Drain Left Back Accordion 10 Algerian-Output (ml): 110 ml  [REMOVED] Closed/Suction Drain Right Back [address] 10 Algerian-Output (ml): 0 ml      PHYSICAL EXAM:    Orientation:  alert and oriented to person, place and time    Lower Extremity Motor :  quadriceps, extensor hallucis longus, dorsiflexion, plantarflexion 5/5 bilaterally  Lower Extremity Sensory:  Intact L1-S1    Flatus: positive    ABNORMAL EXAM FINDINGS:  none    LABS:    HgB:    Lab Results   Component Value Date    HGB 10.2 02/07/2022         ASSESSMENT AND PLAN:    POD 7, L1-L4 lami with durotomy leak and repair  Lumbar spinal stenosis  Lumbar radiculopathy  Low back pain     1:  Monitor labs and drain output; 50%  2:  Cardio cleared, appreciate recs  3:  Take off bedrest, advance as tolerated with HA precautions. 4:  Pain better controlled, continue Kerry and Valium   5: Ok to discharge to IP Rehab per orthospine standpoint. Will continue to follow for drain output it discharged and readmitted to IP Rehab.   6: Nausea, resolved  7: Elevated BP, IM following, added IV hydralazine prn  8: Restarted Plavix 2/6/22  9: +UTI culture +pseudomonas, on IV Zosyn, IM following.

## 2022-02-11 NOTE — PROGRESS NOTES
Patient with faint headache after working with PT and OT this AM. Patient up in chair for over one hour after and reports no increase in headache. Overall, she states she is feeling good. Will plan for admission to [facility] today. She will have full day of therapy tomorrow, and then have a rest day on Sunday. Patient understanding.        Electronically signed by MEGHAN Larsen CNP on 2/11/2022 at 11:19 AM

## 2022-02-11 NOTE — CARE COORDINATION
2/11/22, 9:57 AM EST  Planning IP rehab later today after therapy. Patient goals/plan/ treatment preferences discussed by  and . Patient goals/plan/ treatment preferences reviewed with patient/ family. Patient/ family verbalize understanding of discharge plan and are in agreement with goal/plan/treatment preferences. Understanding was demonstrated using the teach back method. AVS provided by RN at time of discharge, which includes all necessary medical information pertaining to the patients current course of illness, treatment, post-discharge goals of care, and treatment preferences.         IMM Letter  IMM Letter given to Patient/Family/Significant other/Guardian/POA/by[de-identified]   IMM Letter date given[de-identified] 02/11/22  IMM Letter time given[de-identified] 0143

## 2022-02-11 NOTE — PROGRESS NOTES
[facility]  Occupational Therapy  Daily Note  Time:   Time In: 0900  Time Out: 0940  Timed Code Treatment Minutes: 40 Minutes  Minutes: 40          Date: 2022  Patient Name: Lena Chang,   Gender: female      Room: [facility]-A  MRN: [id number]  : 1945  (77 y.o.)  Referring Practitioner: JEROME Browning PA-C  Diagnosis: spinal stenosis  Additional Pertinent Hx: Per EMR \"Gail is a 75y/o female known to our office having been seen in the past several months with complaints of low back pain and leg pain. She has been through multiple epidural injections, most recently last month, with some mild-moderate relief of her pain. Over the past several days her pain had been worsening significantly to the point it was completely uncontrolled and her mobility had declined significantly. She called our office and the only recommendation was for her to present to the ED for evaluation and admission for further care. She does have a history of previous lumbar spine surgery roughly 20yrs ago. History of pacemaker, afib and is on plavix as well. She describes a sharp pain traveling from the low back into the left lateral/anterior thigh are. No new changes in bowel/bladder continence. \" L1-L4 Laminectomy with complication (durotomy) on     Restrictions/Precautions:  Restrictions/Precautions: General Precautions,Fall Risk,Surgical Protocols  Required Braces or Orthoses  Other: Abdominal Binder  Position Activity Restriction  Spinal Precautions: No Bending,No Lifting,No Twisting  Other position/activity restrictions: L1-L4 Laminectomy with complication (durotomy) on , monitor for HA, return to supine if HA     SUBJECTIVE: Pt sitting in recliner upon arrival, pt agreeable to OT session, RN gave verbal approval for session. Pt upset about current abdominal binder due to pt unable to don/doff herself due to her RA in her hands.  PETE spoke with RN and informed RN to contact [facility] for obtaining lumbar corset brace. PAIN: 0/10:     Vitals: Nurse checked vitals prior to session    COGNITION: WFL    ADL:   Grooming: Stand By Assistance. with pt sitting in recliner  Bathing: Moderate Assistance. with completing sarahi area due to pt unable to reach  Upper Extremity Dressing: Moderate Assistance. with donning abdominal binder  Lower Extremity Dressing: Minimal Assistance. for donning pants in the back due to abdominal binder location with pt able to don pants/brief with reacher. BALANCE:  Standing Balance: Contact Guard Assistance. with pt using RW, and 1 UE release from walker to complete ADL routine    BED MOBILITY:  Not Tested    TRANSFERS:  Sit to Stand:  Contact Guard Assistance. from recliner  Stand to Sit: [address] back to recliner     ASSESSMENT:     Activity Tolerance:  Patient tolerance of  treatment: good.        Discharge Recommendations: Inpatient Rehabilitation  Equipment Recommendations:    Plan: Times per week: 6x  Times per day: Daily  Current Treatment Recommendations: Strengthening,Endurance Training,Self-Care / ADL,Patient/Caregiver Education & Evangelist [employer] Management,Home Management Training,Functional Mobility Training,Safety Education & Training    Patient Education  Patient Education: Marco Isaac    Goals  Short term goals  Time Frame for Short term goals: by discharge  Short term goal 1: pt will complete BADL routine with S and using AE prn, while requiring 0 vc's to maitnain back precautions to increase indep with self care tasks  Short term goal 2: pt will tolerate dynamic standing  x 5 min with S to increase endurance for sinkside ADL routine  Short term goal 3: pt will complete functional mobility to/from bathroom with S using AD to access ADLs  Short term goal 4: pt will complete simulated homemaking tasks with SBA and no cues for back safety  Long term goals  Time Frame for Long term goals : not est due to est short LOS    Following session, patient left in safe position with all fall risk precautions in place.

## 2022-02-11 NOTE — PROGRESS NOTES
Resting in chair. Assisted with ambulation to BR. C/O aching at surgical site. Administered PRN pain medication per patient request pain at 8/10. Repositioned in bed. Denies further need. Call light in reach.

## 2022-02-11 NOTE — PROGRESS NOTES
Removed hemovac #2 per order. Back cleansed with hibaclens and new dressing applied to back. Hemovac #1 intact and drain patent. Up to BR with assistance. Ambulated to chair with walker and resting comfortably. Call light in reach. Chair alarm on.
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
## 2022-02-11 NOTE — H&P
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 can relate to atelectasis or infiltrate. Echocardiogram (1/30/2022) :  Conclusions Summary   Normal left ventricle size and systolic function. Ejection fraction was estimated at 55 %. There were no regional left ventricular wall motion abnormalities and wall   thickness was within normal limits. The left atrium is Moderately dilated. Small secundum atrial septal defect with left-to-right shunt was   visualized with velocity of 1.5 m/sec. Mild to Moderately enlarged right atrium size. Stress Test (2/1/2022) :  Conclusions Summary   Lexiscan EKG stress test is not suggestive for ischemia due to baseline   LBBB   This Nuclear Medicine study was negative for ischemia . The post admission physician evaluation (PATRICIA) is consistent with the pre-admission assessment. See above findings to reflect the elements required in the PATRICIA.   Patient's admitting condition is consistent with the findings of the preadmission assessment by the rehabilitation admissions coordinator.     Varun Osman MD

## 2022-02-12 LAB
ALBUMIN SERPL-MCNC: 2.3 G/DL (ref 3.5–5.1)
ALP BLD-CCNC: 87 U/L (ref 38–126)
ALT SERPL-CCNC: 7 U/L (ref 11–66)
ANION GAP SERPL CALCULATED.3IONS-SCNC: 9 MEQ/L (ref 8–16)
AST SERPL-CCNC: 18 U/L (ref 5–40)
BASOPHILS # BLD: 0.4 %
BASOPHILS ABSOLUTE: 0 THOU/MM3 (ref 0–0.1)
BILIRUB SERPL-MCNC: 0.3 MG/DL (ref 0.3–1.2)
BILIRUBIN DIRECT: < 0.2 MG/DL (ref 0–0.3)
BUN BLDV-MCNC: 8 MG/DL (ref 7–22)
CALCIUM SERPL-MCNC: 8.8 MG/DL (ref 8.5–10.5)
CHLORIDE BLD-SCNC: 106 MEQ/L (ref 98–111)
CO2: 26 MEQ/L (ref 23–33)
CREAT SERPL-MCNC: 0.3 MG/DL (ref 0.4–1.2)
EOSINOPHIL # BLD: 5.6 %
EOSINOPHILS ABSOLUTE: 0.4 THOU/MM3 (ref 0–0.4)
ERYTHROCYTE [DISTWIDTH] IN BLOOD BY AUTOMATED COUNT: 13.6 % (ref 11.5–14.5)
ERYTHROCYTE [DISTWIDTH] IN BLOOD BY AUTOMATED COUNT: 49.7 FL (ref 35–45)
GFR SERPL CREATININE-BSD FRML MDRD: > 90 ML/MIN/1.73M2
GLUCOSE BLD-MCNC: 88 MG/DL (ref 70–108)
HCT VFR BLD CALC: 31.8 % (ref 37–47)
HEMOGLOBIN: 9.9 GM/DL (ref 12–16)
IMMATURE GRANS (ABS): 0.05 THOU/MM3 (ref 0–0.07)
IMMATURE GRANULOCYTES: 0.7 %
LYMPHOCYTES # BLD: 31.5 %
LYMPHOCYTES ABSOLUTE: 2.3 THOU/MM3 (ref 1–4.8)
MCH RBC QN AUTO: 31.1 PG (ref 26–33)
MCHC RBC AUTO-ENTMCNC: 31.1 GM/DL (ref 32.2–35.5)
MCV RBC AUTO: 100 FL (ref 81–99)
MONOCYTES # BLD: 16.6 %
MONOCYTES ABSOLUTE: 1.2 THOU/MM3 (ref 0.4–1.3)
NUCLEATED RED BLOOD CELLS: 0 /100 WBC
PLATELET # BLD: 356 THOU/MM3 (ref 130–400)
PMV BLD AUTO: 10.7 FL (ref 9.4–12.4)
POTASSIUM REFLEX MAGNESIUM: 4 MEQ/L (ref 3.5–5.2)
PREALBUMIN: 8.1 MG/DL (ref 20–40)
RBC # BLD: 3.18 MILL/MM3 (ref 4.2–5.4)
SEG NEUTROPHILS: 45.2 %
SEGMENTED NEUTROPHILS ABSOLUTE COUNT: 3.3 THOU/MM3 (ref 1.8–7.7)
SODIUM BLD-SCNC: 141 MEQ/L (ref 135–145)
TOTAL PROTEIN: 5.1 G/DL (ref 6.1–8)
WBC # BLD: 7.2 THOU/MM3 (ref 4.8–10.8)

## 2022-02-12 PROCEDURE — 36415 COLL VENOUS BLD VENIPUNCTURE: CPT

## 2022-02-12 PROCEDURE — 84134 ASSAY OF PREALBUMIN: CPT

## 2022-02-12 PROCEDURE — 85025 COMPLETE CBC W/AUTO DIFF WBC: CPT

## 2022-02-12 PROCEDURE — 97530 THERAPEUTIC ACTIVITIES: CPT

## 2022-02-12 PROCEDURE — 97535 SELF CARE MNGMENT TRAINING: CPT

## 2022-02-12 PROCEDURE — 97110 THERAPEUTIC EXERCISES: CPT

## 2022-02-12 PROCEDURE — 6370000000 HC RX 637 (ALT 250 FOR IP): Performed by: NURSE PRACTITIONER

## 2022-02-12 PROCEDURE — 6370000000 HC RX 637 (ALT 250 FOR IP): Performed by: PHYSICAL MEDICINE & REHABILITATION

## 2022-02-12 PROCEDURE — 80053 COMPREHEN METABOLIC PANEL: CPT

## 2022-02-12 PROCEDURE — 97116 GAIT TRAINING THERAPY: CPT

## 2022-02-12 PROCEDURE — 97162 PT EVAL MOD COMPLEX 30 MIN: CPT

## 2022-02-12 PROCEDURE — 1180000000 HC REHAB R&B

## 2022-02-12 PROCEDURE — 97166 OT EVAL MOD COMPLEX 45 MIN: CPT

## 2022-02-12 RX ADMIN — ATORVASTATIN CALCIUM 10 MG: 10 TABLET, FILM COATED ORAL at 22:03

## 2022-02-12 RX ADMIN — Medication 250 MG: at 08:35

## 2022-02-12 RX ADMIN — OXYCODONE 10 MG: 5 TABLET ORAL at 17:54

## 2022-02-12 RX ADMIN — OXYCODONE 5 MG: 5 TABLET ORAL at 02:28

## 2022-02-12 RX ADMIN — SENNOSIDES AND DOCUSATE SODIUM 1 TABLET: 50; 8.6 TABLET ORAL at 08:36

## 2022-02-12 RX ADMIN — PANTOPRAZOLE SODIUM 40 MG: 40 TABLET, DELAYED RELEASE ORAL at 05:19

## 2022-02-12 RX ADMIN — METOPROLOL TARTRATE 37.5 MG: 25 TABLET, FILM COATED ORAL at 08:35

## 2022-02-12 RX ADMIN — METOPROLOL TARTRATE 37.5 MG: 25 TABLET, FILM COATED ORAL at 22:04

## 2022-02-12 RX ADMIN — DIGOXIN 125 MCG: 125 TABLET ORAL at 08:35

## 2022-02-12 RX ADMIN — CLOPIDOGREL BISULFATE 75 MG: 75 TABLET, FILM COATED ORAL at 08:35

## 2022-02-12 RX ADMIN — Medication 3 MG: at 22:04

## 2022-02-12 RX ADMIN — ACETAMINOPHEN 650 MG: 325 TABLET ORAL at 22:12

## 2022-02-12 RX ADMIN — OXYCODONE 5 MG: 5 TABLET ORAL at 13:31

## 2022-02-12 RX ADMIN — NALOXEGOL OXALATE 12.5 MG: 12.5 TABLET, FILM COATED ORAL at 08:35

## 2022-02-12 RX ADMIN — CIPROFLOXACIN 500 MG: 500 TABLET, FILM COATED ORAL at 22:03

## 2022-02-12 RX ADMIN — CIPROFLOXACIN 500 MG: 500 TABLET, FILM COATED ORAL at 08:35

## 2022-02-12 ASSESSMENT — PAIN SCALES - GENERAL
PAINLEVEL_OUTOF10: 0
PAINLEVEL_OUTOF10: 3
PAINLEVEL_OUTOF10: 0
PAINLEVEL_OUTOF10: 0
PAINLEVEL_OUTOF10: 6
PAINLEVEL_OUTOF10: 0
PAINLEVEL_OUTOF10: 7
PAINLEVEL_OUTOF10: 5
PAINLEVEL_OUTOF10: 5
PAINLEVEL_OUTOF10: 1
PAINLEVEL_OUTOF10: 3
PAINLEVEL_OUTOF10: 5
PAINLEVEL_OUTOF10: 0

## 2022-02-12 ASSESSMENT — PAIN DESCRIPTION - ORIENTATION
ORIENTATION: MID

## 2022-02-12 ASSESSMENT — PAIN DESCRIPTION - ONSET
ONSET: ON-GOING
ONSET: ON-GOING

## 2022-02-12 ASSESSMENT — PAIN DESCRIPTION - LOCATION
LOCATION: BACK

## 2022-02-12 ASSESSMENT — PAIN DESCRIPTION - PROGRESSION
CLINICAL_PROGRESSION: NOT CHANGED

## 2022-02-12 ASSESSMENT — PAIN DESCRIPTION - DESCRIPTORS
DESCRIPTORS: ACHING;DISCOMFORT;SORE
DESCRIPTORS: ACHING;DISCOMFORT;SORE

## 2022-02-12 ASSESSMENT — PAIN DESCRIPTION - PAIN TYPE
TYPE: ACUTE PAIN;SURGICAL PAIN

## 2022-02-12 ASSESSMENT — PAIN DESCRIPTION - FREQUENCY
FREQUENCY: INTERMITTENT
FREQUENCY: INTERMITTENT

## 2022-02-12 NOTE — PLAN OF CARE
Individualized Plan of [facility] Inpatient Rehabilitation Unit    Rehabilitation physician: Dr. Jojo Farooq Date: 2/11/2022     Rehabilitation Diagnosis: Cerebrospinal fluid leak due to lumbar surgery [G97.82, G96.00]      Rehabilitation impairments: self care, mobility, motor dysfunction, bowel/bladder management, pain management and safety    Factors facilitating achievement of predicted outcomes: Family support, Motivated, Cooperative and Pleasant  Barriers to the achievement of predicted outcomes: Pain, Limited insight into deficits, Decreased endurance, Decreased proprioception and Lower extremity weakness    Patient Goals: Improve independence with mobility, Improvement of mobility at a wheelchair level, Increase overall strength and endurance, Increase balance, Increase endurance, Increase independence with activities of daily living, Improve cognition, Increase self-awareness, Increase safety awareness, Increase community integration, Increase socialization, Functional communication with caregivers, Integrate appropriate pain management plan, Assure adequate nutritional option for discharge, Continence of bowel and bladder and Provide appropriate patient and family education      NURSING:  Nursing goals for Mammie Dry while on the rehabilitation unit will include:  Continence of bowel and bladder, Adequate number of bowel movements, Urinate with no urinary retention >300ml in bladder, Maintain O2 SATs at an acceptable level during stay, Effective pain management while on the rehabilitation unit, Establish adequate pain control plan for discharge, Absence of skin breakdown while on the rehabilitation unit, Improved skin integrity via assessments including wound measurements, Avoidance of any hospital acquired infections, No signs/symptoms of infection at the wound site, Freedom from injury during hospitalization and Complete education with patient/family with understanding demonstrated regarding disease process and resultant impairment     In order to achieve these goals, nursing interventions may include bowel/bladder training, education for medical assistive devices, medication education, O2 saturation management, energy conservation, stress management techniques, fall prevention, alarms protocol, seating and positioning, skin/wound care, pressure relief instruction, dressing changes, infection protection, DVT prophylaxis, assistance with safe transfers , and/or assistance with bathroom activities and hygiene. PHYSICAL THERAPY:  Goals:        Short term goals  Time Frame for Short term goals: 1 week  Short term goal 1: Patient will complete supine < > sit with SBA to transfer in/out of bed with decreased difficulty. Short term goal 2: Patient will complete sit < > stand with SBA with a RW to stand to ambulate with decreased difficulty. Short term goal 3: Patient will ambulate Deandre Viri 1560' with a RW and SBA to progress towards navigating home safely. Short term goal 4: Patient will ascend/descend 2, 6\" step with a RW with CGA to progress towards safe home entry. Short term goal 5: Patient will improve tinetti to greater than or equal to 19/28 to reduce her risk for falls. Long term goals  Time Frame for Long term goals : 2 weeks  Long term goal 1: Patient will complete supine  < > sit with modified independence to transfer in/out of bed safely. Long term goal 2: Patient will sit < > stand with modified independence to stand to ambulate safely. Long term goal 3: Patient will ambulate 80' with a RW and modified independence to navigate home safely. Long term goal 4: Patient will ascend/descend 3, 6\" steps with a RW and modified independence for safe home access. Long term goal 5: Patient will ascend/descend 2, 6\" steps with a hand rail and modified independence for garage entry.   Long term goal 6: Patient will complete car transfer with modified independence to transfer in/out of vehicle safely. Plan of Care: Patient to be seen by physical therapy services 90 minutes per day Monday through Friday and 30 minutes on Saturday or Sunday    Anticipated interventions may include therapeutic exercises, gait training, neuromuscular re-ed, transfer training, community reintegration, bed mobility, w/c mobility and training. OCCUPATIONAL THERAPY:  Goals:             Short term goals  Time Frame for Short term goals: by discharge  Short term goal 1: patient will tolerate 7 min functional standing with two hand release with (S) to increase activity tolerance for grooming and toileting. Short term goal 2: patient will demonstrate 4+/5 (B) UE strength and (F)  to increase UB strength for functional transfers and opening food containers. Short term goal 3: patient will increase activity tolerance to functionally ambulate house hold distances with (S) and 0-1 verbal cues for spinal precautions and obstacle negotiation. Short term goal 4: patient will be educated in (B) hand joint protection tech, and edu in (B) palmar stretching HEP to prevent further deformity and promote function. Short term goal 5: patient will complete toileting routine with (S) and 0-1 verbal cues for spinal precautions. Long term goals  Time Frame for Long term goals : 2 weeks  Long term goal 1: patient will complete tub transfer with (S). Long term goal 2: patient will complete light meal prep task with MOD I within spinal precautions. Long term goal 3: patient will complete grocery shopping task with MOD I using strategies to reach items within spinal precautions. Long term goal 4: patient will complete ADL routine with MOD I with use of AE PRN. Long term goal 5: patient will retrieve items from various heights with least restrictive device and AE PRN with MOD I to safely transition to prior living environment to complete IADLs.     Plan of Care: Patient to be seen by occupational therapy services 90 minutes per day Monday through Friday and 30 minutes on Saturday or Sunday    Anticipated interventions may include ADL and IADL retraining, strengthening, safety education and training, patient/caregiver education and training, equipment evaluation/ training/procurement, neuromuscular reeducation, wheelchair mobility training. SPEECH THERAPY:        No Speech Therapy Services required    CASE MANAGEMENT:  Goals:   Assist patient/family with discharge planning, patient/family counseling,  and coordination with insurance during the inpatient rehabilitation stay. Other members of the multidisciplinary rehabilitation team that will be involved in the patient's plan of care include recreational therapy, dietary, respiratory therapy, and neuropsychology. Medical issues being managed closely and that require 24 hour availability of a physician:  Bowel/Bladder function, Wound care, Pain management, Infection protection, DVT prophylaxis, Fall precautions, Fluid/Electrolyte balance, Nutritional status, Respiratory needs, Anemia and History of heart disease                                           Physician anticipated functional outcomes: Improved independence with functional measures   Estimated length of stay for this admission : probably 2~3 weeks  Medical Prognosis: Good  Anticipated disposition: Home. The potential to achieve the above medical and rehabilitative goals is good. This plan of care has been developed with the assistance and input of the multidisciplinary rehabilitation team.  The plan was reviewed with the patient. The patient has had the opportunity to provide input to the therapy team.    I have reviewed this Individualized Plan of Care and agree with its contents. Above documentation has been expanded, modified, adjusted to reflect the findings of my evaluations and goals for the patient.     Physician:  Malcolm Theodore MD

## 2022-02-12 NOTE — PROGRESS NOTES
[address] [address]  INPATIENT PHYSICAL THERAPY  EVALUATION  [address] [facility] - [id number]    Time In: 1000  Time Out: 1100  Timed Code Treatment Minutes: 44 Minutes  Minutes: 60          Date: 2022  Patient Name: Lena Chang,  Gender:  female        MRN: [id number]  : 1945  (77 y.o.)  Referral Date : 22   Referring Practitioner: LUANN Chavez (referring), Jennifer Thompson MD  Diagnosis: Spinal stenosis of lumbar region with radiculopathy  Additional Pertinent Hx: Per H&P: \"Gail Drummond  is a 77 y.o. right-handed  female with a history of hypertension, GERD, hyperlipidemia, thyroid nodule, atrial fibrillation requiring WATCHMAN procedure, rheumatoid arthritis with bilateral hand fingers deformity, COPD and emphysema, diverticulosis, coronary artery disease requiring coronary arteries angioplasty and stenting, congestive heart failure, anemia, status post pacemaker placement, status post bilateral total knee arthroplasties and bilateral total hip arthroplasty, status post multiple bilateral feet surgeries for deformity, status post lumbar spine surgeries in  and , status post appendectomy, hysterectomy, hemorrhoidectomy, is admitted to the inpatient rehabilitation unit on 2022 for intensive inpatient rehabilitation treatment of impaired ADLs and ambulation secondary to lumbar spinal stenosis with left lower extremity radiculopathy requiring lumbar laminectomy and disc excision on 2022 by Dr. William Lopez. \"  Pt transferred to inpatient rehab 22.      Restrictions/Precautions:  Restrictions/Precautions: General Precautions,Fall Risk,Surgical Protocols  Required Braces or Orthoses  Spinal: Lumbar Corset  Position Activity Restriction  Spinal Precautions: No Bending,No Lifting,No Twisting  Other position/activity restrictions: L1-L4 Laminectomy with complication (durotomy) on     Subjective:  Chart Reviewed: Yes  Patient assessed for rehabilitation services?: Yes  Family / Caregiver Present: No  Subjective: Patient in room in chair, requesting to use restroom. Pt cooperative and pleasant. Pt with significant bowel incontinence upon arrival, requiring assistance for clean up    General:  Overall Orientation Status: Within Functional Limits    Vision: Impaired  Vision Exceptions: Wears glasses for distance    Hearing: Exceptions to [facility]  Hearing Exceptions: Hard of hearing/hearing concerns         Pain: 1/10 low back    Vitals: Vitals not assessed per clinical judgement, see nursing flowsheet    Social/Functional History:    Lives With: Alone  Type of Home: House  Home Layout: One level  Home Access: Stairs to enter without rails  Entrance Stairs - Number of Steps: 2 steps to garage with left hand rail, 1 + 1 +1 step into home  Home Equipment: Rolling walker,Standard walker,Wheelchair-manual,Cane     Bathroom Shower/Tub: Tub/Shower unit  Bathroom Toilet: Handicap height  Bathroom Equipment: Grab bars in shower (reports she has access to a shower chair with a back)  Bathroom Accessibility: Accessible (patient states she will have difficulty getting a 2 w/w into the BR at home. will have to side step or furniture walk)    Receives Help From: Friend(s)  ADL Assistance: Independent  Homemaking Assistance: Independent  Homemaking Responsibilities: Yes  Ambulation Assistance: Independent  Transfer Assistance: Independent    Active : Yes  Occupation: Retired  Additional Comments: Pt IND prior, family in the area to help as needed.  Pt was using cane about 1-2 weeks prior to admission due to pain    OBJECTIVE:  Range of Motion:  Right Lower Extremity: WFL  Left Lower Extremity: [facility]    Strength:  Right Lower Extremity: Impaired -  Hip flexion 4-/5, knee 4/5, ankle 4/5  Left Lower Extremity: Impaired - hip flexion 3+/5, knee flexion 4-/5, knee extension 3+/5, ankle 4-/5    Balance:  Static Standing Balance: Contact Guard Assistance, some sway while standing to clean up after incontinent bowel movement  Dynamic Standing Balance: Contact Guard Assistance, Minimal Assistance    Bed Mobility:  Rolling to Left: Stand By Assistance   Rolling to Right: Stand By Assistance   Supine to Sit: Minimal Assistance  Sit to Supine: Stand By Assistance    *Head of bed flat, no rail, increased time to complete, log roll utilized    Transfers:  Sit to Stand: Contact Guard Assistance  Stand to [address] [address]  To/From [facility] and Chair: Facundo Llanes initially assessed with no AD as pt completed at [facility], RW used after initial assessment for stability and pain reduction    Ambulation: *Pt did not utilize RW at [facility] and therefore initial gait assessment completed with no AD. Contact Guard Assistance, Minimal Assistance  Distance: 10', 4'  Surface: Level Tile  Device:No Device  Gait Deviations: Forward Flexed Posture, Slow Tianna, Decreased Step Length Bilaterally, Decreased Weight Shift Left, Decreased Gait Speed, Decreased Heel Strike Bilaterally, Mild Path Deviations and Decreased Terminal Knee Extension    Contact Guard Assistance  Distance: 42', 55', 20', 8'  Surface: Level Tile  Device:Rolling Walker  Gait Deviations: Forward Flexed Posture, Slow Tianna, Decreased Step Length on Right, Decreased Weight Shift Left, Decreased Gait Speed, Decreased Heel Strike Bilaterally, Mild Path Deviations and Decreased Terminal Knee Extension  *Verbal cues for heel strike and posture    *Step not attempted due to weakness left LE and pt not safe to attempt without PT intervention of a RW    Functional Outcome Measures: Completed       ASSESSMENT:  Activity Tolerance:  Patient tolerance of  treatment: good. Treatment Initiated: Treatment and education initiated within context of evaluation.   Evaluation time included review of current medical information, gathering information related to past medical, social and functional history, completion of standardized testing, formal and informal observation of tasks, assessment of data and development of plan of care and goals. Treatment time included skilled education and facilitation of tasks to increase safety and independence with functional mobility for improved independence and quality of life. Therapeutic activity completed to improve patient's ability to complete functional mobility. Assessment: Body structures, Functions, Activity limitations: Decreased functional mobility ,Decreased endurance,Decreased strength,Decreased balance  Assessment: Patient is a 77year old s/p lumbar surgery, presenting with bilateral LE weakness, low back pain and decreased activity tolerance. Pt was independent with no AD at [facility], only intermittent use of SC prior to surgery and requiring use of RW to safely complete mobility at this time. Pt completes bed mobility with SBA/min assist and gait distance limited by fatigue. Patient would benefit from skilled PT services to improve her ability to complete functional mobility, reduce her risk for falls and allow patient to return to [facility].   Prognosis: Good   Co-morbidities: arthritis, CHF, COPD, RA       Discharge Recommendations:  Discharge Recommendations: Continue to assess pending progress,Patient would benefit from continued therapy after discharge    Patient Education:  PT Education: PT Rabia Richards of [address] [place] [address] Training    Equipment Recommendations:  Equipment Needed: No    Plan:  Times per week: 5x/wk 90min, 1x/wk 30min  Times per day: Daily  Plan weeks: 2  Current Treatment Recommendations: Lisa Benz Re-education,Patient/Caregiver Education & Training,Balance Training,Gait Training,Home Exercise Program,Functional Mobility Training,Stair training,Safety Education & Training    Goals:  Patient goals : Hadley Claude able to be self sufficient\"  Short term goals  Time Frame for Short term goals: 1 week  Short term goal 1: Patient will complete supine < > sit with SBA to transfer in/out of bed with decreased difficulty. Short term goal 2: Patient will complete sit < > stand with SBA with a RW to stand to ambulate with decreased difficulty. Short term goal 3: Patient will ambulate Deandre Viri 1560' with a RW and SBA to progress towards navigating home safely. Short term goal 4: Patient will ascend/descend 2, 6\" step with a RW with CGA to progress towards safe home entry. Short term goal 5: Patient will improve tinetti to greater than or equal to 19/28 to reduce her risk for falls. Long term goals  Time Frame for Long term goals : 2 weeks  Long term goal 1: Patient will complete supine  < > sit with modified independence to transfer in/out of bed safely. Long term goal 2: Patient will sit < > stand with modified independence to stand to ambulate safely. Long term goal 3: Patient will ambulate 80' with a RW and modified independence to navigate home safely. Long term goal 4: Patient will ascend/descend 3, 6\" steps with a RW and modified independence for safe home access. Long term goal 5: Patient will ascend/descend 2, 6\" steps with a hand rail and modified independence for garage entry. Long term goal 6: Patient will complete car transfer with modified independence to transfer in/out of vehicle safely. Following session, patient left in safe position with all fall risk precautions in place.
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
## 2022-02-12 NOTE — PROGRESS NOTES
[address]  EVALUATION    Time:   Time In: 1055  Time Out: 1225  Timed Code Treatment Minutes: 75 Minutes  Minutes: 90          Date: 2022  Patient Name: Rance Riedel,   Gender: female      MRN: [id number]  : 1945  (77 y.o.)  Referring Practitioner: LUANN Bennett  Diagnosis: spinal stenosis of lumbar region with radiculopathy  Additional Pertinent Hx: Per EMR \"Gail is a 77y/o female known to our office having been seen in the past several months with complaints of low back pain and leg pain. She has been through multiple epidural injections, most recently last month, with some mild-moderate relief of her pain. Over the past several days her pain had been worsening significantly to the point it was completely uncontrolled and her mobility had declined significantly. She called our office and the only recommendation was for her to present to the ED for evaluation and admission for further care. She does have a history of previous lumbar spine surgery roughly 20yrs ago. History of pacemaker, afib and is on plavix as well. She describes a sharp pain traveling from the low back into the left lateral/anterior thigh are. No new changes in bowel/bladder continence.  \" L1-L4 Laminectomy with complication (durotomy) on     Restrictions/Precautions:  Restrictions/Precautions: General Precautions,Fall Risk,Surgical Protocols  Required Braces or Orthoses  Spinal: Lumbar Corset  Position Activity Restriction  Spinal Precautions: No Bending,No Lifting,No Twisting  Other position/activity restrictions: L1-L4 Laminectomy with complication (durotomy) on     Subjective  Chart Reviewed: Yes,Orders,Progress Notes,History and Physical,Imaging,Operative Notes  Patient assessed for rehabilitation services?: Yes    Subjective: patient supine in bed upon OT arrrival and agreeable to eval. states she has had a lot of diarrhea and has already been bathed and changed her clothing. resistive to demonstrating ADL tasks with edu in purpose/benefits of OT. A & O x 3. able to recall spinal precautions. Pain:  Pain Assessment  Patient Currently in Pain: Yes  Pain Assessment: 0-10  Pain Level: 3 (describes as \"sore\")  Pain Type: Acute pain;Surgical pain  Pain Location: Back  Pain Orientation: Mid    Vitals: Vitals not assessed per clinical judgement, see nursing flowsheet    Social/Functional History:  Lives With: Alone  Type of Home: House  Home Layout: One level  Home Access: Stairs to enter without rails  Entrance Stairs - Number of Steps: 2 steps to garage with left hand rail, 1 + 1 +1 step into home  Home Equipment: Rolling walker,Standard walker,Wheelchair-manual,Cane   Bathroom Shower/Tub: Tub/Shower unit  Bathroom Toilet: Handicap height  Bathroom Equipment: Grab bars in shower (reports she has access to a shower chair with a back)  Bathroom Accessibility: Accessible (patient states she will have difficulty getting a 2 w/w into the BR at home. will have to side step or furniture walk)    Receives Help From: Friend(s)  ADL Assistance: Independent  Homemaking Assistance: Independent  Homemaking Responsibilities: Yes  Ambulation Assistance: Independent  Transfer Assistance: Independent    Active : Yes  Occupation: Retired  Additional Comments: Pt IND prior, family in the area to help as needed. Pt was using cane about 1-2 weeks prior to admission due to pain    VISION:WFL    HEARING:  WFL    COGNITION: Slow Processing and Decreased Insight    RANGE OF MOTION:  Right Upper Extremity: WFL  Left Upper Extremity:  WFL  Demonstrates severe RA in (B)hands with ulnar drift and swan neck deformities in all digits. STRENGTH:  Right Upper Extremity: shldr 4/5 elbow flex and ext 4/5  (F-)  Left Upper Extremity:  shldr 4/5 elbow flex and ext 4/5  (F-)    SENSATION:   WFL    ADL:     EATING:Setup or clean-up assistance.   has severe RA in (B) hands with ulnar drift and swan neck deformities in digits. Zachary Brittle CARE Score: 5. ORAL HYGIENE:Supervision or touching assistance. completed task in standing at sink with cues for 2 w/w placement for safety. increased time to manipulate cap to open toothpaste tube and squeeze onto toothbrush. Zachary Brittle CARE Score: 4. TOILETING HYGIENE:Partial/moderate assistance. SBA for hygiene for urine with some difficulty due to lumbar corset, MIN A for cleanliness for BM. CARE Score: 3. SHOWERING/BATHING:Partial/moderate assistance. reports she had assist in a.m. due to diarrhea to clean up prior to OT arrival.. CARE Score: 3.     UPPER BODY DRESSING:Setup or clean-up assistance. Zachary Brittle CARE Score: 5. LOWER BODY DRESSING:Supervision or touching assistance. cues for tech to comply spinal precautions. Zachary Brittle CARE Score: 4. FOOTWEAR:Supervision or touching assistance  able to cross legs to reach feet to complete task with some difficulty. Zachary Brittle CARE Score: 4. TOILET TRANSFER: Supervision or touching assistance. wtih use of 2 w/w and grab bars. raised toilet seat over the toilet that was too high for patient and was lowered for her height with patient stating she liked it better when lowered. Zachary Brittle CARE Score: 4. GROOMING: stood at sink and wash hands following toileting with patient remembering to place 2 w/w in proper place to complete task after first trial of standing at sink to complete oral care. Donned lumbar corset seated EOB with SBA and increased time to complete due to RA in (B) hands. BALANCE:  Sitting Balance:  Stand By Assistance. seated EOB  Standing Balance: Contact Guard Assistance.  with 2 w/w for support    BED MOBILITY:  Supine to Sit: Stand By Assistance with bedrail and good demo of log rolling tech    TRANSFERS:  Sit to Stand:  Contact Guard Assistance. good hand placement; no facial grimacing     FUNCTIONAL MOBILITY:  Assistive Device: Rolling Walker  Assist Level:  Contact Guard Assistance. Distance: To and from bathroom       Exercise:  completed 1 set x 15 reps with minimal resistive resistance band with rest breaks and cues for tech to hold onto band due to decreased  from RA to increase UB strength for functional transfers. ADDITIONAL ACTIVITIES:  Patient participated in 2 trials of functional standing with 2 w/w for support with SBA for balance and no LOB reaching out of SUBHA by taking a step to reach for items and demo good spinal alignment. First trial x 3 min with seated rest break and then second trial x 2 min with patient fatigued to increase activity tolerance for kitchen tasks. Completed 2 trials of functional mobility in room with 2 w/w and obstacles placed in patient's bathe to navigate and cues for tech to comply with spinal precautions due to occasionally twisting too much or for 2 w/w placement to side step thru narrow areas to retrieve items from various heights with no LOB and good tolerance. Seated rest break b/t trials. Functional ambulation task to increase ease with navigating household distances for IADL tasks. Activity Tolerance:  Patient tolerance of  treatment: good. No c/o fatigue, SOB or demo LOB during evaluation. Slight pain in back. Frustration with lumbar corset. Assessment:  Assessment: patient has spinal precautions and is to wear a lumbar brace impacting her ability to complete ADLs/IADLs and functional mobility / transfers as prior. patient demonstrates decreased activity tolerance to complete ADLs without assist and would benefit from continued, skilled OT to increase activity tolerance, UB strength, AE training, ease and (I) with ADLs and functional transfers to safely transition to prior living environment, decrease caregiver burden and increase occupational performance.   Performance deficits / Impairments: Decreased functional mobility ,Decreased ADL status,Decreased endurance,Decreased strength,Decreased balance  Prognosis: Good  Decision Making: Medium Complexity    Treatment Initiated: Treatment and education initiated within context of evaluation. Evaluation time included review of current medical information, gathering information related to past medical, social and functional history, completion of standardized testing, formal and informal observation of tasks, assessment of data and development of plan of care and goals. Treatment time included skilled education and facilitation of tasks to increase safety and independence with ADL's for improved functional independence and quality of life. Discharge Recommendations:  Continue to assess pending progress,Patient would benefit from continued therapy after discharge    Patient Education:  OT Education: OT Role,Plan of Care,Home Exercise Program,Precautions,ADL Adaptive Strategies,Transfer [address]  Patient Education: alternative tech for LB dressing to comply with spinal precautions. Equipment Recommendations:  Equipment Needed: Yes  Mobility Devices: ADL Assistive Devices  Other: monitor for DME/AE needs. patient would be interested in 2 w/w tray if discharged with a 2 w/w for functional mobility. Plan:  Times per week: 5 x / wk for 90 minutes, 1 x / wk for 30 minutes  Times per day: Daily  Current Treatment Recommendations: Vincent Newberry Re-education,Patient/Caregiver Education & Training,Self-Care / ADL,Equipment Evaluation, Education, & procurement,Safety Education & Training. See long-term goal time frame for expected duration of plan of care. If no long-term goals established, a short length of stay is anticipated. Goals:  Patient goals : [facility]  Short term goals  Time Frame for Short term goals: by discharge  Short term goal 1: patient will tolerate 7 min functional standing with two hand release with (S) to increase activity tolerance for grooming and toileting.   Short term goal 2: patient will demonstrate 4+/5 (B) UE strength and (F)  to increase UB strength for functional transfers and opening food containers. Short term goal 3: patient will increase activity tolerance to functionally ambulate house hold distances with (S) and 0-1 verbal cues for spinal precautions and obstacle negotiation. Short term goal 4: patient will be educated in (B) hand joint protection tech, and edu in (B) palmar stretching HEP to prevent further deformity and promote function. Short term goal 5: patient will complete toileting routine with (S) and 0-1 verbal cues for spinal precautions. Long term goals  Time Frame for Long term goals : 2 weeks  Long term goal 1: patient will complete tub transfer with (S). Long term goal 2: patient will complete light meal prep task with MOD I within spinal precautions. Long term goal 3: patient will complete grocery shopping task with MOD I using strategies to reach items within spinal precautions. Long term goal 4: patient will complete ADL routine with MOD I with use of AE PRN. Long term goal 5: patient will retrieve items from various heights with least restrictive device and AE PRN with MOD I to safely transition to prior living environment to complete IADLs. Following session, patient left in safe position with all fall risk precautions in place.

## 2022-02-12 NOTE — PROGRESS NOTES
[address] [address]  INPATIENT PHYSICAL THERAPY  DAILY NOTE  3 Formerly [facility], later [facility]    Time In: 1330  Time Out: 1400  Timed Code Treatment Minutes: 30 Minutes  Minutes: 30          Date: 2022  Patient Name: Joel Nichols,  Gender:  female        MRN: [id number]  : 1945  (77 y.o.)  Referral Date : 22  Referring Practitioner: LUANN Lloyd (referring), Glynda Fothergill, MD  Diagnosis: Spinal stenosis of lumbar region with radiculopathy  Additional Pertinent Hx: Per H&P: \"Gail Alegria  is a 77 y.o. right-handed  female with a history of hypertension, GERD, hyperlipidemia, thyroid nodule, atrial fibrillation requiring WATCHMAN procedure, rheumatoid arthritis with bilateral hand fingers deformity, COPD and emphysema, diverticulosis, coronary artery disease requiring coronary arteries angioplasty and stenting, congestive heart failure, anemia, status post pacemaker placement, status post bilateral total knee arthroplasties and bilateral total hip arthroplasty, status post multiple bilateral feet surgeries for deformity, status post lumbar spine surgeries in  and , status post appendectomy, hysterectomy, hemorrhoidectomy, is admitted to the inpatient rehabilitation unit on 2022 for intensive inpatient rehabilitation treatment of impaired ADLs and ambulation secondary to lumbar spinal stenosis with left lower extremity radiculopathy requiring lumbar laminectomy and disc excision on 2022 by Dr. Saurabh Pena. \"  Pt transferred to inpatient rehab 22.      Prior Level of Function:  Lives With: Alone  Type of Home: House  Home Layout: One level  Home Access: Stairs to enter without rails  Entrance Stairs - Number of Steps: 2 steps to garage with left hand rail, 1 + 1 +1 step into home  Home Equipment: Rolling walker,Standard walker,Wheelchair-manual,Cane   Bathroom Shower/Tub: Tub/Shower unit  Bathroom Toilet: Handicap height  Bathroom Equipment: Grab bars in shower (reports she has access to a shower chair with a back)  Bathroom Accessibility: Accessible (patient states she will have difficulty getting a 2 w/w into the BR at home. will have to side step or furniture walk)    Receives Help From: Friend(s)  ADL Assistance: [address]: Independent  Homemaking Responsibilities: Yes  Ambulation Assistance: Independent  Transfer Assistance: Independent  Active : Yes  Additional Comments: Pt IND prior, family in the area to help as needed. Pt was using cane about 1-2 weeks prior to admission due to pain    Restrictions/Precautions:  Restrictions/Precautions: General Precautions,Fall Risk,Surgical Protocols  Required Braces or Orthoses  Spinal: Lumbar Corset  Position Activity Restriction  Spinal Precautions: No Bending,No Lifting,No Twisting  Other position/activity restrictions: L1-L4 Laminectomy with complication (durotomy) on 2/4     SUBJECTIVE: RN approved session. Pt resting in bedside chair, RN in giving pain meds. PAIN: 5/10: back pain    Vitals: Vitals not assessed per clinical judgement, see nursing flowsheet    OBJECTIVE:  Bed Mobility:  Sit to Supine: Moderate Assistance, Of LEs. Pt using bed rail and required cuing for log roll technique. Transfers:  Sit to Stand: [employer], cues for hand placement  Stand to [address] [address], cues for hand placement    Ambulation:  Contact Guard Assistance  Distance: 55 feet x1   Surface: Level Tile  Device:Rolling Walker  Gait Deviations:  Slow adwoa. Decreased B step lengths. Decreased B heel strike. Cuing for improved upright posture. Balance:  Dynamic Sitting Balance: Stand By Assistance, Pt sat EOM to complete there ex. Exercise:  Patient was guided in 1 set(s) 10 reps of exercise to both lower extremities.   Seated marches, Seated hamstring curls, Seated heel/toe raises, Long arc quads, Seated abduction/adduction and Scapular retraction. Exercises were completed for increased independence with functional mobility. Functional Outcome Measures: Not completed       ASSESSMENT:  Assessment: Patient progressing toward established goals. and Pt tolerated session fairly well. Limited by pain, decreased strength, decreased endurance. [facility] Activity Tolerance:  Patient tolerance of  treatment: good. Equipment Recommendations:Equipment Needed: No  Discharge Recommendations: Continue to assess pending progress  Plan: Times per week: 5x/wk 90min, 1x/wk 30min  Times per day: Daily  Plan weeks: 2  Current Treatment Recommendations: Nicole Cotton Re-education,Patient/Caregiver Education & Training,Balance Training,Gait Training,Home Exercise Program,Functional Mobility Training,Stair training,Safety Education & Training    Patient Education  Patient Education: [employer], Transfers, Gait    Goals:  Patient goals : \"be able to be self sufficient\"  Short term goals  Time Frame for Short term goals: 1 week  Short term goal 1: Patient will complete supine < > sit with SBA to transfer in/out of bed with decreased difficulty. Short term goal 2: Patient will complete sit < > stand with SBA with a RW to stand to ambulate with decreased difficulty. Short term goal 3: Patient will ambulate Deandre Viri 1560' with a RW and SBA to progress towards navigating home safely. Short term goal 4: Patient will ascend/descend 2, 6\" step with a RW with CGA to progress towards safe home entry. Short term goal 5: Patient will improve tinetti to greater than or equal to 19/28 to reduce her risk for falls. Long term goals  Time Frame for Long term goals : 2 weeks  Long term goal 1: Patient will complete supine  < > sit with modified independence to transfer in/out of bed safely.   Long term goal 2: Patient will sit < > stand with modified independence to stand to ambulate safely. Long term goal 3: Patient will ambulate 80' with a RW and modified independence to navigate home safely. Long term goal 4: Patient will ascend/descend 3, 6\" steps with a RW and modified independence for safe home access. Long term goal 5: Patient will ascend/descend 2, 6\" steps with a hand rail and modified independence for garage entry. Long term goal 6: Patient will complete car transfer with modified independence to transfer in/out of vehicle safely. Following session, patient left in safe position with all fall risk precautions in place.

## 2022-02-13 PROCEDURE — 6370000000 HC RX 637 (ALT 250 FOR IP): Performed by: NURSE PRACTITIONER

## 2022-02-13 PROCEDURE — 1180000000 HC REHAB R&B

## 2022-02-13 PROCEDURE — 6370000000 HC RX 637 (ALT 250 FOR IP): Performed by: PHYSICAL MEDICINE & REHABILITATION

## 2022-02-13 RX ADMIN — DIGOXIN 125 MCG: 125 TABLET ORAL at 09:32

## 2022-02-13 RX ADMIN — SENNOSIDES AND DOCUSATE SODIUM 1 TABLET: 50; 8.6 TABLET ORAL at 09:32

## 2022-02-13 RX ADMIN — CLOPIDOGREL BISULFATE 75 MG: 75 TABLET, FILM COATED ORAL at 09:32

## 2022-02-13 RX ADMIN — ACETAMINOPHEN 650 MG: 325 TABLET ORAL at 05:40

## 2022-02-13 RX ADMIN — SENNOSIDES AND DOCUSATE SODIUM 1 TABLET: 50; 8.6 TABLET ORAL at 22:26

## 2022-02-13 RX ADMIN — ATORVASTATIN CALCIUM 10 MG: 10 TABLET, FILM COATED ORAL at 22:27

## 2022-02-13 RX ADMIN — ACETAMINOPHEN 650 MG: 325 TABLET ORAL at 12:48

## 2022-02-13 RX ADMIN — NALOXEGOL OXALATE 12.5 MG: 12.5 TABLET, FILM COATED ORAL at 09:32

## 2022-02-13 RX ADMIN — Medication 250 MG: at 14:13

## 2022-02-13 RX ADMIN — PANTOPRAZOLE SODIUM 40 MG: 40 TABLET, DELAYED RELEASE ORAL at 05:40

## 2022-02-13 RX ADMIN — Medication 3 MG: at 22:25

## 2022-02-13 RX ADMIN — OXYCODONE 5 MG: 5 TABLET ORAL at 14:14

## 2022-02-13 RX ADMIN — OXYCODONE 10 MG: 5 TABLET ORAL at 18:25

## 2022-02-13 RX ADMIN — CIPROFLOXACIN 500 MG: 500 TABLET, FILM COATED ORAL at 22:27

## 2022-02-13 RX ADMIN — CIPROFLOXACIN 500 MG: 500 TABLET, FILM COATED ORAL at 09:32

## 2022-02-13 ASSESSMENT — PAIN - FUNCTIONAL ASSESSMENT: PAIN_FUNCTIONAL_ASSESSMENT: PREVENTS OR INTERFERES SOME ACTIVE ACTIVITIES AND ADLS

## 2022-02-13 ASSESSMENT — PAIN SCALES - GENERAL
PAINLEVEL_OUTOF10: 5
PAINLEVEL_OUTOF10: 5
PAINLEVEL_OUTOF10: 7
PAINLEVEL_OUTOF10: 0
PAINLEVEL_OUTOF10: 0
PAINLEVEL_OUTOF10: 1
PAINLEVEL_OUTOF10: 0
PAINLEVEL_OUTOF10: 0
PAINLEVEL_OUTOF10: 5
PAINLEVEL_OUTOF10: 3
PAINLEVEL_OUTOF10: 3
PAINLEVEL_OUTOF10: 4

## 2022-02-13 ASSESSMENT — PAIN DESCRIPTION - PAIN TYPE: TYPE: ACUTE PAIN;SURGICAL PAIN;CHRONIC PAIN

## 2022-02-13 ASSESSMENT — PAIN DESCRIPTION - DESCRIPTORS: DESCRIPTORS: ACHING;DISCOMFORT;SORE

## 2022-02-13 ASSESSMENT — PAIN DESCRIPTION - FREQUENCY: FREQUENCY: INTERMITTENT

## 2022-02-13 ASSESSMENT — PAIN DESCRIPTION - ORIENTATION
ORIENTATION: MID
ORIENTATION: MID

## 2022-02-13 ASSESSMENT — PAIN DESCRIPTION - LOCATION
LOCATION: BACK
LOCATION: BACK

## 2022-02-13 ASSESSMENT — PAIN SCALES - WONG BAKER: WONGBAKER_NUMERICALRESPONSE: 0

## 2022-02-13 ASSESSMENT — PAIN DESCRIPTION - ONSET: ONSET: ON-GOING

## 2022-02-13 ASSESSMENT — PAIN DESCRIPTION - PROGRESSION: CLINICAL_PROGRESSION: NOT CHANGED

## 2022-02-13 NOTE — PROGRESS NOTES
Paula Lange is resting in the chair doing well. Walking well. No HA when up. Drains increased to 185 over last 24hr. Will continue to monitor. Cont. PT/OT.

## 2022-02-14 PROBLEM — E43 SEVERE MALNUTRITION (HCC): Status: ACTIVE | Noted: 2022-02-14

## 2022-02-14 PROCEDURE — 97110 THERAPEUTIC EXERCISES: CPT

## 2022-02-14 PROCEDURE — 97535 SELF CARE MNGMENT TRAINING: CPT

## 2022-02-14 PROCEDURE — 1180000000 HC REHAB R&B

## 2022-02-14 PROCEDURE — 6370000000 HC RX 637 (ALT 250 FOR IP): Performed by: NURSE PRACTITIONER

## 2022-02-14 PROCEDURE — 97116 GAIT TRAINING THERAPY: CPT

## 2022-02-14 PROCEDURE — 99231 SBSQ HOSP IP/OBS SF/LOW 25: CPT | Performed by: NURSE PRACTITIONER

## 2022-02-14 PROCEDURE — 97530 THERAPEUTIC ACTIVITIES: CPT

## 2022-02-14 PROCEDURE — 6370000000 HC RX 637 (ALT 250 FOR IP): Performed by: PHYSICAL MEDICINE & REHABILITATION

## 2022-02-14 RX ADMIN — SENNOSIDES AND DOCUSATE SODIUM 1 TABLET: 50; 8.6 TABLET ORAL at 21:47

## 2022-02-14 RX ADMIN — OXYCODONE 5 MG: 5 TABLET ORAL at 21:45

## 2022-02-14 RX ADMIN — OXYCODONE 5 MG: 5 TABLET ORAL at 14:51

## 2022-02-14 RX ADMIN — Medication 3 MG: at 21:47

## 2022-02-14 RX ADMIN — PANTOPRAZOLE SODIUM 40 MG: 40 TABLET, DELAYED RELEASE ORAL at 06:16

## 2022-02-14 RX ADMIN — CIPROFLOXACIN 500 MG: 500 TABLET, FILM COATED ORAL at 21:47

## 2022-02-14 RX ADMIN — ATORVASTATIN CALCIUM 10 MG: 10 TABLET, FILM COATED ORAL at 21:47

## 2022-02-14 RX ADMIN — METOPROLOL TARTRATE 37.5 MG: 25 TABLET, FILM COATED ORAL at 21:45

## 2022-02-14 RX ADMIN — CIPROFLOXACIN 500 MG: 500 TABLET, FILM COATED ORAL at 08:25

## 2022-02-14 RX ADMIN — CLOPIDOGREL BISULFATE 75 MG: 75 TABLET, FILM COATED ORAL at 08:25

## 2022-02-14 RX ADMIN — DIGOXIN 125 MCG: 125 TABLET ORAL at 08:26

## 2022-02-14 RX ADMIN — METOPROLOL TARTRATE 37.5 MG: 25 TABLET, FILM COATED ORAL at 08:25

## 2022-02-14 RX ADMIN — SENNOSIDES AND DOCUSATE SODIUM 1 TABLET: 50; 8.6 TABLET ORAL at 08:26

## 2022-02-14 ASSESSMENT — PAIN DESCRIPTION - PROGRESSION

## 2022-02-14 ASSESSMENT — PAIN SCALES - GENERAL
PAINLEVEL_OUTOF10: 0
PAINLEVEL_OUTOF10: 0
PAINLEVEL_OUTOF10: 4
PAINLEVEL_OUTOF10: 3

## 2022-02-14 NOTE — PROGRESS NOTES
[address] [address]  INPATIENT PHYSICAL THERAPY  DAILY NOTE  3 Formerly [facility]    Time In: 1000  Time Out: 1030  Timed Code Treatment Minutes: 30 Minutes  Minutes: 30          Date: 2022  Patient Name: Rance Riedel,  Gender:  female        MRN: [id number]  : 1945  (77 y.o.)  Referral Date : 22  Referring Practitioner: LUANN Davison (referring), Rosie Stinson MD  Diagnosis: Spinal stenosis of lumbar region with radiculopathy  Additional Pertinent Hx: Per H&P: \"Gail Wyman  is a 77 y.o. right-handed  female with a history of hypertension, GERD, hyperlipidemia, thyroid nodule, atrial fibrillation requiring WATCHMAN procedure, rheumatoid arthritis with bilateral hand fingers deformity, COPD and emphysema, diverticulosis, coronary artery disease requiring coronary arteries angioplasty and stenting, congestive heart failure, anemia, status post pacemaker placement, status post bilateral total knee arthroplasties and bilateral total hip arthroplasty, status post multiple bilateral feet surgeries for deformity, status post lumbar spine surgeries in  and , status post appendectomy, hysterectomy, hemorrhoidectomy, is admitted to the inpatient rehabilitation unit on 2022 for intensive inpatient rehabilitation treatment of impaired ADLs and ambulation secondary to lumbar spinal stenosis with left lower extremity radiculopathy requiring lumbar laminectomy and disc excision on 2022 by Dr. Jason Tong. \"  Pt transferred to inpatient rehab 22.      Prior Level of Function:  Lives With: Alone  Type of Home: House  Home Layout: One level  Home Access: Stairs to enter without rails  Entrance Stairs - Number of Steps: 2 steps to garage with left hand rail, 1 + 1 +1 step into home  Home Equipment: Rolling walker,Standard walker,Wheelchair-manual,Cane   Bathroom Shower/Tub: Tub/Shower unit  Bathroom Toilet: Handicap height  Bathroom Equipment: Grab bars in shower (reports she has access to a shower chair with a back)  Bathroom Accessibility: Accessible (patient states she will have difficulty getting a 2 w/w into the BR at home. will have to side step or furniture walk)    Receives Help From: Friend(s)  ADL Assistance: [address]: Independent  Homemaking Responsibilities: Yes  Ambulation Assistance: Independent  Transfer Assistance: Independent  Active : Yes  Additional Comments: Pt IND prior, family in the area to help as needed. Pt was using cane about 1-2 weeks prior to admission due to pain    Restrictions/Precautions:  Restrictions/Precautions: General Precautions,Fall Risk,Surgical Protocols  Required Braces or Orthoses  Spinal: Lumbar Corset  Position Activity Restriction  Spinal Precautions: No Bending,No Lifting,No Twisting  Other position/activity restrictions: L1-L4 Laminectomy with complication (durotomy) on 2/4     SUBJECTIVE: Patient seated in recliner upon arrival. Patient is pleasant and agreeable to therapy. Patient requesting to use bathroom at beginning of session. Reviewed patients spinal precautions at beginning of session. PAIN: 0/10    Vitals: Vitals not assessed per clinical judgement, see nursing flowsheet    OBJECTIVE:  Bed Mobility:  Not Tested    Transfers:  Sit to Stand: Contact Guard Assistance  Stand to [address] [address]   -Patient performed sit to stand transfers from recliner, wheelchair and toilet. Patient required verbal cueing for hand placement from toilet. Ambulation:  Contact Guard Assistance  Distance: 10 feet and 75 feet  Surface: Level Tile  Device:Rolling Walker  Gait Deviations:  Slow [place], Decreased Step Length Bilaterally, Decreased Gait Speed and Decreased Heel Strike Bilaterally  -Patient instructed in weaving in and out of cones with RW with CGA. Patient required verbal cueing for maintaining close proximity with AD during turning.  Patient demonstrated slight unsteadiness with turning. Patient instructed in weaving in and out of cones in order to assist with obstacle negotiation. Balance:  Dynamic Standing Balance: Contact Guard Assistance   -Patient stood in bathroom to assist with donning and doffing pants. Patient  Required verbal cueing for maintaining spinal precautions due to attempting to flex forward to reach for pants. Education provided on importance of maintaining precautions. Functional Outcome Measures: Not completed       ASSESSMENT:  Assessment: Patient progressing toward established goals. Activity Tolerance:  Patient tolerance of  treatment: good. Equipment Recommendations:Equipment Needed: No  Discharge Recommendations: Continue to assess pending progress and Patient would benefit from continued PT at discharge  Plan: Times per week: 5x/wk 90min, 1x/wk 30min  Times per day: Daily  Plan weeks: 2  Current Treatment Recommendations: Jarred Ortega Re-education,Patient/Caregiver Education & Training,Balance Training,Gait Training,Home Exercise Program,Functional Mobility Training,Stair training,Safety Education & Training    Patient Education  Patient Education: Precautions/Restrictions, Transfers, Gait,  - Patient Verbalized Understanding, - Patient Requires Continued Education    Goals:  Patient goals : \"be able to be self sufficient\"  Short term goals  Time Frame for Short term goals: 1 week  Short term goal 1: Patient will complete supine < > sit with SBA to transfer in/out of bed with decreased difficulty. Short term goal 2: Patient will complete sit < > stand with SBA with a RW to stand to ambulate with decreased difficulty. Short term goal 3: Patient will ambulate Deandre Viri 1560' with a RW and SBA to progress towards navigating home safely. Short term goal 4: Patient will ascend/descend 2, 6\" step with a RW with CGA to progress towards safe home entry.   Short term goal 5: Patient will improve tinetti to greater than or equal to 19/28 to reduce her risk for falls. Long term goals  Time Frame for Long term goals : 2 weeks  Long term goal 1: Patient will complete supine  < > sit with modified independence to transfer in/out of bed safely. Long term goal 2: Patient will sit < > stand with modified independence to stand to ambulate safely. Long term goal 3: Patient will ambulate 80' with a RW and modified independence to navigate home safely. Long term goal 4: Patient will ascend/descend 3, 6\" steps with a RW and modified independence for safe home access. Long term goal 5: Patient will ascend/descend 2, 6\" steps with a hand rail and modified independence for garage entry. Long term goal 6: Patient will complete car transfer with modified independence to transfer in/out of vehicle safely. Following session, patient left in safe position with all fall risk precautions in place. Tumor Debulked?: curette

## 2022-02-14 NOTE — PROGRESS NOTES
[facility]      Date:  2/14/2022            Patient Name: Monika Hutton           MRN: [id number]  [place]ille: [de-identified]          YOB: 1945 (70 y.o.)       Gender: female   Diagnosis: spinal stenosis of lumbar region with radiculopathy  Physician: Referring Practitioner: LUANN Yin    REASON FOR MISSED TREATMENT:  pt sound asleep in bed and did not wake her for our [facility], [address]     2/14/2022
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
## 2022-02-14 NOTE — PROGRESS NOTES
[address]  Occupational Therapy  Daily Note  Time:   Time In: 1350  Time Out: 1420  Timed Code Treatment Minutes: 30 Minutes  Minutes: 30          Date: 2022  Patient Name: Monika Hutton,   Gender: female      Room: [facility]/200-A  MRN: [id number]  : 1945  (77 y.o.)  Referring Practitioner: LUANN Yin  Diagnosis: spinal stenosis of lumbar region with radiculopathy  Additional Pertinent Hx: Per EMR \"Gail is a 75y/o female known to our office having been seen in the past several months with complaints of low back pain and leg pain. She has been through multiple epidural injections, most recently last month, with some mild-moderate relief of her pain. Over the past several days her pain had been worsening significantly to the point it was completely uncontrolled and her mobility had declined significantly. She called our office and the only recommendation was for her to present to the ED for evaluation and admission for further care. She does have a history of previous lumbar spine surgery roughly 20yrs ago. History of pacemaker, afib and is on plavix as well. She describes a sharp pain traveling from the low back into the left lateral/anterior thigh are. No new changes in bowel/bladder continence. \" L1-L4 Laminectomy with complication (durotomy) on     Restrictions/Precautions:  Restrictions/Precautions: General Precautions,Fall Risk,Surgical Protocols  Required Braces or Orthoses  Spinal: Lumbar Corset  Position Activity Restriction  Spinal Precautions: No Bending,No Lifting,No Twisting  Other position/activity restrictions: L1-L4 Laminectomy with complication (durotomy) on      SUBJECTIVE: patient side lying in bed upon OT arrival and agreeable to tx. PAIN: 2/10: low/mid back    Vitals: Vitals not assessed per clinical judgement, see nursing flowsheet    COGNITION: WFL    ADL:   Grooming: Stand By Assistance.   wash hands standing at sink with good placement of 2 w/w  Lower Extremity Dressing: Stand By Assistance. for R sock seated in recliner with increased time with patient crossing leg to reach foot with patient's leg frequently sliding off supporting leg. MIN A to adjust L sock after increased time and patient fatigued from trying. Toileting: Stand By Assistance. for clothing mgmt and toilet hygiene for urine. patient asked this writer to wipe bottom due to not wanting to twist and did not have a BM. Toilet Transfer: Stand By Assistance. with use of 2 w/w and grab bars. Donned lumbar corset seated EOB with SBA. BALANCE:  Sitting Balance:  Independent. .  Standing Balance: Stand By Assistance. with use of 2 w/w for support    BED MOBILITY:  Supine to Sit: Stand By Assistance increased time and cues for tech for spinal precautions    TRANSFERS:  Sit to Stand:  Stand By Assistance. cues to push from EOB instead of pulling on 2 w/w    FUNCTIONAL MOBILITY:  Assistive Device: Rolling Walker  Assist Level:  Stand By Assistance. Distance: To and from bathroom  No c/o fatigue or pain. ADDITIONAL ACTIVITIES:  Participated in Bablic dyn standing activity with one-two hand release and cues to not twist due to reaching cross midline for object out of SUBHA with SBA x 5 min, 7 sec to increase activity tolerance for IADLs. Patient wanted to sit in recliner at end of treatment session and removed her brace with SBA. ASSESSMENT: patient progressing well toward established goals     Activity Tolerance:  Patient tolerance of  treatment: good. Little to no c/o back pain, de-conditioned, aware of spinal precautions, no LOB or SOB during tx session. Discharge Recommendations: Continue to assess pending progress, Home with assist as needed and Patient would benefit from continued OT at discharge  Equipment Recommendations: Equipment Needed: Yes  Mobility Devices: ADL Assistive Devices  Other: monitor for DME/AE needs. patient would be interested in 2 w/w tray if discharged with a 2 w/w for functional mobility. Plan: Times per week: 5 x / wk for 90 minutes, 1 x / wk for 30 minutes  Times per day: Daily  Current Treatment Recommendations: Ligia Hyde Re-education,Patient/Caregiver Education & Training,Self-Care / ADL,Equipment Evaluation, Education, & procurement,Safety Education & Training    Patient Education  Patient Education: Role of OT, Plan of Care, ADL's, Precautions, Reviewed Prior Education and Importance of Increasing Activity    Goals  Short term goals  Time Frame for Short term goals: by discharge  Short term goal 1: patient will tolerate 7 min functional standing with two hand release with (S) to increase activity tolerance for grooming and toileting. Short term goal 2: patient will demonstrate 4+/5 (B) UE strength and (F)  to increase UB strength for functional transfers and opening food containers. Short term goal 3: patient will increase activity tolerance to functionally ambulate house hold distances with (S) and 0-1 verbal cues for spinal precautions and obstacle negotiation. Short term goal 4: patient will be educated in (B) hand joint protection tech, and edu in (B) palmar stretching HEP to prevent further deformity and promote function. Short term goal 5: patient will complete toileting routine with (S) and 0-1 verbal cues for spinal precautions. Long term goals  Time Frame for Long term goals : 2 weeks  Long term goal 1: patient will complete tub transfer with (S). Long term goal 2: patient will complete light meal prep task with MOD I within spinal precautions. Long term goal 3: patient will complete grocery shopping task with MOD I using strategies to reach items within spinal precautions. Long term goal 4: patient will complete ADL routine with MOD I with use of AE PRN.   Long term goal 5: patient will retrieve items from various heights with least restrictive device and AE PRN with MOD I to safely transition to prior living environment to complete IADLs. Following session, patient left in safe position with all fall risk precautions in place.

## 2022-02-14 NOTE — PROGRESS NOTES
her appetite has been \"getting better\". Reports she asks for smaller portions at meals d/t \"they send male size portions\". States she \"eats most of it\" when they send smaller portions. Patient reports liking the Ensure supplements being sent along with the activa yogurt. Treatments/Miscellaneous: PT/OT following. Drain to left back with 40 ml OP over last 24 hrs. Plan for drain to be removed today. GI Status: Last BM 2/12 per documentation. Pertinent Labs: Cr 0.3, hgb 9.9 (2/12)  Pertinent Meds: lipitor, dulcolax, plavix, lanoxin, mag-ox, rhematrex, lopressor, protonix, glycolax, senokot; PRN dulcolax, valum, magic mouth wash, oxycodone    Wounds:  Surgical Incision (back lower medial)       Current Nutrition Therapies:    ADULT DIET; Regular  ADULT ORAL NUTRITION SUPPLEMENT; Breakfast, Lunch, Dinner; Standard 4 oz Oral Supplement  ADULT ORAL NUTRITION SUPPLEMENT; Breakfast, Lunch, Dinner;  Other Oral Supplement; activia and hot beverage at B,L and D    Anthropometric Measures:  · Height: 5' 2.01\" (157.5 cm)  · Current Body Weight: 118 lb 9.7 oz (53.8 kg) (2/14- 53.8 kg)   · Admission Body Weight: 118 lb 9.7 oz (53.8 kg) (2/11- no method specified; no edema present 2/11)     · Ideal Body Weight: 110 lbs; % Ideal Body Weight 107.8 %  · BMI: 21.7   · BMI Categories: Underweight (BMI less than 22) age over 72       Nutrition Diagnosis:   · Severe malnutrition related to inadequate protein-energy intake as evidenced by moderate muscle loss,moderate loss of subcutaneous fat,severe muscle loss (weight loss of -7.3 lbs (5.8%) x 2 weeks)      Nutrition Interventions:   Food and/or Nutrient Delivery:  Continue Current Diet,Continue Oral Nutrition Supplement  Nutrition Education/Counseling:  Education initiated (encouraged PO oral intakes with meals/snacks; encouraged ensure supplement intake as able)   Coordination of Nutrition Care:       Goals:  Patient to meet 75% or more at meals daily throughout LOS       Nutrition Monitoring and Evaluation:   Behavioral-Environmental Outcomes:  None Identified   Food/Nutrient Intake Outcomes:  Food and Nutrient Intake  Physical Signs/Symptoms Outcomes:  Chewing or Swallowing,Biochemical Data,GI Status,Fluid Status or Edema,Weight,Skin,Nutrition Focused Physical Findings     Discharge Planning:     Too soon to determine     Electronically signed by Ron Jacome RD on 2/14/22 at 2:31 PM EST    Contact: [phone]

## 2022-02-14 NOTE — PROGRESS NOTES
[address] [facility]  Individualized Disclosure Statement      Patient: Monika Gorman of [facility] provides 24 hour individualized service to patients with functional limitations due to, but not limited to: stroke, brain injury, spinal cord injury, major multiple trauma, fractures, amputation, and neurological disorders. The [address] [place] [address] provides rehabilitative nursing and medical services as well as physical, occupational, speech, and recreation therapies. [address] [facility] is fully accredited by the Commission on Accreditation of Rehabilitation Facilities (CARF) as a comprehensive provider of rehabilitation services. Patients admitted to the [address] [place] [address] receive a minimum of three hours of therapy per day, at least six days per week, with a revised therapy schedule on weekends and holidays. Physical therapy, occupational therapy, and speech therapy are provided seven days per week including holidays. Other therapeutic services are available on weekends and evenings as needed or scheduled. Intensity of Treatment  Your treatment program will consist of Nursing Care and:  1.5 hours of Physical Therapy, per day  1.5 hours of Occupational Therapy, per day   30-60 minutes of Speech Therapy, per day  1 hour of Recreational Therapy, per week    [facility] maintains contracts with most insurance plans. Depending on the type of coverage, the insurance may impose limits on the coverage for rehabilitation care. Coverage is based on the premise that you are able to fully participate in the rehabilitation program and show continued progress. Please verify your own insurance information A copy of this was given to the patient/ family on this date.   Insurance Coverage  Your insurance company has made the following determination relative to the length of your stay:   Your estimated length of stay is 14 days   Your insurance Coverage has been verified as follows:    Primary Insurance: Medicare   Deductible: [id number] Coverage: Active  Secondary Insurance: BCBS; secondary insurance policies often cover co-pay amounts, but to ensure payment please contact your insurance company.     Alternative Resources: Please ask the  for more information [phone]

## 2022-02-14 NOTE — PROGRESS NOTES
little to no c/o pain in back, de-conditioned. Discharge Recommendations: Continue to assess pending progress and Patient would benefit from continued OT at discharge  Equipment Recommendations: Equipment Needed: Yes  Mobility Devices: ADL Assistive Devices  Other: monitor for DME/AE needs. patient would be interested in 2 w/w tray if discharged with a 2 w/w for functional mobility. Plan: Times per week: 5 x / wk for 90 minutes, 1 x / wk for 30 minutes  Times per day: Daily  Current Treatment Recommendations: Mal Mini Re-education,Patient/Caregiver Education & Training,Self-Care / ADL,Equipment Evaluation, Education, & procurement,Safety Education & Training    Patient Education  Patient Education: Role of OT, Plan of Care, ADL's, Home Exercise Program, Precautions, Reviewed Prior Education, Importance of Increasing Activity and joint protection tech, splint options    Goals  Short term goals  Time Frame for Short term goals: by discharge  Short term goal 1: patient will tolerate 7 min functional standing with two hand release with (S) to increase activity tolerance for grooming and toileting. Short term goal 2: patient will demonstrate 4+/5 (B) UE strength and (F)  to increase UB strength for functional transfers and opening food containers. Short term goal 3: patient will increase activity tolerance to functionally ambulate house hold distances with (S) and 0-1 verbal cues for spinal precautions and obstacle negotiation. Short term goal 4: patient will be educated in (B) hand joint protection tech, and edu in (B) palmar stretching HEP to prevent further deformity and promote function. Short term goal 5: patient will complete toileting routine with (S) and 0-1 verbal cues for spinal precautions. Long term goals  Time Frame for Long term goals : 2 weeks  Long term goal 1: patient will complete tub transfer with (S).   Long term goal 2: patient will complete light meal prep task with MOD I within spinal precautions. Long term goal 3: patient will complete grocery shopping task with MOD I using strategies to reach items within spinal precautions. Long term goal 4: patient will complete ADL routine with MOD I with use of AE PRN. Long term goal 5: patient will retrieve items from various heights with least restrictive device and AE PRN with MOD I to safely transition to prior living environment to complete IADLs. Following session, patient left in safe position with all fall risk precautions in place.

## 2022-02-14 NOTE — PROGRESS NOTES
[address] [address]  INPATIENT PHYSICAL THERAPY  DAILY NOTE  [address] [facility],4Th Floor - 7[address]-59/059-[address]    Time In: 0830  Time Out: 0930  Timed Code Treatment Minutes: 61 Minutes  Minutes: 60          Date: 2022  Patient Name: Joel Nichols,  Gender:  female        MRN: [id number]  : 1945  (77 y.o.)  Referral Date : 22  Referring Practitioner: LUANN Lloyd (referring), Glynda Fothergill, MD  Diagnosis: Spinal stenosis of lumbar region with radiculopathy  Additional Pertinent Hx: Per H&P: \"Gail Alegria  is a 77 y.o. right-handed  female with a history of hypertension, GERD, hyperlipidemia, thyroid nodule, atrial fibrillation requiring WATCHMAN procedure, rheumatoid arthritis with bilateral hand fingers deformity, COPD and emphysema, diverticulosis, coronary artery disease requiring coronary arteries angioplasty and stenting, congestive heart failure, anemia, status post pacemaker placement, status post bilateral total knee arthroplasties and bilateral total hip arthroplasty, status post multiple bilateral feet surgeries for deformity, status post lumbar spine surgeries in  and , status post appendectomy, hysterectomy, hemorrhoidectomy, is admitted to the inpatient rehabilitation unit on 2022 for intensive inpatient rehabilitation treatment of impaired ADLs and ambulation secondary to lumbar spinal stenosis with left lower extremity radiculopathy requiring lumbar laminectomy and disc excision on 2022 by Dr. Saurabh Pena. \"  Pt transferred to inpatient rehab 22.      Prior Level of Function:  Lives With: Alone  Type of Home: House  Home Layout: One level  Home Access: Stairs to enter without rails  Entrance Stairs - Number of Steps: 2 steps to garage with left hand rail, 1 + 1 +1 step into home  Home Equipment: Rolling walker,Standard walker,Wheelchair-manual,Cane   Bathroom Shower/Tub: Tub/Shower unit  Bathroom Toilet: Handicap height  Bathroom Equipment: Grab bars in shower (reports she has access to a shower chair with a back)  Bathroom Accessibility: Accessible (patient states she will have difficulty getting a 2 w/w into the BR at home. will have to side step or furniture walk)    Receives Help From: Friend(s)  ADL Assistance: [address]: Independent  Homemaking Responsibilities: Yes  Ambulation Assistance: Independent  Transfer Assistance: Independent  Active : Yes  Additional Comments: Pt IND prior, family in the area to help as needed. Pt was using cane about 1-2 weeks prior to admission due to pain    Restrictions/Precautions:  Restrictions/Precautions: General Precautions,Fall Risk,Surgical Protocols  Required Braces or Orthoses  Spinal: Lumbar Corset  Position Activity Restriction  Spinal Precautions: No Bending,No Lifting,No Twisting  Other position/activity restrictions: L1-L4 Laminectomy with complication (durotomy) on 2/4     SUBJECTIVE: Patient in bedside chair upon arrival and agreeable to therapy. Patient requested to use restroom during session. PAIN: not rated    Vitals: Vitals not assessed per clinical judgement, see nursing flowsheet    OBJECTIVE:  Bed Mobility:  Not Tested    Transfers:  Sit to Stand: Contact Guard Assistance  Stand to Sit:Contact Guard Assistance    Ambulation:  Contact Guard Assistance, w/c follow  Distance: level: ~12ft, 60ft, 75ft, 65ft    Ramp: 10ft  Surface: Level Tile and Ramp  Device:Rolling Walker  Gait Deviations:   Forward Flexed Posture, Decreased Step Length Bilaterally, Decreased Gait Speed, Decreased Heel Strike Bilaterally and Mild Path Deviations    Stairs:  Contact Guard Assistance  Number of Steps: 1 (platform step)  Height: 4\" step with Parallel Bars   Stairs:  Contact Guard Assistance  Number of Steps: 1 (forward/lateral step ups leading R/L x 5 reps each)  Height: 4\" step with Parallel Bars    Balance:  Dynamic Standing Balance: Contact Guard Assistance    Exercise:  Patient was guided in 1 set(s) 15 reps of exercise to both lower extremities. Seated marches, Seated hamstring curls, Seated heel/toe raises, Long arc quads, Seated isometric hip adduction, Standing heel/toe raises, Standing marches and Standing hip flexion. Exercises were completed for increased independence with functional mobility. Functional Outcome Measures: Not completed       ASSESSMENT:  Assessment: Patient progressing toward established goals. Activity Tolerance:  Patient tolerance of  treatment: good. Equipment Recommendations:Equipment Needed: No  Discharge Recommendations: Continue to assess pending progress and Patient would benefit from continued PT at discharge  Plan: Times per week: 5x/wk 90min, 1x/wk 30min  Times per day: Daily  Plan weeks: 2  Current Treatment Recommendations: Esther Gerardo Re-education,Patient/Caregiver Education & Training,Balance Training,Gait Training,Home Exercise Program,Functional Mobility Training,Stair training,Safety Education & Training    Patient Education  Patient Education: Plan of Care, Precautions/Restrictions, Transfers, Gait, Stairs, Up in Chair for Vanda Walsh, Verbal Exercise Instruction    Goals:  Patient goals : \"be able to be self sufficient\"  Short term goals  Time Frame for Short term goals: 1 week  Short term goal 1: Patient will complete supine < > sit with SBA to transfer in/out of bed with decreased difficulty. Short term goal 2: Patient will complete sit < > stand with SBA with a RW to stand to ambulate with decreased difficulty. Short term goal 3: Patient will ambulate Deandre  1560' with a RW and SBA to progress towards navigating home safely. Short term goal 4: Patient will ascend/descend 2, 6\" step with a RW with CGA to progress towards safe home entry. Short term goal 5: Patient will improve tinetti to greater than or equal to 19/28 to reduce her risk for falls.   Long term goals  Time Frame for Long term goals : 2 weeks  Long term goal 1: Patient will complete supine  < > sit with modified independence to transfer in/out of bed safely. Long term goal 2: Patient will sit < > stand with modified independence to stand to ambulate safely. Long term goal 3: Patient will ambulate 80' with a RW and modified independence to navigate home safely. Long term goal 4: Patient will ascend/descend 3, 6\" steps with a RW and modified independence for safe home access. Long term goal 5: Patient will ascend/descend 2, 6\" steps with a hand rail and modified independence for garage entry. Long term goal 6: Patient will complete car transfer with modified independence to transfer in/out of vehicle safely. Following session, patient left in safe position with all fall risk precautions in place.

## 2022-02-14 NOTE — PLAN OF CARE
Care plan reviewed with patient and verbalize understanding of the plan of care and contribute to goal setting. Problem: Falls - Risk of:  Goal: Will remain free from falls  Description: Will remain free from falls  Outcome: Ongoing  Goal: Absence of physical injury  Description: Absence of physical injury  Outcome: Ongoing    Appropriate use of call light. Pt ambulates with gait belt and walker. Pt has steady gait. Problem: Infection - Surgical Site:  Goal: Will show no infection signs and symptoms  Description: Will show no infection signs and symptoms  Outcome: Ongoing    Pt skin assessed this shift. Incision on posterior mid back skin intact. No drainage noted. No s/sx of infection. Skin around incision a little irritated. Problem: Pain:  Goal: Control of acute pain  Description: Control of acute pain  Outcome: Ongoing  Goal: Control of chronic pain  Description: Control of chronic pain  Outcome: Ongoing    Pt taking oxycodone PRN for surgical pain on lower back.

## 2022-02-14 NOTE — PROGRESS NOTES
[address] [address]  Recreational Therapy  Inpatient Rehabilitation Evaluation        Time Spent with Patient: 30 minutes    Date:  2/14/2022       Patient Name: Gina Acosta      MRN: [id number]       YOB: 1945 (70 y.o.)       Gender: female  Diagnosis: spinal stenosis of lumbar region with radiculopathy  Referring Practitioner: LUANN Lim    RESTRICTIONS/PRECAUTIONS:  Restrictions/Precautions: General Precautions,Fall Risk,Surgical Protocols  Vision: Impaired  Hearing: Exceptions to [facility]  Hearing Exceptions: Hard of hearing/hearing concerns    PAIN: 1    SUBJECTIVE:  Pt lives alone-she has 2 sons, 5 grandchildren and 3 great grands with 1 on the way    VISION:  Visual Impairment -macular degeneration-only wears glasses to drive or for distance     HEARING: Hard of Hearing    LEISURE INTERESTS:   Pt states she likes to do jigsaw puzzles and sometimes does them on her phone, she likes to crosstitch, converse with friends on phone, enjoys sending cards to friends and family, gets her groceries, enjoys going to her grandchildren's school activities, plays skipbo and word search puzzles-she use to enjoy western square dancing -pt very pleasant and social-has severe RA in hands     BARRIERS TO LEISURE INTERESTS:    Decreased endurance, Impaired vision and Pain           Patient Education  New Education Provided: Importance of Leisure, RT Plan of Care    Plan:  Continue to follow patient through this admission  See patient individually    Electronically signed by: TANISHA Carver  Date: 2/14/2022

## 2022-02-14 NOTE — PLAN OF CARE
Problem: Nutrition  Goal: Optimal nutrition therapy  Outcome: Ongoing     Nutrition Problem #1: Severe malnutrition  Intervention: Food and/or Nutrient Delivery: Continue Current Diet,Continue Oral Nutrition Supplement  Nutritional Goals: Patient to meet 75% or more at meals daily throughout LOS    See full note filed 2/14 at 2:40PM.

## 2022-02-14 NOTE — PROGRESS NOTES
[facility] NOTE    Conference Date: 2022  Admit Date:  2022  3:36 PM  Patient Name: Marie Barnhart    MRN: [id number]    : 1945  (77 y.o.)  Rehabilitation Admitting Diagnosis:  Cerebrospinal fluid leak due to lumbar surgery [G97.82, G96.00]  Referring Practitioner: LUANN Gonzales (referring), Cristine Balbuena MD      CASE MANAGEMENT  Current issues/needs regarding patient and family discharge status: Refer to SW assessment completed on Monday, 2022. SW to follow and maintain involvement in discharge planning. PHYSICAL THERAPY  Patient is making good gains despite her short length of stay on inpatient rehab. Patient progressed supine < > sit from SBA/min assist to SBA/CGA, sit < > stand from CGA to SBA, gait from CGA to SBA with ambulation and was able to ascend/descend 3 platform steps with a RW with SBA. Patient does require verbal cues to safely complete functional mobility and scored a 23/28 on the tinetti indicating moderate risk for falls. Patient would benefit from continued skilled PT services to continue to improve her ability to complete functional mobility, reduce her risk for falls and allow patient to return to PLOF safely. Equipment Needed: No    SPEECH THERAPY       OCCUPATIONAL THERAPY  Pt. Has demonstrated active engagement with OT sessions. Pt. Presents with but not limited to the following functional impairments: reduced carryover of spinal precautions, reduced ability to complete ADLs/IADLs at PLOF d/t current spinal precautions, reduced balance, reduced functional mobility during ADLs, reduced functional transfer performance and overall reduced safety all of which can directly impact pt's overall quality of life.   Pt. would benefit from further training in regards to use of LHAE to abide by spinal precautions, modifications/adpatiations to daily tasks to promote safety/performance with current status with spinal precautions,  Activity tolerance building interventions, etc.  Without continued OT services pt is at risk of falls, further decline in functioning and increased dependency on others all of which can impact pt's ability to return to PLOF safely. Equipment Needed: Yes  Mobility Devices: ADL Assistive Devices  Other: patient would be interested in 2 w/w tray if discharged with a 2 w/w for functional mobility. Pt. Reports she does have a shower chair with a back available if needed. May benefit from long handled reacher, dressing stick and long handled sponge. RECREATIONAL THERAPY  Patient has been offered participation in recreational therapy activities and participates as able. Pt states she likes to do jigsaw puzzles and sometimes does them on her phone, she likes to crosstitch, converse with friends on phone, enjoys sending cards to friends and family, gets her groceries, enjoys going to her grandchildren's school activities, plays skipbo and word search puzzles-she use to enjoy western square dancing -pt very pleasant and social-has severe RA in hands  -pt sound asleep in bed and did not wake her for our [address] [facility]  Weight: 118 lb 11.2 oz (53.8 kg) / Body mass index is 21.7 kg/m². Current diet: ADULT DIET; Regular  ADULT ORAL NUTRITION SUPPLEMENT; Breakfast, Lunch, Dinner; Standard 4 oz Oral Supplement  ADULT ORAL NUTRITION SUPPLEMENT; Breakfast, Lunch, Dinner; Other Oral Supplement; activia and hot beverage at B,L and D  Please see nutrition note for details. NURSING  Continent of Bowel: Yes. Frequency: every 2-3 days. Management: Duocolax po; Glycolax; Senokot; PRN MOM, dulcolax suppository and movantik. Continent of Bladder: Yes. Frequency: 5-6 times daily. Management: Ambulates to bathroom. Pain is Managed:  Yes. Management: Tylenol and Oxy IR. Frequency of Intervention: Tylenol-1 time daily; Oxy IR-takes 1-2 times daily.   Adequately Controlled: Yes  Sleep: Adequate and Interventions to Support Sleep: Melatonin  Signs and Symptoms of Infection:  Yes. Site of Infection: + U/A. Antibiotic: PO Cipro. Signs and Symptoms of Skin Breakdown:  Yes. Site: Back incision. Wound Care: Dry dressing prn. Injury and/or Falls during Inpatient Rehabilitation Admission: Yes  Anticoagulants: Plavix  Diabetic: No  Consultations/Labs/X-rays: Cardiology consult for atrial septal defect and elevated right ventricular systolic blood pressure. Oxygen while on IP Rehab:  No    Home oxygen: No    No results for input(s): POCGLU in the last 72 hours. No results found for: Tianastarla Martinez, LDLDIRECT      Vitals:    02/15/22 0907 02/15/22 0908 02/15/22 1453 02/15/22 2115   BP: (!) 99/53   137/73   Pulse: 64 68  82   Resp: 17   16   Temp: 98.1 °F (36.7 °C)   97.9 °F (36.6 °C)   TempSrc: Oral   Oral   SpO2: 97%  97% 99%   Weight:       Height:              Family Education: Family available and participating in education   Fall Risk:  Falling star program initiated  Is the patient appropriate for a stay in the functional apartment? no    Discharge Plan   Estimated Discharge Date: 2/23/22   Destination: discharge home with supervision  Services at Discharge: [address], Occupational Therapy, Nursing and HHA 2x week  Is patient appropriate for an outpatient driving evaluation? no  Equipment at Discharge: Other: patient would be interested in 2 w/w tray if discharged with a 2 w/w for functional mobility. Pt. Reports she does have a shower chair with a back available if needed. May benefit from long handled reacher, dressing stick and long handled sponge.   Factors facilitating achievement of predicted outcomes: Family support, Friend support, Motivated, Cooperative and Pleasant  Barriers to the achievement of predicted outcomes: Decreased endurance and Lower extremity weakness  Follow up with physiatrist? no     Team Members Present at Conference:  Case Manager:GALLITO Benitez  Occupational Therapist:Lola Ferrer MOT, OTR/L [id number]  Physical Therapist:Shayla Tilley, [facility]  Speech Therapist:N/A  [place] RN      I approve the established interdisciplinary plan of care as documented within the medical record of Allyson Jordyn.     Jak Hsu MD

## 2022-02-14 NOTE — PROGRESS NOTES
Physical Medicine & Rehabilitation   Progress Note    Chief Complaint:  Lumbar stenosis. S/p lumbar espinoza with CSF leak. Rehab needs    Subjective:  Patient seen today, up in hallway with therapy. Patient doing very well, walking good distances with therapy. Patient with +BM 2/12/22. Patient states no headache today. Patient denies any needs or concerns at this time. Rehabilitation:  PT:   Bed Mobility:  Sit to Supine: Moderate Assistance, Of LEs. Pt using bed rail and required cuing for log roll technique. Transfers:  Sit to Stand: [employer], cues for hand placement  Stand to [place] [id number], cues for hand placement  Ambulation:  Contact Guard Assistance  Distance: 55 feet x1   Surface: Level Tile  Device:Rolling Walker  Gait Deviations:  Slow adwoa. Decreased B step lengths. Decreased B heel strike. Cuing for improved upright posture. Balance:  Dynamic Sitting Balance: Stand By Assistance, Pt sat EOM to complete there ex. Exercise:  Patient was guided in 1 set(s) 10 reps of exercise to both lower extremities. Seated marches, Seated hamstring curls, Seated heel/toe raises, Long arc quads, Seated abduction/adduction and Scapular retraction. Exercises were completed for increased independence with functional mobility. OT:  RANGE OF MOTION:  Right Upper Extremity: WFL  Left Upper Extremity:  WFL  Demonstrates severe RA in (B)hands with ulnar drift and swan neck deformities in all digits. STRENGTH:  Right Upper Extremity: shldr 4/5 elbow flex and ext 4/5  (F-)  Left Upper Extremity:  shldr 4/5 elbow flex and ext 4/5  (F-)  SENSATION:   WFL  ADL:   EATING:Setup or clean-up assistance. has severe RA in (B) hands with ulnar drift and swan neck deformities in digits. [place] CARE Score: 5. ORAL HYGIENE:Supervision or touching assistance. completed task in standing at sink with cues for 2 w/w placement for safety.  increased time to manipulate cap to open toothpaste tube and squeeze onto toothbrush. [place] [facility] CARE Score: 4. TOILETING HYGIENE:Partial/moderate assistance. SBA for hygiene for urine with some difficulty due to lumbar corset, MIN A for cleanliness for BM. CARE Score: 3. SHOWERING/BATHING:Partial/moderate assistance. reports she had assist in a.m. due to diarrhea to clean up prior to OT arrival.. CARE Score: 3.     UPPER BODY DRESSING:Setup or clean-up assistance. [place] [facility] CARE Score: 5. LOWER BODY DRESSING:Supervision or touching assistance. cues for tech to comply spinal precautions. [place] [facility] CARE Score: 4. FOOTWEAR:Supervision or touching assistance  able to cross legs to reach feet to complete task with some difficulty. [place] [facility] CARE Score: 4. TOILET TRANSFER: Supervision or touching assistance. wtih use of 2 w/w and grab bars. raised toilet seat over the toilet that was too high for patient and was lowered for her height with patient stating she liked it better when lowered. [place] [facility] CARE Score: 4. GROOMING: stood at sink and wash hands following toileting with patient remembering to place 2 w/w in proper place to complete task after first trial of standing at sink to complete oral care. Donned lumbar corset seated EOB with SBA and increased time to complete due to RA in (B) hands. BALANCE:  Sitting Balance:  Stand By Assistance. seated EOB  Standing Balance: Contact Guard Assistance. with 2 w/w for support  BED MOBILITY:  Supine to Sit: Stand By Assistance with bedrail and good demo of log rolling tech  TRANSFERS:  Sit to Stand:  Contact Guard Assistance. good hand placement; no facial grimacing   FUNCTIONAL MOBILITY:  Assistive Device: Rolling Walker  Assist Level:  Contact Guard Assistance. Distance: To and from bathroom      Review of Systems:  CONSTITUTIONAL:  positive for  fatigue  EYES:  negative  HEENT:  negative  RESPIRATORY:  negative  CARDIOVASCULAR:  negative  GASTROINTESTINAL:  Negative for constipation .  +BM 2/9/22  GENITOURINARY:  negative  SKIN: Lumbar incision  HEMATOLOGIC/LYMPHATIC:  negative  MUSCULOSKELETAL:  positive for pain and muscle weakness, Rheumatoid arthritis  NEUROLOGICAL:  positive for headaches, gait problems, weakness and pain  BEHAVIOR/PSYCH:  negative  System review otherwise negative      Objective:  /64   Pulse 79   Temp 98 °F (36.7 °C) (Oral)   Resp 18   Ht 5' 2\" (1.575 m)   Wt 118 lb 11.2 oz (53.8 kg)   SpO2 99%   BMI 21.71 kg/m²   awake  Orientation:   person, place, time  Mood: within normal limits  Affect: calm  General appearance: Patient is well nourished, well developed, well groomed and in no acute distress, appearing stated age     Memory:   normal,   Attention/Concentration: normal  Language:  normal     Cranial Nerves:  cranial nerves II-XII are grossly intact  ROM:  abnormal - limited lumbar  Tone:  normal  Muscle bulk: within normal limits  Sensory:  Sensory intact    Skin: warm and dry, no rash or erythema. Lumbar incision not visualized. Two drains noted from below dressing. Peripheral vascular: Pulses: Normal upper and lower extremity pulses; Edema: trace      Diagnostics:   No results found for this or any previous visit (from the past 24 hour(s)). Impression:  · Lumbar stenosis  ? L1-L4 laminectomies and a left side L2-L3 lumbar disk excision, CSF leak. On 2/4/22 with Dr Elda Taylor  · Lumbar pain with radiculopathy - improved post op  · Left leg weakness  · Atrial septal defect   · Sick sinus syndrome with pacemaker  · PAF  · CAD with hx stent  · HFpEF  · Rheumatoid arthritis  · COPD  · HTN  · HLD  · GERD  · UTI    Plan:  Continue current therapies  Prophylaxis: DVT - SCDs, JOEL hose, early ambulation, GI - Protonix  Pain: OxyIR, tylenol  Bowels: dulcolax, MOM, Movantik, Miralax, senokot  Bladder: UTI, cipro - stop date 2/17/22  RA: methotrexate weekly  Sleep: melatonin 3mg nightly PRN  · Continues with lumbar drain. Monitor output. Ortho following.    · Team conference Wednesday       Missed Therapy Time:  · None    Alia Spring, APRN - CNP

## 2022-02-14 NOTE — PROGRESS NOTES
Palomo Iqbal doing well resting in the chair. Improving with therapy. Drain output at 40mL over 24hr period. Ok to remove today with new dressing change.

## 2022-02-14 NOTE — PROGRESS NOTES
Clinical Pharmacy Note  Pharmacy Antimicrobial Monitoring    Current antibiotic(s): Cipro    Total days of antibiotics: 7    Indication/Diagnosis: UTI    Patient chart reviewed for signs/symptoms of infection: Yes    Signs/symptoms: Positive culture, abdominal/back pain    BP (!) 110/54   Pulse 71   Temp 97.7 °F (36.5 °C) (Oral)   Resp 12   Ht 5' 2\" (1.575 m)   Wt 118 lb 11.2 oz (53.8 kg)   SpO2 96%   BMI 21.71 kg/m²   Lab Results   Component Value Date    WBC 7.2 02/12/2022       Cultures:    Recommended stop date: 5-7 days    Prescriber contacted: No    Recommendations accepted: n/a prescribed appropriately

## 2022-02-15 PROCEDURE — 1180000000 HC REHAB R&B

## 2022-02-15 PROCEDURE — 97110 THERAPEUTIC EXERCISES: CPT

## 2022-02-15 PROCEDURE — 94760 N-INVAS EAR/PLS OXIMETRY 1: CPT

## 2022-02-15 PROCEDURE — 6370000000 HC RX 637 (ALT 250 FOR IP): Performed by: NURSE PRACTITIONER

## 2022-02-15 PROCEDURE — 97535 SELF CARE MNGMENT TRAINING: CPT

## 2022-02-15 PROCEDURE — 6370000000 HC RX 637 (ALT 250 FOR IP): Performed by: PHYSICAL MEDICINE & REHABILITATION

## 2022-02-15 PROCEDURE — 97530 THERAPEUTIC ACTIVITIES: CPT

## 2022-02-15 PROCEDURE — 97116 GAIT TRAINING THERAPY: CPT

## 2022-02-15 PROCEDURE — 99232 SBSQ HOSP IP/OBS MODERATE 35: CPT | Performed by: NURSE PRACTITIONER

## 2022-02-15 RX ORDER — HYDROCODONE BITARTRATE AND ACETAMINOPHEN 5; 325 MG/1; MG/1
2 TABLET ORAL EVERY 4 HOURS PRN
Status: DISCONTINUED | OUTPATIENT
Start: 2022-02-15 | End: 2022-02-15

## 2022-02-15 RX ORDER — HYDROCODONE BITARTRATE AND ACETAMINOPHEN 5; 325 MG/1; MG/1
1 TABLET ORAL EVERY 4 HOURS PRN
Status: DISCONTINUED | OUTPATIENT
Start: 2022-02-15 | End: 2022-02-23 | Stop reason: HOSPADM

## 2022-02-15 RX ORDER — CETIRIZINE HYDROCHLORIDE 10 MG/1
10 TABLET ORAL DAILY
Status: DISCONTINUED | OUTPATIENT
Start: 2022-02-15 | End: 2022-02-23 | Stop reason: HOSPADM

## 2022-02-15 RX ORDER — HYDROCODONE BITARTRATE AND ACETAMINOPHEN 5; 325 MG/1; MG/1
1 TABLET ORAL EVERY 4 HOURS PRN
Status: DISCONTINUED | OUTPATIENT
Start: 2022-02-15 | End: 2022-02-15

## 2022-02-15 RX ADMIN — SENNOSIDES AND DOCUSATE SODIUM 1 TABLET: 50; 8.6 TABLET ORAL at 09:10

## 2022-02-15 RX ADMIN — METOPROLOL TARTRATE 37.5 MG: 25 TABLET, FILM COATED ORAL at 21:26

## 2022-02-15 RX ADMIN — Medication 250 MG: at 10:32

## 2022-02-15 RX ADMIN — CLOPIDOGREL BISULFATE 75 MG: 75 TABLET, FILM COATED ORAL at 09:11

## 2022-02-15 RX ADMIN — ATORVASTATIN CALCIUM 10 MG: 10 TABLET, FILM COATED ORAL at 21:26

## 2022-02-15 RX ADMIN — OXYCODONE 10 MG: 5 TABLET ORAL at 06:31

## 2022-02-15 RX ADMIN — CIPROFLOXACIN 500 MG: 500 TABLET, FILM COATED ORAL at 09:09

## 2022-02-15 RX ADMIN — CETIRIZINE HYDROCHLORIDE 10 MG: 10 TABLET, FILM COATED ORAL at 10:32

## 2022-02-15 RX ADMIN — Medication 3 MG: at 21:26

## 2022-02-15 RX ADMIN — CIPROFLOXACIN 500 MG: 500 TABLET, FILM COATED ORAL at 21:26

## 2022-02-15 RX ADMIN — NALOXEGOL OXALATE 12.5 MG: 12.5 TABLET, FILM COATED ORAL at 09:09

## 2022-02-15 RX ADMIN — DIGOXIN 125 MCG: 125 TABLET ORAL at 09:11

## 2022-02-15 RX ADMIN — PANTOPRAZOLE SODIUM 40 MG: 40 TABLET, DELAYED RELEASE ORAL at 06:27

## 2022-02-15 ASSESSMENT — PAIN DESCRIPTION - PROGRESSION

## 2022-02-15 ASSESSMENT — PAIN SCALES - GENERAL
PAINLEVEL_OUTOF10: 8
PAINLEVEL_OUTOF10: 0

## 2022-02-15 NOTE — PROGRESS NOTES
[facility]  Inpatient Rehabilitation  Occupational Therapy  Progress Note  Time In:   Time Out: 2385  Timed Code Treatment Minutes: 90 Minutes  Minutes: 90          Date: 2/15/2022  Patient Name: Renata Brar,   Gender: female      Room: 6E-56/200-A  MRN: [id number]  : 1945  (77 y.o.)  Referring Practitioner: LUANN Ott  Diagnosis: spinal stenosis of lumbar region with radiculopathy  Additional Pertinent Hx: Per EMR \"Gail is a 75y/o female known to our office having been seen in the past several months with complaints of low back pain and leg pain. She has been through multiple epidural injections, most recently last month, with some mild-moderate relief of her pain. Over the past several days her pain had been worsening significantly to the point it was completely uncontrolled and her mobility had declined significantly. She called our office and the only recommendation was for her to present to the ED for evaluation and admission for further care. She does have a history of previous lumbar spine surgery roughly 20yrs ago. History of pacemaker, afib and is on plavix as well. She describes a sharp pain traveling from the low back into the left lateral/anterior thigh are. No new changes in bowel/bladder continence. \" L1-L4 Laminectomy with complication (durotomy) on     Restrictions/Precautions:  Restrictions/Precautions: General Precautions,Fall Risk,Surgical Protocols  Required Braces or Orthoses  Spinal: Lumbar Corset  Position Activity Restriction  Spinal Precautions: No Bending,No Lifting,No Twisting  Other position/activity restrictions: L1-L4 Laminectomy with complication (durotomy) on     SUBJECTIVE: Pt. Pleasant and cooperative throughout session. Pt. Asked appropriate questions in regards to spinal precautions. Nursing approved for pt to engage in shower this date. Pt.'s drain tube removed on previous date. PAIN: Pt. Denies pain throughout session. Pt. Reports she had pain medication prior to session. Vitals: Vitals not assessed per clinical judgement, see nursing flowsheet    COGNITION: Decreased Problem Solving and Decreased Safety Awareness    ADL:   Grooming: Supervision. standing sinkside to engage in grooming routines. Pt. demo good carryover with AD placement and abided by spinal precuatioins throughout standing grooming routines. Bathing: Pt. Required mod A for overall bathing task completion. Pt. Required assistance for BLE, feet and back. Pt. Stood with verbal cues for hand placement on grab bars to properly reach/wash bottom region. Pt. Educated on use of long handled sponge to assist with BLE and back. Pt. Provided with education on appropriate soap to use on incision site. Upper Extremity Dressing: Supervision with increased time (completed x2 trials as pt wears undershirt and regular shirt). Pt. Able to don/doff brace with Supervision on more than 1 attempt throughout ADL tasks. Lower Extremity Dressing: OTR provided hands on education in regards to [facility] to further advance performance with LB dressing. Pt. trailed dressing stick and reacher, pt could benefit from further education to utilize AE to abide by spinal precautions. Due to pt's chronic knee/hip deficits pt unable to complete LB dressing with figure 4 method while abiding by spinal precautions. Pt. Required mod A for LB dressing tasks this date with increased time. Pt. Able to assist at thigh/hip level to best ability. Toileting: Pt. Demo SBA with good AD placement. Pt. Able to grasp toilet paper on L side with no difficulty manipulating thin material.    Toilet Transfer: SBA with good hand placement/technique throughout. Tub Transfer: OTR positioned tub transfer bench in tub/shower combo to best suit pt's needs. Pt. Reports she would like to be able to step over the tub by time of d/c and utilize standard shower chair once returned home.   Pt. Required CGA to transfer in/out of tub with verbal cues for hand placement and to avoid twisting during transfer component. BALANCE:  Sitting Balance:  Modified Independent. Standing Balance: Stand By Assistance. BED MOBILITY:  Not tested. TRANSFERS:  Sit <-> stand SBA. FUNCTIONAL MOBILITY:  Assistive Device: Rolling Walker  Assist Level:  Stand By Assistance. Distance: To and from bathroom, within room and from shower room back to room. ADDITIONAL ACTIVITIES:  Education provided in regards to built up foam to modify ADL based items as needed/necessary. HAND ASSESSMENT       Left Hand Strength -  (lbs)  Handle Setting 2: 9, 10, 11 (10# average)  Right Hand Strength -  (lbs)  Handle Setting 2: 12, 14, 10 (12# average)              ASSESSMENT:  Activity Tolerance:  Patient tolerance of  treatment: good. Assessment:  Pt. Has demonstrated active engagement with OT sessions. Pt. Presents with but not limited to the following functional impairments: reduced carryover of spinal precautions, reduced ability to complete ADLs/IADLs at PLOF d/t current spinal precautions, reduced balance, reduced functional mobility during ADLs, reduced functional transfer performance and overall reduced safety all of which can directly impact pt's overall quality of life. Pt. would benefit from further training in regards to use of LHAE to abide by spinal precautions, modifications/adpatiations to daily tasks to promote safety/performance with current status with spinal precautions,  Activity tolerance building interventions, etc.  Without continued OT services pt is at risk of falls, further decline in functioning and increased dependency on others all of which can impact pt's ability to return to PLOF safely.       Discharge Recommendations: Continue to assess pending progress,Patient would benefit from continued therapy after discharge  Equipment Recommendations: Equipment Needed: Yes  Mobility Devices: ADL Assistive Devices  Other: monitor for DME/AE needs. patient would be interested in 2 w/w tray if discharged with a 2 w/w for functional mobility. Pt. Reports she does have a shower chair with a back available if needed. May benefit from long handled reacher, dressing stick and long handled sponge. Plan: Times per week: 5 x / wk for 90 minutes, 1 x / wk for 30 minutes  Times per day: Daily  Current Treatment Recommendations: Abbey Asencio Re-education,Patient/Caregiver Education & Training,Self-Care / ADL,Equipment Evaluation, Education, & procurement,Safety Education & Training    Patient Education  Patient Education: ADL's, Precautions and Equipment Education  Pt. Provided with written education in regards to spinal precautions. Goals  Short term goals  Time Frame for Short term goals: by discharge  Short term goal 1: patient will tolerate 7 min functional standing with two hand release with (S) to increase activity tolerance for grooming and toileting. NOT MET, CONTINUE  Short term goal 2: patient will demonstrate 4+/5 (B) UE strength and (F)  to increase UB strength for functional transfers and opening food containers. NOT MET, DISCONTINUE  Short term goal 3: patient will increase activity tolerance to functionally ambulate house hold distances with (S) and 0-1 verbal cues for spinal precautions and obstacle negotiation. NOT MET, DISCONTINUE  Short term goal 4: patient will be educated in (B) hand joint protection tech, and edu in (B) palmar stretching HEP to prevent further deformity and promote function. NOT MET, DISCONTINUE  Short term goal 5: patient will complete toileting routine with (S) and 0-1 verbal cues for spinal precautions. NOT MET, CONTINUE  Long term goals  Time Frame for Long term goals : 2 weeks  Long term goal 1: patient will complete tub transfer with (S).  NOT MET, CONTINUE  Long term goal 2: patient will complete light meal prep task with MOD I within spinal precautions. NOT MET, CONTINUE  Long term goal 3: patient will complete grocery shopping task with MOD I using strategies to reach items within spinal precautions. NOT MET, CONTINUE  Long term goal 4: patient will complete ADL routine with MOD I with use of AE PRN. NOT MET, CONTINUE  Long term goal 5: patient will retrieve items from various heights with least restrictive device and AE PRN with MOD I to safely transition to prior living environment to complete IADLs. NOT MET, CONTINUE    Revised Short-Term Goals  Short term goals  Time Frame for Short term goals: by discharge  Short term goal 1: patient will tolerate 7 min functional standing with two hand release with (S) to increase activity tolerance for grooming and toileting. Short term goal 2: Pt. to complete sinkside grooming with set up/clean up assistance with good carryover reaching items without twisting. Short term goal 3: Discontinued  Short term goal 4: Discontinued  Short term goal 5: patient will complete toileting routine with (S) and 0-1 verbal cues for spinal precautions. Long term goals  Time Frame for Long term goals : 2 weeks  Long term goal 1: patient will complete tub transfer with (S). Long term goal 2: patient will complete light meal prep task with MOD I within spinal precautions. Long term goal 3: patient will complete grocery shopping task with MOD I using strategies to reach items within spinal precautions. Long term goal 4: patient will complete ADL routine with MOD I with use of AE PRN. Long term goal 5: patient will retrieve items from various heights with least restrictive device and AE PRN with MOD I to safely transition to prior living environment to complete IADLs. Following session, patient left in safe position with all fall risk precautions in place.

## 2022-02-15 NOTE — PROGRESS NOTES
Grooming: Stand By Assistance. wash hands standing at sink with good placement of 2 w/w  Lower Extremity Dressing: Stand By Assistance. for R sock seated in recliner with increased time with patient crossing leg to reach foot with patient's leg frequently sliding off supporting leg. MIN A to adjust L sock after increased time and patient fatigued from trying. Toileting: Stand By Assistance. for clothing mgmt and toilet hygiene for urine. patient asked this writer to wipe bottom due to not wanting to twist and did not have a BM. Toilet Transfer: Stand By Assistance. with use of 2 w/w and grab bars. Donned lumbar corset seated EOB with SBA. BALANCE:  Sitting Balance:  Independent. .  Standing Balance: Stand By Assistance. with use of 2 w/w for support  BED MOBILITY:  Supine to Sit: Stand By Assistance increased time and cues for tech for spinal precautions  TRANSFERS:  Sit to Stand:  Stand By Assistance. cues to push from EOB instead of pulling on 2 w/w  FUNCTIONAL MOBILITY:  Assistive Device: Rolling Walker  Assist Level:  Stand By Assistance. Distance: To and from bathroom  No c/o fatigue or pain. ADDITIONAL ACTIVITIES:  Participated in MPSTOR dyn standing activity with one-two hand release and cues to not twist due to reaching cross midline for object out of SUBHA with SBA x 5 min, 7 sec to increase activity tolerance for IADLs. Patient wanted to sit in recliner at end of treatment session and removed her brace with SBA. Review of Systems:  CONSTITUTIONAL:  positive for  fatigue  EYES:  negative  HEENT:  negative  RESPIRATORY:  negative  CARDIOVASCULAR:  negative  GASTROINTESTINAL:  Negative for constipation .  +BM 2/12/22  GENITOURINARY:  negative  SKIN:  Lumbar incision  HEMATOLOGIC/LYMPHATIC:  negative  MUSCULOSKELETAL:  positive for pain and muscle weakness, Rheumatoid arthritis  NEUROLOGICAL:  positive for headaches, gait problems, weakness and pain  BEHAVIOR/PSYCH:  negative  System review otherwise negative      Objective:  BP (!) 99/53   Pulse 68   Temp 98.1 °F (36.7 °C) (Oral)   Resp 17   Ht 5' 2.01\" (1.575 m)   Wt 118 lb 11.2 oz (53.8 kg)   SpO2 97%   BMI 21.70 kg/m²   Awake but groggy  Orientation:   person, place, time  Mood: within normal limits  Affect: calm  General appearance: Patient is well nourished, well developed, well groomed and in no acute distress, appearing stated age     Memory:   normal,   Attention/Concentration: normal  Language:  normal     Cranial Nerves:  cranial nerves II-XII are grossly intact  ROM:  abnormal - limited lumbar  Tone:  normal  Muscle bulk: within normal limits  Sensory:  Sensory intact    Skin: warm and dry, no rash or erythema. Lumbar incision not visualized. Peripheral vascular: Pulses: Normal upper and lower extremity pulses; Edema: trace      Diagnostics:   No results found for this or any previous visit (from the past 24 hour(s)). Impression:  · Lumbar stenosis  ? L1-L4 laminectomies and a left side L2-L3 lumbar disk excision, CSF leak. On 2/4/22 with Dr Saurabh Pena  · Lumbar pain with radiculopathy - improved post op  · Left leg weakness  · Atrial septal defect   · Sick sinus syndrome with pacemaker  · PAF  · CAD with hx stent  · HFpEF  · Rheumatoid arthritis  · COPD  · HTN  · HLD  · GERD  · UTI    Plan:  Continue current therapies  Prophylaxis: DVT - SCDs, JOEL hose, early ambulation, GI - Protonix  Pain: Norco 5/325mg every 4 hours PRN, tylenol  Bowels: dulcolax, MOM, Movantik, Miralax, senokot  Bladder: UTI, cipro - stop date 2/17/22  RA: methotrexate weekly  Sleep: melatonin 3mg nightly PRN  Zyrtec for itching - avoiding benadryl due to drowsiness.    · Lumbar drain removed 2/14/22  · Team conference Wednesday       Missed Therapy Time:  · None    Alia Spring, APRN - CNP

## 2022-02-15 NOTE — PROGRESS NOTES
Equipment: Grab bars in shower (reports she has access to a shower chair with a back)  Bathroom Accessibility: Accessible (patient states she will have difficulty getting a 2 w/w into the BR at home. will have to side step or furniture walk)    Receives Help From: Friend(s)  ADL Assistance: Kathleen: Independent  Homemaking Responsibilities: Yes  Ambulation Assistance: Independent  Transfer Assistance: Independent  Active : Yes  Additional Comments: Pt IND prior, family in the area to help as needed. Pt was using cane about 1-2 weeks prior to admission due to pain    Restrictions/Precautions:  Restrictions/Precautions: General Precautions,Fall Risk,Surgical Protocols  Required Braces or Orthoses  Spinal: Lumbar Corset  Position Activity Restriction  Spinal Precautions: No Bending,No Lifting,No Twisting  Other position/activity restrictions: L1-L4 Laminectomy with complication (durotomy) on 2/4     SUBJECTIVE: pt in bed upon arrival and agrees to therapy. Pt pleasant and cooperative for session. PAIN: no c/o pain    Vitals: Vitals not assessed per clinical judgement, see nursing flowsheet    OBJECTIVE:  Bed Mobility:  Rolling to Left: Stand By Assistance, with verbal cues , with increased time for completion   Supine to Sit: Stand By Assistance, with verbal cues , with increased time for completion  Scooting: Stand By Assistance, with verbal cues , with increased time for completion    Transfers:  Sit to Stand: Stand By Assistance, with increased time for completion, cues for hand placement, with verbal cues  Stand to Sit:Stand By Assistance    Ambulation:  Stand By Assistance, with verbal cues , with increased time for completion  Distance: [id number]  Surface: Level Tile  Device:Rolling Walker  Gait Deviations:   Forward Flexed Posture, Slow Tianna, Decreased Step Length Bilaterally, Decreased Gait Speed, Decreased Heel Strike Bilaterally, Mild Path Deviations and Unsteady Gait    Balance:  Dynamic Standing Balance: Stand By Assistance, Contact Guard Assistance  Pt stood on foam pad and completed reaching activity outside of SUBHA in all directions, without UE support. Pt then tossed object at target on floor. Pt grossly steady no LOB pt then used a reacher and picked up all objects from the floor using RW for support. . pt stood and completed tasks for ~8 mins. Grossly steady, no real LOB      Functional Outcome Measures: Completed       ASSESSMENT:  Assessment: Patient progressing toward established goals. Activity Tolerance:  Patient tolerance of  treatment: good. Equipment Recommendations:Equipment Needed: No  Discharge Recommendations: Patient would benefit from continued PT at discharge  Plan: Times per week: 5x/wk 90min, 1x/wk 30min  Times per day: Daily  Plan weeks: 2  Current Treatment Recommendations: [facility] Re-education,Patient/Caregiver Education & Training,Balance Training,Gait Training,Home Exercise Program,Functional Mobility Training,Stair training,Safety Education & Training    Patient Education  Patient Education: Plan of Care, Transfers, Gait, Verbal Exercise Instruction    Goals:  Patient goals : \"be able to be self sufficient\"  Short term goals  Time Frame for Short term goals: 1 week  Short term goal 1: Patient will complete supine < > sit with SBA to transfer in/out of bed with decreased difficulty. Short term goal 2: Patient will complete sit < > stand with SBA with a RW to stand to ambulate with decreased difficulty. Short term goal 3: Patient will ambulate Deandre  1560' with a RW and SBA to progress towards navigating home safely. Short term goal 4: Patient will ascend/descend 2, 6\" step with a RW with CGA to progress towards safe home entry. Short term goal 5: Patient will improve tinetti to greater than or equal to 19/28 to reduce her risk for falls.   Long term goals  Time Frame for Long term goals : 2 weeks  Long term goal 1: Patient will complete supine  < > sit with modified independence to transfer in/out of bed safely. Long term goal 2: Patient will sit < > stand with modified independence to stand to ambulate safely. Long term goal 3: Patient will ambulate 80' with a RW and modified independence to navigate home safely. Long term goal 4: Patient will ascend/descend 3, 6\" steps with a RW and modified independence for safe home access. Long term goal 5: Patient will ascend/descend 2, 6\" steps with a hand rail and modified independence for garage entry. Long term goal 6: Patient will complete car transfer with modified independence to transfer in/out of vehicle safely. Following session, patient left in safe position with all fall risk precautions in place.

## 2022-02-15 NOTE — PROGRESS NOTES
[address] [address]  INPATIENT PHYSICAL THERAPY  PROGRESS NOTE  [address] [facility],[address] - [id number]    Time In: 0730  Time Out: 0830  Timed Code Treatment Minutes: 61 Minutes  Minutes: 60          Date: 2/15/2022  Patient Name: Rance Riedel,  Gender:  female        MRN: [id number]  : 1945  (77 y.o.)  Referral Date : 22  Referring Practitioner: LUANN Davison (referring), Rosie Stinson MD  Diagnosis: Spinal stenosis of lumbar region with radiculopathy  Additional Pertinent Hx: Per H&P: \"Gail Wyman  is a 77 y.o. right-handed  female with a history of hypertension, GERD, hyperlipidemia, thyroid nodule, atrial fibrillation requiring WATCHMAN procedure, rheumatoid arthritis with bilateral hand fingers deformity, COPD and emphysema, diverticulosis, coronary artery disease requiring coronary arteries angioplasty and stenting, congestive heart failure, anemia, status post pacemaker placement, status post bilateral total knee arthroplasties and bilateral total hip arthroplasty, status post multiple bilateral feet surgeries for deformity, status post lumbar spine surgeries in  and , status post appendectomy, hysterectomy, hemorrhoidectomy, is admitted to the inpatient rehabilitation unit on 2022 for intensive inpatient rehabilitation treatment of impaired ADLs and ambulation secondary to lumbar spinal stenosis with left lower extremity radiculopathy requiring lumbar laminectomy and disc excision on 2022 by Dr. Jason Tong. \"  Pt transferred to inpatient rehab 22.      Prior Level of Function:  Lives With: Alone  Type of Home: House  Home Layout: One level  Home Access: Stairs to enter without rails  Entrance Stairs - Number of Steps: 2 steps to garage with left hand rail, 1 + 1 +1 step into home  Home Equipment: Rolling walker,Standard walker,Wheelchair-manual,Cane   Bathroom Shower/Tub: Tub/Shower unit  Bathroom Toilet: Handicap height  Bathroom Equipment: Grab bars in shower (reports she has access to a shower chair with a back)  Bathroom Accessibility: Accessible (patient states she will have difficulty getting a 2 w/w into the BR at home. will have to side step or furniture walk)    Receives Help From: Friend(s)  ADL Assistance: [address]: Independent  Homemaking Responsibilities: Yes  Ambulation Assistance: Independent  Transfer Assistance: Independent  Active : Yes  Additional Comments: Pt IND prior, family in the area to help as needed. Pt was using cane about 1-2 weeks prior to admission due to pain    Restrictions/Precautions:  Restrictions/Precautions: General Precautions,Fall Risk,Surgical Protocols  Required Braces or Orthoses  Spinal: Lumbar Corset  Position Activity Restriction  Spinal Precautions: No Bending,No Lifting,No Twisting  Other position/activity restrictions: L1-L4 Laminectomy with complication (durotomy) on 2/4     SUBJECTIVE: pt in recliner upon arrival, pleasant and cooperative for session. Did request to use restroom during session.      PAIN: back, 2/10    Vitals: Vitals not assessed per clinical judgement, see nursing flowsheet    OBJECTIVE:  Bed Mobility:  Rolling to Right: Stand By Assistance, with head of bed flat, without rail, with verbal cues , with increased time for completion   Supine to Sit: Stand By Assistance, with head of bed flat, without rail, with verbal cues , with increased time for completion  Sit to Supine: Contact Guard Assistance, with head of bed flat, without rail, with verbal cues , with increased time for completion   Scooting: Stand By Assistance, with verbal cues , with increased time for completion    Transfers:  Sit to Stand: Stand By Assistance, with increased time for completion, cues for hand placement, with verbal cues  Stand to Sit:Stand By Assistance, with increased time for completion, cues for hand placement, with verbal cues  Car:Stand By Assistance, with increased time for completion, cues for hand placement, with verbal cues, pt did complete 2x and requires much extra time for completion    Ambulation:  Stand By Assistance, with increased time for completion  Distance: [id number]  Surface: Level Tile  Device:Rolling Walker  Gait Deviations: Forward Flexed Posture, Slow Tianna, Decreased Step Length Bilaterally, Decreased Gait Speed, Decreased Heel Strike Bilaterally, Mild Path Deviations, Unsteady Gait and Decreased Terminal Knee Extension    Stairs:  Stand By Assistance, with verbal cues , with increased time for completion  Number of Steps: 3  Height: 6\" step with Rolling Walker  Good carryover with cues for technique, steady no LOB    Balance:  pt complteted functional tasks in bathroom with RW support and cues for safety with spinal precutions and walker use    TINETTI BALANCE TEST    BALANCE Patient is seated in a hard, armless chair   1 SITTING BALANCE 1 - Steady, safe   2. RISES FROM CHAIR 1 - Able, uses arms to help   3. ATTEMPTS TO RISE 2 - Able to rise, 1 attempt   4. IMMEDIATE STANDING BALANCE (FIRST 5 SECONDS) 2 - Steady without walker or other support   5. STANDING BALANCE 2 - Narrow stance without support   6. NUDGED 2 - Steady   7. EYES CLOSED 1 - Steady   8. TURNING 360 DEGREES 1 - Continuous and 0 - Unsteady (grabs,staggers)   9. SITTING DOWN 2 - Safe,smooth motion   BALANCE SCORE 14/16       GAIT SECTION Patient stands with therapist, walks across room (+/- aids), fist at usual pace, then at rapid pace. 1.  INDICATION OF GAIT (Immediately after told to \"go\" 1 - No hesitancy   2. STEP LENGTH AND HEIGHT 1 - Step through Right and 1 - Step through Left   3. FOOT CLEARANCE 1 - Left foot clears floor and 1 - Right foot clears floor   4. STEP SYMMETRY 1 - Right and left step length appear equal   5. STEP CONTINUITY 1 - Steps appear continuous   6. PATH 1 - Mild/moderate deviation or uses walking aid   7.   TRUNK 0 - Marked sway or uses walking aid   8. WALKING TIME 1 - Heels almost touching while walking   GAIT SCORE 9/12   TOTAL SCORE 23/28   Risk Indicators:  Less than/equal to 18 = high risk  19-23 Moderate risk  Greater than/equal to 24 = low risk Moderate risk           Exercise:  Patient was guided in 1 set(s) 15 reps of exercise to both lower extremities. Ankle pumps, Glut sets, Quad sets, Heelslides and Hooklying hip abduction/adduction. Exercises were completed for increased independence with functional mobility. Functional Outcome Measures: Completed       ASSESSMENT:  Assessment: Patient progressing toward established goals. Patient met 4/5 STG's and 0/6 LTG's. Patient is making good gains despite her short length of stay on inpatient rehab. Patient progressed supine < > sit from SBA/min assist to SBA/CGA, sit < > stand from CGA to SBA, gait from CGA to SBA with ambulation and was able to ascend/descend 3 platform steps with a RW with SBA. Patient does require verbal cues to safely complete functional mobility and scored a 23/28 on the tinetti indicating moderate risk for falls. Patient would benefit from continued skilled PT services to continue to improve her ability to complete functional mobility, reduce her risk for falls and allow patient to return to OF safely. Activity Tolerance:  Patient tolerance of  treatment: good.       Equipment Recommendations:Equipment Needed: No  Discharge Recommendations: Patient would benefit from continued PT at discharge  Plan: Times per week: 5x/wk 90min, 1x/wk 30min  Times per day: Daily  Plan weeks: 2  Current Treatment Recommendations: [facility] Re-education,Patient/Caregiver Education & Training,Balance Training,Gait Training,Home Exercise Program,Functional Mobility Training,Stair training,Safety Education & Training    Patient Education  Patient Education: Plan of Care, Precautions/Restrictions, Bed Mobility, Transfers, Gait, Stairs, Car Transfers, Verbal Exercise Instruction    Goals:  Patient goals : Dhruv Shaw able to be self sufficient\"  Short term goals  Time Frame for Short term goals: 1 week  Short term goal 1: Patient will complete supine < > sit with SBA to transfer in/out of bed with decreased difficulty. NOT MET  Short term goal 2: Patient will complete sit < > stand with SBA with a RW to stand to ambulate with decreased difficulty. MET  Short term goal 3: Patient will ambulate 80' with a RW and SBA to progress towards navigating home safely. MET  Short term goal 4: Patient will ascend/descend 2, 6\" step with a RW with CGA to progress towards safe home entry. MET  Short term goal 5: Patient will improve tinetti to greater than or equal to 19/28 to reduce her risk for falls. MET  Long term goals  Time Frame for Long term goals : 2 weeks  Long term goal 1: Patient will complete supine  < > sit with modified independence to transfer in/out of bed safely. NOT MET  Long term goal 2: Patient will sit < > stand with modified independence to stand to ambulate safely. NOT MET  Long term goal 3: Patient will ambulate 80' with a RW and modified independence to navigate home safely. NOT MET  Long term goal 4: Patient will ascend/descend 3, 6\" steps with a RW and modified independence for safe home access. NOT MET  Long term goal 5: Patient will ascend/descend 2, 6\" steps with a hand rail and modified independence for garage entry. NOT MET  Long term goal 6: Patient will complete car transfer with modified independence to transfer in/out of vehicle safely. NOT MET    Revised Short-Term Goals:    Short term goals  Time Frame for Short term goals: 1 week  Short term goal 1: Patient will complete supine < > sit with SBA to transfer in/out of bed with decreased difficulty. Short term goal 2: Patient will complete sit < > stand with SBA with a RW to stand to ambulate with decreased difficulty.  MET, SEE LTG  Short term goal 3: Patient will ambulate 100' with a RW and SBA to progress towards navigating home safely. MET, SEE LTG  Short term goal 4: Patient will ascend/descend 2, 6\" step with a RW with CGA to progress towards safe home entry. MET, SEE LTG  Short term goal 5: Patient will improve tinetti to greater than or equal to 19/28 to reduce her risk for falls. MET, SEE LTG    Revised Long-Term Goals  Long term goals  Time Frame for Long term goals : 2 weeks  Long term goal 1: Patient will complete supine  < > sit with modified independence to transfer in/out of bed safely. Long term goal 2: Patient will sit < > stand with modified independence to stand to ambulate safely. Long term goal 3: Patient will ambulate 80' with a RW and modified independence to navigate home safely. Long term goal 4: Patient will ascend/descend 3, 6\" steps with a RW and modified independence for safe home access. Long term goal 5: Patient will ascend/descend 2, 6\" steps with a hand rail and modified independence for garage entry. Long term goal 6: Patient will complete car transfer with modified independence to transfer in/out of vehicle safely. Long term goal 7: Patient will improve tinetti to greater than or equal to 24/28 to reduce her risk for falls    Following session, patient left in safe position with all fall risk precautions in place. Treatment and documentation completed by Isabela Stuart PTA. Goal revision and assessment for progress note completed by Jamal Tapia, PT, DPT.

## 2022-02-16 PROCEDURE — 97110 THERAPEUTIC EXERCISES: CPT

## 2022-02-16 PROCEDURE — 99233 SBSQ HOSP IP/OBS HIGH 50: CPT | Performed by: PHYSICAL MEDICINE & REHABILITATION

## 2022-02-16 PROCEDURE — 97530 THERAPEUTIC ACTIVITIES: CPT

## 2022-02-16 PROCEDURE — 1180000000 HC REHAB R&B

## 2022-02-16 PROCEDURE — 6370000000 HC RX 637 (ALT 250 FOR IP): Performed by: NURSE PRACTITIONER

## 2022-02-16 PROCEDURE — 6370000000 HC RX 637 (ALT 250 FOR IP): Performed by: PHYSICAL MEDICINE & REHABILITATION

## 2022-02-16 PROCEDURE — 97535 SELF CARE MNGMENT TRAINING: CPT

## 2022-02-16 PROCEDURE — 97116 GAIT TRAINING THERAPY: CPT

## 2022-02-16 RX ORDER — ASPIRIN 325 MG
2 TABLET ORAL DAILY
Status: DISCONTINUED | OUTPATIENT
Start: 2022-02-17 | End: 2022-02-23 | Stop reason: HOSPADM

## 2022-02-16 RX ORDER — ANTIOX #8/OM3/DHA/EPA/LUT/ZEAX 250-2.5 MG
1 CAPSULE ORAL 2 TIMES DAILY
Status: DISCONTINUED | OUTPATIENT
Start: 2022-02-16 | End: 2022-02-23 | Stop reason: HOSPADM

## 2022-02-16 RX ORDER — LANOLIN ALCOHOL/MO/W.PET/CERES
400 CREAM (GRAM) TOPICAL NIGHTLY
Status: DISCONTINUED | OUTPATIENT
Start: 2022-02-16 | End: 2022-02-23 | Stop reason: HOSPADM

## 2022-02-16 RX ADMIN — PANTOPRAZOLE SODIUM 40 MG: 40 TABLET, DELAYED RELEASE ORAL at 06:34

## 2022-02-16 RX ADMIN — DIGOXIN 125 MCG: 125 TABLET ORAL at 10:02

## 2022-02-16 RX ADMIN — CLOPIDOGREL BISULFATE 75 MG: 75 TABLET, FILM COATED ORAL at 10:02

## 2022-02-16 RX ADMIN — Medication 1 EACH: at 20:11

## 2022-02-16 RX ADMIN — ACETAMINOPHEN 650 MG: 325 TABLET ORAL at 22:19

## 2022-02-16 RX ADMIN — Medication 250 MG: at 14:11

## 2022-02-16 RX ADMIN — ACETAMINOPHEN 650 MG: 325 TABLET ORAL at 04:08

## 2022-02-16 RX ADMIN — CIPROFLOXACIN 500 MG: 500 TABLET, FILM COATED ORAL at 20:09

## 2022-02-16 RX ADMIN — SENNOSIDES AND DOCUSATE SODIUM 1 TABLET: 50; 8.6 TABLET ORAL at 10:02

## 2022-02-16 RX ADMIN — CETIRIZINE HYDROCHLORIDE 10 MG: 10 TABLET, FILM COATED ORAL at 10:02

## 2022-02-16 RX ADMIN — NALOXEGOL OXALATE 12.5 MG: 12.5 TABLET, FILM COATED ORAL at 10:02

## 2022-02-16 RX ADMIN — Medication 3 MG: at 20:09

## 2022-02-16 RX ADMIN — ATORVASTATIN CALCIUM 10 MG: 10 TABLET, FILM COATED ORAL at 20:09

## 2022-02-16 RX ADMIN — METOPROLOL TARTRATE 37.5 MG: 25 TABLET, FILM COATED ORAL at 20:09

## 2022-02-16 RX ADMIN — ACETAMINOPHEN 650 MG: 325 TABLET ORAL at 10:02

## 2022-02-16 RX ADMIN — METOPROLOL TARTRATE 37.5 MG: 25 TABLET, FILM COATED ORAL at 10:02

## 2022-02-16 RX ADMIN — ACETAMINOPHEN 650 MG: 325 TABLET ORAL at 14:11

## 2022-02-16 RX ADMIN — CIPROFLOXACIN 500 MG: 500 TABLET, FILM COATED ORAL at 10:02

## 2022-02-16 RX ADMIN — Medication 400 MCG: at 20:12

## 2022-02-16 ASSESSMENT — PAIN DESCRIPTION - LOCATION
LOCATION: BACK
LOCATION: BACK

## 2022-02-16 ASSESSMENT — PAIN DESCRIPTION - DESCRIPTORS
DESCRIPTORS: SORE;DISCOMFORT
DESCRIPTORS: SORE;DISCOMFORT

## 2022-02-16 ASSESSMENT — PAIN SCALES - GENERAL
PAINLEVEL_OUTOF10: 1
PAINLEVEL_OUTOF10: 2
PAINLEVEL_OUTOF10: 0
PAINLEVEL_OUTOF10: 2
PAINLEVEL_OUTOF10: 0
PAINLEVEL_OUTOF10: 5

## 2022-02-16 ASSESSMENT — PAIN DESCRIPTION - PROGRESSION
CLINICAL_PROGRESSION: GRADUALLY IMPROVING
CLINICAL_PROGRESSION: NOT CHANGED
CLINICAL_PROGRESSION: NOT CHANGED
CLINICAL_PROGRESSION: GRADUALLY IMPROVING
CLINICAL_PROGRESSION: NOT CHANGED
CLINICAL_PROGRESSION: NOT CHANGED

## 2022-02-16 ASSESSMENT — PAIN DESCRIPTION - FREQUENCY
FREQUENCY: INTERMITTENT
FREQUENCY: INTERMITTENT

## 2022-02-16 ASSESSMENT — PAIN DESCRIPTION - ONSET
ONSET: ON-GOING
ONSET: ON-GOING

## 2022-02-16 ASSESSMENT — PAIN DESCRIPTION - ORIENTATION
ORIENTATION: MID
ORIENTATION: MID;LOWER

## 2022-02-16 ASSESSMENT — PAIN - FUNCTIONAL ASSESSMENT
PAIN_FUNCTIONAL_ASSESSMENT: PREVENTS OR INTERFERES SOME ACTIVE ACTIVITIES AND ADLS
PAIN_FUNCTIONAL_ASSESSMENT: PREVENTS OR INTERFERES SOME ACTIVE ACTIVITIES AND ADLS

## 2022-02-16 ASSESSMENT — PAIN DESCRIPTION - PAIN TYPE
TYPE: SURGICAL PAIN
TYPE: SURGICAL PAIN

## 2022-02-16 NOTE — PLAN OF CARE
Problem: Falls - Risk of:  Goal: Will remain free from falls  Description: Will remain free from falls  Outcome: Ongoing  Falling star prevention in place. Bed and chair alarms in use. Call light in reach. Purposeful hourly rounding. Problem: Infection - Surgical Site:  Goal: Will show no infection signs and symptoms  Description: Will show no infection signs and symptoms  Outcome: Ongoing  No signs or symptoms of infection noted in back incision    Problem: Mobility - Impaired:  Goal: Mobility will improve to maximum level  Description: Mobility will improve to maximum level  Outcome: Ongoing  Patient continues with therapies and is working toward discharge goals. Problem: Venous Thromboembolism:  Goal: Absence of signs or symptoms of impaired coagulation  Description: Absence of signs or symptoms of impaired coagulation  Outcome: Ongoing  No signs or symptoms of DVT noted. Negative estella's sign. Problem: Pain:  Goal: Pain level will decrease  Description: Pain level will decrease  Outcome: Ongoing  Pain decreases with rest, repositioning, ice application, and prn pain medications.

## 2022-02-16 NOTE — PROGRESS NOTES
[address]  Occupational Therapy  Daily Note  Time:    Time In: 1030  Time Out: 1200  Timed Code Treatment Minutes: 90 Minutes  Minutes: 90          Date: 2022  Patient Name: Lisbeth Borden,   Gender: female      Room: [facility]/200-A  MRN: [id number]  : 1945  (77 y.o.)  Referring Practitioner: LUANN Bowman  Diagnosis: spinal stenosis of lumbar region with radiculopathy  Additional Pertinent Hx: Per EMR \"Gail is a 77y/o female known to our office having been seen in the past several months with complaints of low back pain and leg pain. She has been through multiple epidural injections, most recently last month, with some mild-moderate relief of her pain. Over the past several days her pain had been worsening significantly to the point it was completely uncontrolled and her mobility had declined significantly. She called our office and the only recommendation was for her to present to the ED for evaluation and admission for further care. She does have a history of previous lumbar spine surgery roughly 20yrs ago. History of pacemaker, afib and is on plavix as well. She describes a sharp pain traveling from the low back into the left lateral/anterior thigh are. No new changes in bowel/bladder continence. \" L1-L4 Laminectomy with complication (durotomy) on     Restrictions/Precautions:  Restrictions/Precautions: General Precautions,Fall Risk,Surgical Protocols  Required Braces or Orthoses  Spinal: Lumbar Corset  Position Activity Restriction  Spinal Precautions: No Bending,No Lifting,No Twisting  Other position/activity restrictions: L1-L4 Laminectomy with complication (durotomy) on       SUBJECTIVE: cooperative, talkative, motivated & asking good questions.      PAIN: no #/10: no number given-mild pain complaints during session    Vitals: Vitals not assessed per clinical judgement, see nursing flowsheet    COGNITION: Decreased Insight, Decreased Problem Solving and Decreased Safety Awareness    ADL:   Lower Extremity Dressing: Maximum Assistance. for donning B shoes (and tying) Pt reports she has slip on shoes that are not as tight at home  Toileting: Moderate Assistance. A for completeness of cleaning bottom after BM (educated Pt on toilet tongs vs toilet aide & left handout in room for Pt to decide with family)  Toilet Transfer: Stand By Assistance. with ETS.    **completed toileting x 2 during session    BALANCE:  Standing Balance: Stand By Assistance. BED MOBILITY:  Not Tested    TRANSFERS:  Sit to Stand:  Contact Guard Assistance from recliners, armed chair, w/c; needed min A for sit-stand from kitchen chair without arms (discussed possibly having family move an armed chair to her table)  Stand to Sit: [employer]. FUNCTIONAL MOBILITY:  Assistive Device: Rolling Walker  Assist Level:  Stand By Assistance. Distance: To and from bathroom and To and from therapy apartment   Up/down porch step with CGA & min vcs for technique        ADDITIONAL ACTIVITIES:  Completed task of putting clothing in washer-educated Pt on technique for maintaining precautions (having detergent left station area so she is not having to move, using reacher to get items out of washer). Stood x 4 min during activity. Discussed home safety with removal of rugs & making sure areas are wide enough for walker use. Pt reports bathroom is very small & she will have to side step into bathroom with her RW. Also discussed keeping often used pans on countertop to prevent her from having to get into low cabinets. Completed task of retrieving items in [place] [address] grocery store with SBA while pushing grocery cart x 7 min. Education on reaching items without breaking precautions. ASSESSMENT:     Activity Tolerance:  Patient tolerance of  treatment: good.         Discharge Recommendations: Home with Home Health OT  Equipment Recommendations: Equipment Needed: Yes  Mobility Devices: ADL Assistive Devices  Other: patient would be interested in 2 w/w tray if discharged with a 2 w/w for functional mobility. Pt. Reports she does have a shower chair with a back available if needed. May benefit from long handled reacher, dressing stick and long handled sponge. Plan: Times per week: 5 x / wk for 90 minutes, 1 x / wk for 30 minutes  Times per day: Daily  Current Treatment Recommendations: Ligia Hyde Re-education,Patient/Caregiver Education & Training,Self-Care / ADL,Equipment Evaluation, Education, & procurement,Safety Education & Training    Patient Education  Patient Education: see above    Goals  Short term goals  Time Frame for Short term goals: by discharge  Short term goal 1: patient will tolerate 7 min functional standing with two hand release with (S) to increase activity tolerance for grooming and toileting. Short term goal 2: Pt. to complete sinkside grooming with set up/clean up assistance with good carryover reaching items without twisting. Short term goal 3: Discontinued  Short term goal 4: Discontinued  Short term goal 5: patient will complete toileting routine with (S) and 0-1 verbal cues for spinal precautions. Long term goals  Time Frame for Long term goals : 2 weeks  Long term goal 1: patient will complete tub transfer with (S). Long term goal 2: patient will complete light meal prep task with MOD I within spinal precautions. Long term goal 3: patient will complete grocery shopping task with MOD I using strategies to reach items within spinal precautions. Long term goal 4: patient will complete ADL routine with MOD I with use of AE PRN. Long term goal 5: patient will retrieve items from various heights with least restrictive device and AE PRN with MOD I to safely transition to prior living environment to complete IADLs.     Following session, patient left in safe position with all fall risk precautions in place.

## 2022-02-16 NOTE — FLOWSHEET NOTE
[facility]. [facility] 88 PROGRESS NOTE      Patient: Luisito Rasheed  Room #: 6D-07/978-V            YOB: 1945  Age: 77 y.o. Gender: female            Admit Date & Time: 2/11/2022  3:36 PM    Assessment:  Pt is a 76y. o. female, sitting in easychair watching television, in 7E-59. Gail is pleasant, approachable and looking forward to an upcoming basketball game she'll be watching. She shared that she hasn't been to her Mosque personally in some time, thanks to [place] and her own health issues over time. A few days removed from back surgery, she is getting used to the therapy/rehabilitation routine. She shared that she has some family members and good friends at the ready once she comes home, to care for her and assist where they can. She also shared that she loves her God, loves Ravinder Saravia, is thankful for His [employer] and how He's seen her through some challenges in her life. Interventions:   provided active listening, prayer, discussed her relationship with God, encouragement and conversation. Outcomes:  Gail expressed gratitude for the prayer and visit, sharing a humorous story about her name and local restaurant. Plan:  1. Continue spiritual support as she is on the front end of her rehabilitation journey. Electronically signed by Radha Mae on 2/15/2022 at 7:39 PM.  [address] [facility]  [phone]       02/15/22 1830   Encounter Summary   Services provided to: Patient   Referral/Consult From: [address] [place] [address] Family members;Friends/neighbors   Continue Visiting Yes  (2/15)   Complexity of Encounter Moderate   Length of Encounter 30 minutes   Spiritual Assessment Completed Yes   Spiritual/Yazidism   Type Spiritual support   Assessment Approachable;Calm;Coping; Hopeful;Peaceful   Intervention Active listening;Explored feelings, thoughts, concerns;Prayer;Discussed illness/injury and it's impact; Discussed relationship with God   Outcome Expressed gratitude;Expressed feelings/needs/concerns;Engaged in conversation;Receptive;Encouraged

## 2022-02-16 NOTE — PROGRESS NOTES
[address] [address]  Recreational Therapy  Daily Note  [address]    Time Spent with Patient: 0 minutes    Date:  2/16/2022       Patient Name: Estephania Rodriguez      MRN: [id number]      YOB: 1945 (70 y.o.)       Gender: female  Diagnosis: spinal stenosis of lumbar region with radiculopathy  Referring Practitioner: MEGHAN Montero-MENA    Patient enjoyed spending time with our pet therapy dogs.  Pt enjoyed petting our pet therapy dog May this afternoon while sitting in her recliner-pleasant and social    Electronically signed by: TANISHA Saleh  Date: 2/16/2022

## 2022-02-16 NOTE — PLAN OF CARE
Problem: DISCHARGE BARRIERS  Goal: Patient's continuum of care needs are met  Note:   [address] [address]  Physical Medicine Case Management Assessment    [x] Inpatient Rehabilitation Unit      Patient Name: Mitch Phillips        MRN: [id number]    : 1945  (77 y.o.)  Gender: female     Date of Admission: 2022      SW assessment completed on Monday, 2022. Family/Social/Home Environment: Prior to surgery and hospitalization, patient was independent with ADL's and personal care. Patient lives alone. Patient has two sons that both live 5 to 6 miles away. Patient's sons and daughter-in-laws all work full time, but daughter-in-law, Aj, works from home and has a more flexible schedule. Patient very determined to discharge home as independent as possible and not need increased support from family. Patient was managing errands, driving, medication management, finance management, meal preparation, laundry, and housekeeping. Patient reports using a cane for mobility in the weeks leading up to surgery. Patient reports also having wonderful neighbors that check-in. Patient anticipates need for home health care services at discharge and verbalizes preference for [facility]. Social/Functional History  Lives With: Alone  Type of Home: House  Home Layout: One level  Home Access: Stairs to enter without rails  Entrance Stairs - Number of Steps: 2 steps to garage with left hand rail, 1 + 1 +1 step into home  Bathroom Shower/Tub: Tub/Shower unit  Bathroom Toilet: Handicap height  Bathroom Equipment: Grab bars in shower (reports she has access to a shower chair with a back)  Bathroom Accessibility: Accessible (patient states she will have difficulty getting a 2 w/w into the BR at home.  will have to side step or furniture walk)  Home Equipment: Rolling walker,Standard walker,Wheelchair-manual,Cane  Receives Help From: Friend(s)  ADL Assistance: [address] [place] [address] Avenue: Independent  Homemaking Responsibilities: Yes  Ambulation Assistance: Independent  Transfer Assistance: Independent  Active : Yes  Occupation: Retired  Additional Comments: Pt IND prior, family in the area to help as needed. Pt was using cane about 1-2 weeks prior to admission due to pain    Contact/Guardian Information: Teetee Dickerson, [phone]. Ruddy Brannon [place], [phone]. Community Resources Utilized: No community resources utilized prior to admission. Sexuality/Intimacy: No issues or concerns identified at time of SW assessment. Complementary Health Approaches: Patient with no current interest in complementary health approaches at time of SW assessment. Anticipated Needs/Discharge Plans: Anticipate patient would benefit from continued therapy upon discharge. SW met with patient to introduce self and explain role, complete SW assessment, and initiate discharge planning. Prior to surgery and hospitalization, patient was independent with ADL's and personal care. Patient lives alone. Patient has two sons that both live 5 to 6 miles away. Patient's sons and daughter-in-laws all work full time, but daughter-in-law, Karlene Silveira, works from home and has a more flexible schedule. Patient very determined to discharge home as independent as possible and not need increased support from family. Patient was managing errands, driving, medication management, finance management, meal preparation, laundry, and housekeeping. Patient reports using a cane for mobility in the weeks leading up to surgery. Patient reports also having wonderful neighbors that check-in. Patient anticipates need for home health care services at discharge and verbalizes preference for [facility]. Anticipate patient would benefit from continued therapy upon discharge. SW reviewed with patient team conference on Wednesday, 02/16/2022, to further discuss progress and discharge recommendations.  SW to follow and maintain involvement in discharge planning.            Discharge Planning  Living Arrangements: Alone  Support Systems: Children,Family Members  Potential Assistance Needed: Home Care  Potential Assistance Purchasing Medications: No  Type of Home Care Services: PT,OT,Nursing Services,Aide Services  Patient expects to be discharged to[de-identified] House  Expected Discharge Date:  (Undetermined)  Follow Up Appointment: Best Day/Time : Tuesday PM      Electronically signed by GALLITO Benitez on 2/16/2022 at 4:38 PM

## 2022-02-16 NOTE — DISCHARGE SUMMARY
Orthopedic Spine Discharge Summary      Patient Identification:   Renata Brar   : 1945  MRN: [id number]   Account: [de-identified]      Patient's PCP: Yolanda Weiss MD    Admit Date: 2022     Discharge Date: 2022    Admitting Physician: Denise Godoy MD     Discharge Provider: Patricia Hines PA-C , Dr. Shashank Mak    Discharge Diagnoses:  1. Lumbar spinal stenosis. 2.  Left lower extremity radiculopathy. The patient was seen and examined on day of discharge and this discharge summary is in conjunction with any daily progress note from day of discharge. Operation Performed:  1. L1, L2, L3 and L4 laminectomies and a left side L2-L3 lumbar disk excision      Hospital Course: The patient is 77 y.o. female, who presents her office had a chronic history of low back pain and predominantly left lower extremity radicular symptoms. She underwent multiple pain management injections with mild to moderate relief. She called her office noting that her pain significantly was uncontrolled for several days which cause a significant decline in her mobility and ambulation. At that time, we informed the patient to go to the emergency department for evaluation in which her symptoms were uncontrolled and we elected to admit her under our service on 2022 for possible surgical intervention. For possible surgical intervention, hospitalist team and cardiology was consulted for surgical clearance as well as to hold her anticoagulants. Hospitalist and cardiology cleared the patient but the patient needed a Plavix washout prior to undergo surgery due to increased risk of bleeding. She underwent surgical invention on 2022 in which she did have a durotomy leak which was repaired Intra-Op. She was placed on bedrest and gradually advance her activities level.     On postop day 1, 2022 she was complained of surgical pain as well as nausea with 2 episodes of vomiting in which she believes was caused by her Percocet. For this, Percocet was discontinued and Roxicodone was added which she tolerated. She continues to complain of significant muscle spasms on postop day 2 in which Flexeril was discontinued and added Valium for relief. She did have relief of her lower extremity radicular pain. Her activity level increased in which she ambulated to chair on postop day 3 in which while standing up from the chair, she developed a headache and then was placed back on bedrest.  She did have a elevated blood pressure in which internal medicine added IV hydralazine as needed which improved. There is also signs of hematuria in which internal medicine ordered a UA which did demonstrate a UTI with a culture of Pseudomonas. Antibiotics was provided and ordered by internal medicine team.  She did describe a poor nutrition intake in which a nutritionist and dietitian was consulted. She wished to attempt to undergo inpatient rehab in which they were consulted and she was a candidate. She continued to progress ambulation and continued to have relief of her left leg pain. Her Hemovac drains continued to be compressed at 50% in which on postop day 6, 2/10/2022, there is no output on drain #2 in which was removed with a new dressing change. She continued to ambulate well and was medically stable from internal medicine standpoint and was discharged and readmitted to inpatient rehab on 2/11/2022. Drain #1 remained intact in which we plan to follow during her inpatient rehab stay. Throughout her stay, her strength was maintained at 5/5 bilateral knee extension, dorsiflexion, plantarflexion and EHL contraction. Sensation was intact to light touch and calves are soft and nontender no evidence of DVT.     Consults:     IP CONSULT TO HOSPITALIST  IP CONSULT TO CARDIOLOGY  IP CONSULT TO CARDIOLOGY  IP CONSULT TO CASE MANAGEMENT  IP CONSULT TO DIETITIAN  IP CONSULT TO PHYSICAL MEDICINE REHAB  IP CONSULT TO REHAB/TCU ADMISSION COORDINATOR    Disposition:  Inpatient Rehab    Condition at Discharge: Stable    Discharge Medications:   Per Inpatient Rehab        Discharge Instructions:  No heavy lifting, bending, twisting or vigorous activity. Patient is to take frequent walks to stimulate healing of the low back and strengthening of the legs. Discontinue the use of NSAIDS for 6 weeks after drain removal. No driving until seen back in the office. Patient has a follow up with us in the office in about two weeks. At that time, we will evaluate with lumbar spine x-rays and assess clinical recovery progress. We will continue to follow patient during her stay in Inpatient Rehab for drain management.          Electronically signed by Kristin Marks PA-C on 2/16/2022

## 2022-02-16 NOTE — PROGRESS NOTES
[address] [address]  INPATIENT PHYSICAL THERAPY  DAILY NOTE  [address] [facility] - 7E-59/059-A    Time In: 0730  Time Out: 0830  Timed Code Treatment Minutes: 61 Minutes  Minutes: 60          Date: 2022  Patient Name: Annalee Greenwood,  Gender:  female        MRN: [id number]  : 1945  (77 y.o.)  Referral Date : 22  Referring Practitioner: LUANN Sarah (referring), Myrtle Paniagua MD  Diagnosis: Spinal stenosis of lumbar region with radiculopathy  Additional Pertinent Hx: Per H&P: \"Gail Thomas  is a 77 y.o. right-handed  female with a history of hypertension, GERD, hyperlipidemia, thyroid nodule, atrial fibrillation requiring WATCHMAN procedure, rheumatoid arthritis with bilateral hand fingers deformity, COPD and emphysema, diverticulosis, coronary artery disease requiring coronary arteries angioplasty and stenting, congestive heart failure, anemia, status post pacemaker placement, status post bilateral total knee arthroplasties and bilateral total hip arthroplasty, status post multiple bilateral feet surgeries for deformity, status post lumbar spine surgeries in  and , status post appendectomy, hysterectomy, hemorrhoidectomy, is admitted to the inpatient rehabilitation unit on 2022 for intensive inpatient rehabilitation treatment of impaired ADLs and ambulation secondary to lumbar spinal stenosis with left lower extremity radiculopathy requiring lumbar laminectomy and disc excision on 2022 by Dr. Joann Fierro. \"  Pt transferred to inpatient rehab 22.      Prior Level of Function:  Lives With: Alone  Type of Home: House  Home Layout: One level  Home Access: Stairs to enter without rails  Entrance Stairs - Number of Steps: 2 steps to garage with left hand rail, 1 + 1 +1 step into home  Home Equipment: Rolling walker,Standard walker,Wheelchair-manual,Cane   Bathroom Shower/Tub: Tub/Shower unit  Bathroom Toilet: Handicap height  Bathroom **occasional cues for hand placement    Ambulation:  Stand By Assistance  Distance: ~250ft x2  Surface: Level Tile  Device:Rolling Walker  Gait Deviations: Forward Flexed Posture, Slow Tianna, Decreased Step Length Bilaterally, Decreased Gait Speed, Mild Path Deviations and steady, no LOB, cues to keep walker closer to body     Stairs:  Supervision  Number of Steps: 1 (porch step)  Height: 6\" step with Rolling Walker    Exercise:  Patient was guided in 1 set(s) 15 reps of exercise to both lower extremities. Seated marches, Seated hamstring curls, Seated heel/toe raises, Long arc quads, Seated isometric hip adduction, Standing heel/toe raises, Standing marches, Standing hip abduction/adduction, Standing hamstring curls, Standing hip flexion and Mini squats with 2# ankle weights applied. Exercises were completed for increased independence with functional mobility. Functional Outcome Measures: Not completed       ASSESSMENT:  Assessment: Patient progressing toward established goals. Activity Tolerance:  Patient tolerance of  treatment: good.       Equipment Recommendations:Equipment Needed: No  Discharge Recommendations: Continue to assess pending progress, Home with Home Health PT and Patient would benefit from continued PT at discharge  Plan: Times per week: 5x/wk 90min, 1x/wk 30min  Times per day: Daily  Plan weeks: 2  Current Treatment Recommendations: Shira Delgado Re-education,Patient/Caregiver Education & Training,Balance Training,Gait Training,Home Exercise Program,Functional Mobility Training,Stair training,Safety Education & Training    Patient Education  Patient Education: Plan of Care, Precautions/Restrictions, [employer], Equipment Education, Transfers, Reviewed Prior Education, Gait, Stairs, Up in Chair for Vanda Walsh, Verbal Exercise Instruction    Goals:  Patient goals : \"be able to be self sufficient\"  Short term goals  Time Frame for Short term goals: 1 week  Short term goal 1: Patient will complete supine < > sit with SBA to transfer in/out of bed with decreased difficulty. Short term goal 2: Patient will complete sit < > stand with SBA with a RW to stand to ambulate with decreased difficulty. MET, SEE LTG  Short term goal 3: Patient will ambulate Deandre Viri 1560' with a RW and SBA to progress towards navigating home safely. MET, SEE LTG  Short term goal 4: Patient will ascend/descend 2, 6\" step with a RW with CGA to progress towards safe home entry. MET, SEE LTG  Short term goal 5: Patient will improve tinetti to greater than or equal to 19/28 to reduce her risk for falls. MET, SEE LTG  Long term goals  Time Frame for Long term goals : 2 weeks  Long term goal 1: Patient will complete supine  < > sit with modified independence to transfer in/out of bed safely. Long term goal 2: Patient will sit < > stand with modified independence to stand to ambulate safely. Long term goal 3: Patient will ambulate 80' with a RW and modified independence to navigate home safely. Long term goal 4: Patient will ascend/descend 3, 6\" steps with a RW and modified independence for safe home access. Long term goal 5: Patient will ascend/descend 2, 6\" steps with a hand rail and modified independence for garage entry. Long term goal 6: Patient will complete car transfer with modified independence to transfer in/out of vehicle safely. Long term goal 7: Patient will improve tinetti to greater than or equal to 24/28 to reduce her risk for falls    Following session, patient left in safe position with all fall risk precautions in place.

## 2022-02-16 NOTE — PROGRESS NOTES
Patient: Sebastian Huertas  Unit/Bed: 6D-68/938-D  YOB: 1945  MRN: [id number] Acct: [de-identified]   Admitting Diagnosis: Cerebrospinal fluid leak due to lumbar surgery [G97.82, G96.00]  Admit Date:  2/11/2022  Hospital Day: 5    Assessment:     Principal Problem:    Spinal stenosis of lumbar region with radiculopathy  Active Problems:    Hyperlipidemia    HTN (hypertension)    Rheumatoid arthritis ([facility] [address]    Coronary artery disease involving native coronary artery of native heart without angina pectoris    Cerebrospinal fluid leak due to lumbar surgery    Atrial fibrillation (HCC)    Anemia    [place] deformity of finger of both hands    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 *      Plan:     Continue to follow        Subjective:     Patient has no complaint of CP or SOB. .   Medication side effects: none    Scheduled Meds:   cetirizine  10 mg Oral Daily    polyethylene glycol  17 g Oral Daily    bisacodyl  5 mg Oral Daily    sennosides-docusate sodium  1 tablet Oral BID    atorvastatin  10 mg Oral Nightly    ciprofloxacin  500 mg Oral 2 times per day    clopidogrel  75 mg Oral QAM    digoxin  125 mcg Oral Daily    magnesium oxide  250 mg Oral Daily    [START ON 2/18/2022] methotrexate  22.5 mg Oral Weekly    metoprolol tartrate  37.5 mg Oral BID    naloxegol  12.5 mg Oral QAM    pantoprazole  40 mg Oral QAM AC     Continuous Infusions:  PRN Meds:HYDROcodone 5 mg - acetaminophen **OR** [DISCONTINUED] HYDROcodone 5 mg - acetaminophen, acetaminophen, bisacodyl, magnesium hydroxide, diazePAM, magic (miracle) mouthwash, nitroGLYCERIN, ondansetron, melatonin    Review of Systems  Pertinent items are noted in HPI.     Objective: Patient Vitals for the past 8 hrs:   BP Temp Temp src Pulse Resp SpO2   02/16/22 1215 (!) 81/48   66 16    02/16/22 0950 (!) 111/53 98 °F (36.7 °C) Oral 68 16 96 %     I/O last 3 completed shifts:   In: 100 [P.O.:100]  Out: -   I/O this shift:  In: 220 [P.O.:220]  Out: -     BP (!) 81/48   Pulse 66   Temp 98 °F (36.7 °C) (Oral)   Resp 16   Ht 5' 2.01\" (1.575 m)   Wt 118 lb 11.2 oz (53.8 kg)   SpO2 96%   BMI 21.70 kg/m²     General appearance: alert, appears stated age and cooperative  Head: Normocephalic, without obvious abnormality, atraumatic  Lungs: clear to auscultation bilaterally  Heart: regular rate and rhythm, S1, S2 normal, no murmur, click, rub or gallop  Abdomen: soft, non-tender; bowel sounds normal; no masses,  no organomegaly  Extremities: extremities normal, atraumatic, no cyanosis or edema  Skin: Skin color, texture, turgor normal. No rashes or lesions  Neurologic: weak    Electronically signed by Robyn Dawn MD on 2/16/2022 at 12:41 PM

## 2022-02-16 NOTE — PLAN OF CARE
Problem: DISCHARGE BARRIERS  Goal: Patient's continuum of care needs are met  2/16/2022 1641 by GALLITO Carnes  Note: Team conference held Wednesday, 02/16/2022. Recommendations of the team were explained to the patient by GALLITO Valencia, and Dr. Halie Alston. Team is recommending that patient continue on acute inpatient rehab for one more week, with expected discharge date of Wednesday, 02/23/2022. Prior to discharge, team is recommending family education. Following discharge, team is recommending home health care services for RN/PT/OT/HHA. Patient indicates preference for [facility]. SW discussed recommendation for family to stay with patient for one night at discharge. Care plan reviewed with patient. Patient verbalized understanding of the plan of care and contributed to goal setting. SW to follow and maintain involvement in discharge planning.

## 2022-02-16 NOTE — PROGRESS NOTES
Physical Medicine & Rehabilitation   Progress Note    Chief Complaint:  Lumbar stenosis. S/p lumbar espinoza with CSF leak. Rehab needs    Subjective:  Patient seen on rounds with MALLORY Hurley LSW, following patient's inpatient Rehab Team Conference. We updated her re her planned discharge from the inpatient rehabilitation unit to home which is scheduled for Wednesday, 2/23/22, with [address] to include Physical Therapy, Occupational Therapy, Nursing and HHA 2x week. She denied headache, chest pain, shortness of breath, nausea, and vomiting. She has been sleeping well at night and her bowels have been moving. She is very excited about her progress in therapy so far, and believes she will be ready for discharge to home by next week. Rehabilitation:  PT: reviewed during team conference  OT: reviewed during team conference      Review of Systems:  CONSTITUTIONAL:  positive for  fatigue  EYES:  negative  HEENT:  negative  RESPIRATORY:  negative  CARDIOVASCULAR:  negative  GASTROINTESTINAL:  Negative  GENITOURINARY:  negative  SKIN:  Lumbar incision  HEMATOLOGIC/LYMPHATIC:  negative  MUSCULOSKELETAL:  positive for pain and muscle weakness, Rheumatoid arthritis  NEUROLOGICAL:  positive for headaches, gait problems, weakness and pain  BEHAVIOR/PSYCH:  negative  System review otherwise negative      Objective:  Vitals:    02/16/22 1215   BP: (!) 81/48   Pulse: 66   Resp: 16   Temp:    SpO2:    Awake but groggy  Orientation:   person, place, time  Mood: within normal limits  Affect: calm  General appearance: Patient is well nourished, well developed, well groomed and in no acute distress, appearing stated age     Memory:   normal,   Attention/Concentration: normal  Language:  normal     Cranial Nerves:  cranial nerves II-XII are grossly intact  ROM:  abnormal - limited lumbar  Tone:  normal  Muscle bulk: within normal limits  Sensory:  Sensory intact    Skin: warm and dry, no rash or erythema. Lumbar incision not visualized. Peripheral vascular: Pulses: Normal upper and lower extremity pulses; Edema: trace      Diagnostics:   No results found for this or any previous visit (from the past 24 hour(s)). Impression:  · Lumbar stenosis  ? L1-L4 laminectomies and a left side L2-L3 lumbar disk excision, CSF leak. On 2/4/22 with Dr Evan Thompson  · Lumbar pain with radiculopathy - improved post op  · Left leg weakness  · Atrial septal defect   · Sick sinus syndrome with pacemaker  · PAF  · CAD with hx stent  · HFpEF  · Rheumatoid arthritis  · COPD  · HTN  · HLD  · GERD      Plan:  Continue current therapies  Prophylaxis: DVT - SCDs, JOEL hose, early ambulation, GI - Protonix  Pain: Norco 5/325mg every 4 hours PRN, tylenol  Bowels: dulcolax, MOM, Movantik, Miralax, senokot  Bladder: per Rehab nursing  RA: methotrexate weekly  Sleep: melatonin 3mg nightly PRN  Zyrtec for itching - avoiding benadryl due to drowsiness. · Lumbar drain removed 2/14/22  · Team conference Wednesday   · Discharge from the IPR Unit to home is scheduled for Wednesday, 2/23/22, with Home Health Services to include Physical Therapy, Occupational Therapy, Nursing and HHA 2x week.       Missed Therapy Time:  · None    Ciera Posada MD

## 2022-02-16 NOTE — PROGRESS NOTES
[address] [address]  INPATIENT PHYSICAL THERAPY  DAILY NOTE  [facility]    Time In: 1330  Time Out: 1400  Timed Code Treatment Minutes: 30 Minutes  Minutes: 30          Date: 2022  Patient Name: Mitch Phillips,  Gender:  female        MRN: [id number]  : 1945  (77 y.o.)  Referral Date : 22  Referring Practitioner: LUANN Lamb (referring), Noman Gill MD  Diagnosis: Spinal stenosis of lumbar region with radiculopathy  Additional Pertinent Hx: Per H&P: \"Lulu Charma Romberg  is a 77 y.o. right-handed  female with a history of hypertension, GERD, hyperlipidemia, thyroid nodule, atrial fibrillation requiring WATCHMAN procedure, rheumatoid arthritis with bilateral hand fingers deformity, COPD and emphysema, diverticulosis, coronary artery disease requiring coronary arteries angioplasty and stenting, congestive heart failure, anemia, status post pacemaker placement, status post bilateral total knee arthroplasties and bilateral total hip arthroplasty, status post multiple bilateral feet surgeries for deformity, status post lumbar spine surgeries in  and , status post appendectomy, hysterectomy, hemorrhoidectomy, is admitted to the inpatient rehabilitation unit on 2022 for intensive inpatient rehabilitation treatment of impaired ADLs and ambulation secondary to lumbar spinal stenosis with left lower extremity radiculopathy requiring lumbar laminectomy and disc excision on 2022 by Dr. Tanika Juarez. \"  Pt transferred to inpatient rehab 22.      Prior Level of Function:  Lives With: Alone  Type of Home: House  Home Layout: One level  Home Access: Stairs to enter without rails  Entrance Stairs - Number of Steps: 2 steps to garage with left hand rail, 1 + 1 +1 step into home  Home Equipment: Rolling walker,Standard walker,Wheelchair-manual,Cane   Bathroom Shower/Tub: Tub/Shower unit  Bathroom Toilet: Handicap height  Bathroom Equipment: Grab bars in shower (reports she has access to a shower chair with a back)  Bathroom Accessibility: Accessible (patient states she will have difficulty getting a 2 w/w into the BR at home. will have to side step or furniture walk)    Receives Help From: Friend(s)  ADL Assistance: [address]: Independent  Homemaking Responsibilities: Yes  Ambulation Assistance: Independent  Transfer Assistance: Independent  Active : Yes  Additional Comments: Pt IND prior, family in the area to help as needed. Pt was using cane about 1-2 weeks prior to admission due to pain    Restrictions/Precautions:  Restrictions/Precautions: General Precautions,Fall Risk,Surgical Protocols  Required Braces or Orthoses  Spinal: Lumbar Corset  Position Activity Restriction  Spinal Precautions: No Bending,No Lifting,No Twisting  Other position/activity restrictions: L1-L4 Laminectomy with complication (durotomy) on 2/4     SUBJECTIVE: Patient in bedside chair upon arrival and agreeable to therapy. PAIN: denies    Vitals: Vitals not assessed per clinical judgement, see nursing flowsheet    OBJECTIVE:  Bed Mobility:  Not Tested    Transfers:  Sit to Stand: Stand By Assistance  Stand to Sit:Stand By Assistance    Ambulation:  Stand By Assistance  Distance: 180ft, 200ft  Surface: Level Tile  Device:Rolling Walker  Gait Deviations: Forward Flexed Posture, Slow Tianna, Decreased Step Length Bilaterally, Decreased Gait Speed and steady, no LOB    Stairs:  Supervision  Number of Steps: 1 (porch step)  Height: 6\" step with Rolling Walker    Balance:  Dynamic Standing Balance: Stand By Assistance, stood using reacher ~4min without UE support    Exercise:  None this session. Functional Outcome Measures: Not completed       ASSESSMENT:  Assessment: Patient progressing toward established goals. Activity Tolerance:  Patient tolerance of  treatment: good.       Equipment Recommendations:Equipment Needed: No  Discharge Recommendations: Continue to assess pending progress and Home with Home Health PT  Plan: Times per week: 5x/wk 90min, 1x/wk 30min  Times per day: Daily  Plan weeks: 2  Current Treatment Recommendations: Malinda Burgos Re-education,Patient/Caregiver Education & Training,Balance Training,Gait Training,Home Exercise Program,Functional Mobility Training,Stair training,Safety Education & Training    Patient Education  Patient Education: Plan of Care, Precautions/Restrictions, Transfers, Gait, Stairs    Goals:  Patient goals : \"be able to be self sufficient\"  Short term goals  Time Frame for Short term goals: 1 week  Short term goal 1: Patient will complete supine < > sit with SBA to transfer in/out of bed with decreased difficulty. Short term goal 2: Patient will complete sit < > stand with SBA with a RW to stand to ambulate with decreased difficulty. MET, SEE LTG  Short term goal 3: Patient will ambulate [address] 1560' with a RW and SBA to progress towards navigating home safely. MET, SEE LTG  Short term goal 4: Patient will ascend/descend 2, 6\" step with a RW with CGA to progress towards safe home entry. MET, SEE LTG  Short term goal 5: Patient will improve tinetti to greater than or equal to 19/28 to reduce her risk for falls. MET, SEE LTG  Long term goals  Time Frame for Long term goals : 2 weeks  Long term goal 1: Patient will complete supine  < > sit with modified independence to transfer in/out of bed safely. Long term goal 2: Patient will sit < > stand with modified independence to stand to ambulate safely. Long term goal 3: Patient will ambulate 80' with a RW and modified independence to navigate home safely. Long term goal 4: Patient will ascend/descend 3, 6\" steps with a RW and modified independence for safe home access. Long term goal 5: Patient will ascend/descend 2, 6\" steps with a hand rail and modified independence for garage entry.   Long term goal 6: Patient will complete car transfer with modified independence to transfer in/out of vehicle safely. Long term goal 7: Patient will improve tinetti to greater than or equal to 24/28 to reduce her risk for falls    Following session, patient left in safe position with all fall risk precautions in place.

## 2022-02-17 PROCEDURE — 99231 SBSQ HOSP IP/OBS SF/LOW 25: CPT | Performed by: NURSE PRACTITIONER

## 2022-02-17 PROCEDURE — 1180000000 HC REHAB R&B

## 2022-02-17 PROCEDURE — 6370000000 HC RX 637 (ALT 250 FOR IP): Performed by: NURSE PRACTITIONER

## 2022-02-17 PROCEDURE — 97530 THERAPEUTIC ACTIVITIES: CPT

## 2022-02-17 PROCEDURE — 6370000000 HC RX 637 (ALT 250 FOR IP): Performed by: PHYSICAL MEDICINE & REHABILITATION

## 2022-02-17 PROCEDURE — 97116 GAIT TRAINING THERAPY: CPT

## 2022-02-17 PROCEDURE — 97535 SELF CARE MNGMENT TRAINING: CPT

## 2022-02-17 PROCEDURE — 97110 THERAPEUTIC EXERCISES: CPT

## 2022-02-17 RX ORDER — TRAMADOL HYDROCHLORIDE 50 MG/1
25 TABLET ORAL EVERY 6 HOURS PRN
Status: DISCONTINUED | OUTPATIENT
Start: 2022-02-17 | End: 2022-02-18

## 2022-02-17 RX ADMIN — ACETAMINOPHEN 650 MG: 325 TABLET ORAL at 08:40

## 2022-02-17 RX ADMIN — METOPROLOL TARTRATE 37.5 MG: 25 TABLET, FILM COATED ORAL at 08:41

## 2022-02-17 RX ADMIN — Medication 400 MCG: at 21:04

## 2022-02-17 RX ADMIN — Medication 1 EACH: at 08:48

## 2022-02-17 RX ADMIN — Medication 1 EACH: at 21:05

## 2022-02-17 RX ADMIN — DIGOXIN 125 MCG: 125 TABLET ORAL at 08:41

## 2022-02-17 RX ADMIN — BISACODYL 5 MG: 5 TABLET ORAL at 08:47

## 2022-02-17 RX ADMIN — ACETAMINOPHEN 650 MG: 325 TABLET ORAL at 04:03

## 2022-02-17 RX ADMIN — ATORVASTATIN CALCIUM 10 MG: 10 TABLET, FILM COATED ORAL at 21:00

## 2022-02-17 RX ADMIN — Medication 3 MG: at 21:00

## 2022-02-17 RX ADMIN — PANTOPRAZOLE SODIUM 40 MG: 40 TABLET, DELAYED RELEASE ORAL at 06:23

## 2022-02-17 RX ADMIN — CETIRIZINE HYDROCHLORIDE 10 MG: 10 TABLET, FILM COATED ORAL at 08:40

## 2022-02-17 RX ADMIN — TRAMADOL HYDROCHLORIDE 25 MG: 50 TABLET, COATED ORAL at 20:59

## 2022-02-17 RX ADMIN — Medication 2 EACH: at 08:48

## 2022-02-17 RX ADMIN — CLOPIDOGREL BISULFATE 75 MG: 75 TABLET, FILM COATED ORAL at 08:48

## 2022-02-17 RX ADMIN — NALOXEGOL OXALATE 12.5 MG: 12.5 TABLET, FILM COATED ORAL at 08:40

## 2022-02-17 RX ADMIN — METOPROLOL TARTRATE 37.5 MG: 25 TABLET, FILM COATED ORAL at 21:01

## 2022-02-17 ASSESSMENT — PAIN SCALES - GENERAL
PAINLEVEL_OUTOF10: 4
PAINLEVEL_OUTOF10: 5
PAINLEVEL_OUTOF10: 4

## 2022-02-17 NOTE — PROGRESS NOTES
[facility]  INPATIENT PHYSICAL THERAPY  DAILY NOTE  [address] [facility] - [id number]/[id number]    Time In: 0730  Time Out: 0830  Timed Code Treatment Minutes: 61 Minutes  Minutes: 60          Date: 2022  Patient Name: Imani Zhong,  Gender:  female        MRN: [id number]  : 1945  (77 y.o.)  Referral Date : 22  Referring Practitioner: LUANN Livingston (referring), Ciera Posada MD  Diagnosis: Spinal stenosis of lumbar region with radiculopathy  Additional Pertinent Hx: Per H&P: \"Gail Malik  is a 77 y.o. right-handed  female with a history of hypertension, GERD, hyperlipidemia, thyroid nodule, atrial fibrillation requiring WATCHMAN procedure, rheumatoid arthritis with bilateral hand fingers deformity, COPD and emphysema, diverticulosis, coronary artery disease requiring coronary arteries angioplasty and stenting, congestive heart failure, anemia, status post pacemaker placement, status post bilateral total knee arthroplasties and bilateral total hip arthroplasty, status post multiple bilateral feet surgeries for deformity, status post lumbar spine surgeries in  and , status post appendectomy, hysterectomy, hemorrhoidectomy, is admitted to the inpatient rehabilitation unit on 2022 for intensive inpatient rehabilitation treatment of impaired ADLs and ambulation secondary to lumbar spinal stenosis with left lower extremity radiculopathy requiring lumbar laminectomy and disc excision on 2022 by Dr. Evan Thompson. \"  Pt transferred to inpatient rehab 22.      Prior Level of Function:  Lives With: Alone  Type of Home: House  Home Layout: One level  Home Access: Stairs to enter without rails  Entrance Stairs - Number of Steps: 2 steps to garage with left hand rail, 1 + 1 +1 step into home  Home Equipment: Rolling walker,Standard walker,Wheelchair-manual,Cane   Bathroom Shower/Tub: Tub/Shower unit  Bathroom Toilet: Handicap height  Bathroom Equipment: Grab bars in shower (reports she has access to a shower chair with a back)  Bathroom Accessibility: Accessible (patient states she will have difficulty getting a 2 w/w into the BR at home. will have to side step or furniture walk)    Receives Help From: Friend(s)  ADL Assistance: [address]: Independent  Homemaking Responsibilities: Yes  Ambulation Assistance: Independent  Transfer Assistance: Independent  Active : Yes  Additional Comments: Pt IND prior, family in the area to help as needed. Pt was using cane about 1-2 weeks prior to admission due to pain    Restrictions/Precautions:  Restrictions/Precautions: General Precautions,Fall Risk,Surgical Protocols  Required Braces or Orthoses  Spinal: Lumbar Corset  Position Activity Restriction  Spinal Precautions: No Bending,No Lifting,No Twisting  Other position/activity restrictions: L1-L4 Laminectomy with complication (durotomy) on 2/4     SUBJECTIVE: Patient was in recliner upon arrival. Baylee Kaba agreed and was compliant with therapy. PAIN: No pain was noted. Vitals: Vitals not assessed per clinical judgement, see nursing flowsheet    OBJECTIVE:  Bed Mobility:  Rolling to Left: Stand By Assistance   Supine to Sit: Minimal Assistance  Sit to Supine: Contact Guard Assistance    - Pt needed assistance to manage trunk going into sitting edge of bed. Transfers:  Sit to Stand: Stand By Assistance  Stand to Sit:Stand By Assistance    Ambulation:  Stand By Assistance  Distance: 202 feet, 58 feet   Surface: Level Tile  Device:Rolling Walker  Gait Deviations: Forward Flexed Posture, Slow Tianna, Decreased Step Length Bilaterally, Decreased Weight Shift Bilaterally, Decreased Heel Strike Bilaterally, Narrow Base of Support, Mild Path Deviations and Unsteady Gait    Exercise:  Patient was guided in 1 set(s) 10-15 reps of exercise to BOTH lower extremities.   Ankle pumps, Glut sets, Heelslides, Short arc quads, Hip abduction/adduction, Seated marches, Seated heel/toe raises and isometric abdominals . Exercises were completed for increased independence with functional mobility. Stairs:  Stairs:  6\" steps ups x5-10 using BLE. Patient needed a rest break in between before switching LE. Gail needed CGA for safety. Pt performs x10 step ups on the RLE without any increase in pain or weakness. Pt perofrms x5 step ups on LLE. Pt describes more pain and increase weakness on L side while peforming. Functional Outcome Measures: Not completed       ASSESSMENT:  Assessment: Patient progressing toward established goals. Activity Tolerance:  Patient tolerance of  treatment: good. Equipment Recommendations:Equipment Needed: No  Discharge Recommendations: Continue to assess pending progress  Plan: Times per week: 5x/wk 90min, 1x/wk 30min  Times per day: Daily  Plan weeks: 2  Current Treatment Recommendations: [facility] Re-education,Patient/Caregiver Education & Training,Balance Training,Gait Training,Home Exercise Program,Functional Mobility Training,Stair training,Safety Education & Training    Patient Education  Patient Education: Precautions/Restrictions, [employer], Gait, Stairs, Verbal Exercise Instruction    Goals:  Patient goals : \"be able to be self sufficient\"  Short term goals  Time Frame for Short term goals: 1 week  Short term goal 1: Patient will complete supine < > sit with SBA to transfer in/out of bed with decreased difficulty. Short term goal 2: Patient will complete sit < > stand with SBA with a RW to stand to ambulate with decreased difficulty. MET, SEE LTG  Short term goal 3: Patient will ambulate 80' with a RW and SBA to progress towards navigating home safely. MET, SEE LTG  Short term goal 4: Patient will ascend/descend 2, 6\" step with a RW with CGA to progress towards safe home entry.  MET, SEE LTG  Short term goal 5: Patient will improve tinetti to greater than or equal to 19/28 to reduce her risk for falls. MET, SEE LTG  Long term goals  Time Frame for Long term goals : 2 weeks  Long term goal 1: Patient will complete supine  < > sit with modified independence to transfer in/out of bed safely. Long term goal 2: Patient will sit < > stand with modified independence to stand to ambulate safely. Long term goal 3: Patient will ambulate 80' with a RW and modified independence to navigate home safely. Long term goal 4: Patient will ascend/descend 3, 6\" steps with a RW and modified independence for safe home access. Long term goal 5: Patient will ascend/descend 2, 6\" steps with a hand rail and modified independence for garage entry. Long term goal 6: Patient will complete car transfer with modified independence to transfer in/out of vehicle safely. Long term goal 7: Patient will improve tinetti to greater than or equal to 24/28 to reduce her risk for falls    Following session, patient left in safe position with all fall risk precautions in place.     Treatment session and note completed by PEACE Vyas under supervision of signing therapist.

## 2022-02-17 NOTE — PROGRESS NOTES
[address] [address]  Recreational Therapy  Daily Note  [address]    Time Spent with Patient: 10 minutes    Date:  2/17/2022       Patient Name: Luisito Rasheed      MRN: [id number]      YOB: 1945 (70 y.o.)       Gender: female  Diagnosis: spinal stenosis of lumbar region with radiculopathy  Referring Practitioner: LUANN Bowman    RESTRICTIONS/PRECAUTIONS:  Restrictions/Precautions: General Precautions,Fall Risk,Surgical Protocols  Vision: Impaired  Hearing: Exceptions to [facility]  Hearing Exceptions: Hard of hearing/hearing concerns    PAIN: 0-no c/o pain     SUBJECTIVE:  That might pass some time     OBJECTIVE:  Pt agreed to have a jigsaw puzzle and card table brought into her room to work on in her free time especially in the evenings and the weekend coming up-affect bright and social-has so many good things to say about her stay here and the assistance/ therapy  she is receiving          Patient Education  New Education Provided: Importance of Leisure,     Electronically signed by: TANISHA Ramírez  Date: 2/17/2022
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
## 2022-02-17 NOTE — PROGRESS NOTES
Physical Medicine & Rehabilitation   Progress Note    Chief Complaint:  Lumbar stenosis. S/p lumbar espinoza with CSF leak. Rehab needs    Subjective:  Patient seen today, up in bathroom, preparing to walk in hallway. Patient asking about something less strong for pain, doesn't want the strong meds, but tylenol not as helpful. Discussed the Oxy she was on and then the Norco and added tramadol today. Patient understanding. Planned discharge from the inpatient rehabilitation unit to home which is scheduled for Wednesday, 2/23/22, with [address] to include Physical Therapy, Occupational Therapy, Nursing and HHA 2x week. Rehabilitation:  PT:   Transfers:  Sit to Stand: Stand By Assistance  Stand to Sit:Stand By Assistance  Ambulation:  Stand By Assistance  Distance: 180ft, 200ft  Surface: Level Tile  Device:Rolling Walker  Gait Deviations: Forward Flexed Posture, Slow Tianna, Decreased Step Length Bilaterally, Decreased Gait Speed and steady, no LOB  Stairs:  Supervision  Number of Steps: 1 (porch step)  Height: 6\" step with Rolling Walker  Balance:  Dynamic Standing Balance: Stand By Assistance, stood using reacher ~4min without UE support    OT:   ADL:   Lower Extremity Dressing: Maximum Assistance. for donning B shoes (and tying) Pt reports she has slip on shoes that are not as tight at home  Toileting: Moderate Assistance. A for completeness of cleaning bottom after BM (educated Pt on toilet tongs vs toilet aide & left handout in room for Pt to decide with family)  Toilet Transfer: Stand By Assistance. with ETS.  **completed toileting x 2 during session  BALANCE:  Standing Balance: Stand By Assistance. TRANSFERS:  Sit to Stand:  Contact Guard Assistance from recliners, armed chair, w/c; needed min A for sit-stand from kitchen chair without arms (discussed possibly having family move an armed chair to her table)  Stand to Sit: [employer].     FUNCTIONAL MOBILITY:  Assistive Device: Rolling Walker  Assist Level:  Stand By Assistance. Distance: To and from bathroom and To and from therapy apartment   Up/down porch step with CGA & min vcs for technique  ADDITIONAL ACTIVITIES:  Completed task of putting clothing in washer-educated Pt on technique for maintaining precautions (having detergent left station area so she is not having to move, using reacher to get items out of washer). Stood x 4 min during activity. Discussed home safety with removal of rugs & making sure areas are wide enough for walker use. Pt reports bathroom is very small & she will have to side step into bathroom with her RW. Also discussed keeping often used pans on countertop to prevent her from having to get into low cabinets. Completed task of retrieving items in [address] grocery store with SBA while pushing grocery cart x 7 min. Education on reaching items without breaking precautions.      Review of Systems:  CONSTITUTIONAL:  positive for  fatigue  EYES:  negative  HEENT:  negative  RESPIRATORY:  negative  CARDIOVASCULAR:  negative  GASTROINTESTINAL:  Negative  GENITOURINARY:  negative  SKIN:  Lumbar incision  HEMATOLOGIC/LYMPHATIC:  negative  MUSCULOSKELETAL:  positive for pain and muscle weakness, Rheumatoid arthritis  NEUROLOGICAL:  positive for headaches, gait problems, weakness and pain  BEHAVIOR/PSYCH:  negative  System review otherwise negative      Objective:  Vitals:    02/17/22 0840   BP: (!) 143/68   Pulse: 74   Resp: 14   Temp: 97.8 °F (36.6 °C)   SpO2: 97%     Awake  Orientation:   person, place, time  Mood: within normal limits  Affect: calm  General appearance: Patient is well nourished, well developed, well groomed and in no acute distress, appearing stated age     Memory:   normal,   Attention/Concentration: normal  Language:  normal     Cranial Nerves:  cranial nerves II-XII are grossly intact  ROM:  abnormal - limited lumbar  Tone:  normal  Muscle bulk: within normal limits  Sensory: Sensory intact    Skin: warm and dry, no rash or erythema. Lumbar incision not visualized. Peripheral vascular: Pulses: Normal upper and lower extremity pulses; Edema: trace      Diagnostics:   No results found for this or any previous visit (from the past 24 hour(s)). Impression:  · Lumbar stenosis  ? L1-L4 laminectomies and a left side L2-L3 lumbar disk excision, CSF leak. On 22 with Dr Paulina oRman  · Lumbar pain with radiculopathy - improved post op  · Left leg weakness  · Atrial septal defect   · Sick sinus syndrome with pacemaker  · PAF  · CAD with hx stent  · HFpEF  · Rheumatoid arthritis  · COPD  · HTN  · HLD  · GERD      Plan:  Continue current therapies  Prophylaxis: DVT - SCDs, JOEL hose, early ambulation, GI - Protonix  Pain: Norco 5/325mg every 4 hours PRN, tylenol  Bowels: dulcolax, MOM, Movantik, Miralax, senokot  Bladder: per Rehab nursing  RA: methotrexate weekly  Sleep: melatonin 3mg nightly PRN  Zyrtec for itching - avoiding benadryl due to drowsiness.    · Lumbar drain removed 22  · Team conference Wednesday   · Discharge plannin22, HH- PT, OT, RN, HHA       Missed Therapy Time:  · None    MEGHAN Hardin - CNP

## 2022-02-17 NOTE — PLAN OF CARE
Problem: Nutrition  Goal: Optimal nutrition therapy  2/17/2022 1509 by Gabriel Keenan RD  Outcome: Ongoing  2/17/2022 0946 by Neta Galeazzi, RN  Outcome: Ongoing     Nutrition Problem #1: Severe malnutrition  Intervention: Food and/or Nutrient Delivery: Continue Current Diet,Continue Oral Nutrition Supplement,Mineral Supplement,Vitamin Supplement  Nutritional Goals: Patient to meet 75% or more at meals daily throughout LOS'    See full note filed 2/17 at 3:08PM.

## 2022-02-17 NOTE — PROGRESS NOTES
[address]  Occupational Therapy  Daily Note    Time In: 0696  Time Out: 1200  Timed Code Treatment Minutes: 90 Minutes  Minutes: 90          Date: 2022  Patient Name: Monika Hutton,   Gender: female      Room: [facility]/200-A  MRN: [id number]  : 1945  (77 y.o.)  Referring Practitioner: LUANN Yin  Diagnosis: spinal stenosis of lumbar region with radiculopathy  Additional Pertinent Hx: Per EMR \"Gail is a 75y/o female known to our office having been seen in the past several months with complaints of low back pain and leg pain. She has been through multiple epidural injections, most recently last month, with some mild-moderate relief of her pain. Over the past several days her pain had been worsening significantly to the point it was completely uncontrolled and her mobility had declined significantly. She called our office and the only recommendation was for her to present to the ED for evaluation and admission for further care. She does have a history of previous lumbar spine surgery roughly 20yrs ago. History of pacemaker, afib and is on plavix as well. She describes a sharp pain traveling from the low back into the left lateral/anterior thigh are. No new changes in bowel/bladder continence. \" L1-L4 Laminectomy with complication (durotomy) on     Restrictions/Precautions:  Restrictions/Precautions: General Precautions,Fall Risk,Surgical Protocols  Required Braces or Orthoses  Spinal: Lumbar Corset  Position Activity Restriction  Spinal Precautions: No Bending,No Lifting,No Twisting  Other position/activity restrictions: L1-L4 Laminectomy with complication (durotomy) on        SUBJECTIVE: Pt. Pleasant throughout entire session. Pt. Very cautious of spinal precautions throughout. PAIN: Pt. Denies pain throughout.     Vitals: Vitals not assessed per clinical judgement, see nursing flowsheet    COGNITION: Decreased Safety Awareness    ADL:   Feeding: Independent. for beverage managementwith beverage management  Grooming: Supervision. sinkside to wash hands x2 trials during session. No twisting noted throughout. Toileting: Supervision. x2 trials   Toilet Transfer: Supervision. x2 trials. Pt. Demo good safety throughout ADLs. Pt. Able to don/doff brace with set up assistance with good carryover of technique. BALANCE:  Sitting Balance:  Independent. Standing Balance: Supervision. BED MOBILITY:  Not Tested    TRANSFERS:  Sit to Stand:  Supervision. Stand to Sit: Supervision    FUNCTIONAL MOBILITY:  Assistive Device: Rolling Walker  Assist Level:  SBA  Distance: To and from bathroom and to/from easy street an within easy street      ADDITIONAL ACTIVITIES:  Pt. Engaged in front porch management with walker in [employer] apartment with education to turn entire body during door management to abide by spinal precautions, pt completed with SBA. Pt. Questioning about a walker tray for walker, OTR provided visual reference of the various types. Pt. Receptive to suggestion and reports her family will assist in ordering her a walker tray. OTR provided pt with walker bag on front of walker to enable safe item retrieval/transport technique. Pt. Completed leisure task of gardening while standing to complete planting task, pt able to complete with good adherence to spinal precautions to plant and water plant. Pt. Able to clean up table top and task with Supervision. Pt. Able to stand up to 10 min with 0 signs fatigue, Supervision with B hand release. ASSESSMENT:     Activity Tolerance:  Patient tolerance of  treatment: good. Discharge Recommendations: Continue to assess pending progress  Equipment Recommendations: Equipment Needed: Yes  Mobility Devices: ADL Assistive Devices  Other: patient would be interested in 2 w/w tray if discharged with a 2 w/w for functional mobility. Pt.  Reports she does have a shower chair with a back available if needed. May benefit from long handled reacher, dressing stick and long handled sponge. Plan: Times per week: 5 x / wk for 90 minutes, 1 x / wk for 30 minutes  Times per day: Daily  Current Treatment Recommendations: [place] Re-education,Patient/Caregiver Education & Training,Self-Care / ADL,Equipment Evaluation, Education, & procurement,Safety Education & Training    Patient Education  Patient Education: ADL's and Precautions    Goals  Short term goals  Time Frame for Short term goals: by discharge  Short term goal 1: patient will tolerate 7 min functional standing with two hand release with (S) to increase activity tolerance for grooming and toileting. Short term goal 2: Pt. to complete sinkside grooming with set up/clean up assistance with good carryover reaching items without twisting. Short term goal 3: Discontinued  Short term goal 4: Discontinued  Short term goal 5: patient will complete toileting routine with (S) and 0-1 verbal cues for spinal precautions. Long term goals  Time Frame for Long term goals : 2 weeks  Long term goal 1: patient will complete tub transfer with (S). Long term goal 2: patient will complete light meal prep task with MOD I within spinal precautions. Long term goal 3: patient will complete grocery shopping task with MOD I using strategies to reach items within spinal precautions. Long term goal 4: patient will complete ADL routine with MOD I with use of AE PRN. Long term goal 5: patient will retrieve items from various heights with least restrictive device and AE PRN with MOD I to safely transition to prior living environment to complete IADLs. Following session, patient left in safe position with all fall risk precautions in place.

## 2022-02-17 NOTE — PROGRESS NOTES
[facility]  INPATIENT PHYSICAL THERAPY  DAILY NOTE  [facility]    Time In: 1330  Time Out: 1400  Timed Code Treatment Minutes: 30 Minutes  Minutes: 30          Date: 2022  Patient Name: Mckenzie Saravia,  Gender:  female        MRN: [id number]  : 1945  (77 y.o.)  Referral Date : 22  Referring Practitioner: LUANN Bridges (referring), Gail Higuera MD  Diagnosis: Spinal stenosis of lumbar region with radiculopathy  Additional Pertinent Hx: Per H&P: \"Gail Anthony  is a 77 y.o. right-handed  female with a history of hypertension, GERD, hyperlipidemia, thyroid nodule, atrial fibrillation requiring WATCHMAN procedure, rheumatoid arthritis with bilateral hand fingers deformity, COPD and emphysema, diverticulosis, coronary artery disease requiring coronary arteries angioplasty and stenting, congestive heart failure, anemia, status post pacemaker placement, status post bilateral total knee arthroplasties and bilateral total hip arthroplasty, status post multiple bilateral feet surgeries for deformity, status post lumbar spine surgeries in  and , status post appendectomy, hysterectomy, hemorrhoidectomy, is admitted to the inpatient rehabilitation unit on 2022 for intensive inpatient rehabilitation treatment of impaired ADLs and ambulation secondary to lumbar spinal stenosis with left lower extremity radiculopathy requiring lumbar laminectomy and disc excision on 2022 by Dr. Milena Hanna. \"  Pt transferred to inpatient rehab 22.      Prior Level of Function:  Lives With: Alone  Type of Home: House  Home Layout: One level  Home Access: Stairs to enter without rails  Entrance Stairs - Number of Steps: 2 steps to garage with left hand rail, 1 + 1 +1 step into home  Home Equipment: Rolling walker,Standard walker,Wheelchair-manual,Cane   Bathroom Shower/Tub: Tub/Shower unit  Bathroom Toilet: Handicap height  Bathroom Equipment: Grab bars in shower (reports she has access to a shower chair with a back)  Bathroom Accessibility: Accessible (patient states she will have difficulty getting a 2 w/w into the BR at home. will have to side step or furniture walk)    Receives Help From: Friend(s)  ADL Assistance: [address]: Independent  Homemaking Responsibilities: Yes  Ambulation Assistance: Independent  Transfer Assistance: Independent  Active : Yes  Additional Comments: Pt IND prior, family in the area to help as needed. Pt was using cane about 1-2 weeks prior to admission due to pain    Restrictions/Precautions:  Restrictions/Precautions: General Precautions,Fall Risk,Surgical Protocols  Required Braces or Orthoses  Spinal: Lumbar Corset  Position Activity Restriction  Spinal Precautions: No Bending,No Lifting,No Twisting  Other position/activity restrictions: L1-L4 Laminectomy with complication (durotomy) on 2/4     SUBJECTIVE: Patient in bedside chair upon arrival and agreeable to therapy. PAIN: denies    Vitals: Vitals not assessed per clinical judgement, see nursing flowsheet    OBJECTIVE:  Bed Mobility:  Not Tested    Transfers:  Sit to Stand: Stand By Assistance  Stand to Sit:Stand By Assistance    Ambulation:  Stand By Assistance  Distance: level: 120ft, 15ft, 20ft, 60ft, 170ft    Ramp: 10ft  Surface: Level Tile and Ramp  Device:Rolling Walker  Gait Deviations:  Slow Tianna, Decreased Step Length Bilaterally, Decreased Gait Speed and steady, no LOB    Stairs:  Contact Guard Assistance  Number of Steps: 8  Height: 6\" step with Bilateral Handrails, cues for sequencing  Stairs:  Contact Guard Assistance  Number of Steps: 4  Height: 6\" step with One Handrail, BUE support at L rail, side-stepping pattern    Exercise:  None this session. Functional Outcome Measures: Not completed       ASSESSMENT:  Assessment: Patient progressing toward established goals.   Activity Tolerance:  Patient tolerance of  treatment: good. Equipment Recommendations:Equipment Needed: No  Discharge Recommendations: Home with Home Health PT  Plan: Times per week: 5x/wk 90min, 1x/wk 30min  Times per day: Daily  Plan weeks: 2  Current Treatment Recommendations: [place] Re-education,Patient/Caregiver Education & Training,Balance Training,Gait Training,Home Exercise Program,Functional Mobility Training,Stair training,Safety Education & Training    Patient Education  Patient Education: Plan of Care, Precautions/Restrictions, Transfers, Gait, Stairs    Goals:  Patient goals : \"be able to be self sufficient\"  Short term goals  Time Frame for Short term goals: 1 week  Short term goal 1: Patient will complete supine < > sit with SBA to transfer in/out of bed with decreased difficulty. Short term goal 2: Patient will complete sit < > stand with SBA with a RW to stand to ambulate with decreased difficulty. MET, SEE LTG  Short term goal 3: Patient will ambulate 80' with a RW and SBA to progress towards navigating home safely. MET, SEE LTG  Short term goal 4: Patient will ascend/descend 2, 6\" step with a RW with CGA to progress towards safe home entry. MET, SEE LTG  Short term goal 5: Patient will improve tinetti to greater than or equal to 19/28 to reduce her risk for falls. MET, SEE LTG  Long term goals  Time Frame for Long term goals : 2 weeks  Long term goal 1: Patient will complete supine  < > sit with modified independence to transfer in/out of bed safely. Long term goal 2: Patient will sit < > stand with modified independence to stand to ambulate safely. Long term goal 3: Patient will ambulate 80' with a RW and modified independence to navigate home safely. Long term goal 4: Patient will ascend/descend 3, 6\" steps with a RW and modified independence for safe home access.   Long term goal 5: Patient will ascend/descend 2, 6\" steps with a hand rail and modified independence for garage entry. Long term goal 6: Patient will complete car transfer with modified independence to transfer in/out of vehicle safely. Long term goal 7: Patient will improve tinetti to greater than or equal to 24/28 to reduce her risk for falls    Following session, patient left in safe position with all fall risk precautions in place.

## 2022-02-17 NOTE — PROGRESS NOTES
Patient: Oswaldo Talavera  Unit/Bed: 9N-08/590-Q  YOB: 1945  MRN: [id number] Acct: [de-identified]   Admitting Diagnosis: Cerebrospinal fluid leak due to lumbar surgery [G97.82, G96.00]  Admit Date:  2/11/2022  Hospital Day: 6    Assessment:     Principal Problem:    Spinal stenosis of lumbar region with radiculopathy  Active Problems:    Hyperlipidemia    HTN (hypertension)    Rheumatoid arthritis ([facility] [address]    Coronary artery disease involving native coronary artery of native heart without angina pectoris    Cerebrospinal fluid leak due to lumbar surgery    Atrial fibrillation (HCC)    Anemia    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 *      Plan:     Continue to follow        Subjective:     Patient has complaints of just being restless last evening. .   Medication side effects: none    Scheduled Meds:   folic acid  508 mcg Oral Nightly    Multivitamin Gummies Adult  2 each Oral Daily    PreserVision AREDS 2  1 each Oral BID    cetirizine  10 mg Oral Daily    polyethylene glycol  17 g Oral Daily    bisacodyl  5 mg Oral Daily    sennosides-docusate sodium  1 tablet Oral BID    atorvastatin  10 mg Oral Nightly    clopidogrel  75 mg Oral QAM    digoxin  125 mcg Oral Daily    magnesium oxide  250 mg Oral Daily    [START ON 2/18/2022] methotrexate  22.5 mg Oral Weekly    metoprolol tartrate  37.5 mg Oral BID    naloxegol  12.5 mg Oral QAM    pantoprazole  40 mg Oral QAM AC     Continuous Infusions:  PRN Meds:traMADol, HYDROcodone 5 mg - acetaminophen **OR** [DISCONTINUED] HYDROcodone 5 mg - acetaminophen, acetaminophen, bisacodyl, magnesium hydroxide, diazePAM, magic (miracle) mouthwash, nitroGLYCERIN, ondansetron, melatonin    Review of Systems  Pertinent items are noted in HPI. Objective:     Patient Vitals for the past 8 hrs:   BP Temp Pulse Resp SpO2   02/17/22 0840 (!) 143/68 97.8 °F (36.6 °C) 74 14 97 %     I/O last 3 completed shifts: In: 4162 [P.O.:1790]  Out: -   No intake/output data recorded.     BP (!) 143/68   Pulse 74   Temp 97.8 °F (36.6 °C)   Resp 14   Ht 5' 2.01\" (1.575 m)   Wt 118 lb 11.2 oz (53.8 kg)   SpO2 97%   BMI 21.70 kg/m²     General appearance: alert, appears stated age and cooperative  Head: Normocephalic, without obvious abnormality, atraumatic  Lungs: clear to auscultation bilaterally  Heart: regular rate and rhythm, S1, S2 normal, no murmur, click, rub or gallop  Abdomen: soft, non-tender; bowel sounds normal; no masses,  no organomegaly  Extremities: extremities normal, atraumatic, no cyanosis or edema  Skin: Skin color, texture, turgor normal. No rashes or lesions  Neurologic: weak    Electronically signed by James Ramsey MD on 2/17/2022 at 11:00 AM

## 2022-02-17 NOTE — PLAN OF CARE
Problem: Falls - Risk of:  Goal: Will remain free from falls  Description: Will remain free from falls  Outcome: Ongoing  Alert and uses call light as needed, bed and chair alarms in use  Goal: Absence of physical injury  Description: Absence of physical injury  Outcome: Ongoing     Problem: Infection - Surgical Site:  Goal: Will show no infection signs and symptoms  Description: Will show no infection signs and symptoms  Outcome: Ongoing   Incision JOSE EDUARDO, no redness or drainage noted. Problem: Mobility - Impaired:  Goal: Mobility will improve to maximum level  Description: Mobility will improve to maximum level  Outcome: Ongoing     Problem: Venous Thromboembolism:  Goal: Absence of signs or symptoms of impaired coagulation  Description: Absence of signs or symptoms of impaired coagulation  Outcome: Ongoing     Problem: Pain:  Goal: Pain level will decrease  Description: Pain level will decrease  Outcome: Ongoing  Goal: Control of acute pain  Description: Control of acute pain  Outcome: Ongoing  Goal: Control of chronic pain  Description: Control of chronic pain  Outcome: Ongoing     Problem: Bleeding:  Goal: Will show no signs and symptoms of excessive bleeding  Description: Will show no signs and symptoms of excessive bleeding  Outcome: Ongoing     Problem:  Activity:  Goal: Sleeping patterns will improve  Description: Sleeping patterns will improve  Outcome: Ongoing     Problem: IP DRESSINGS LOWER EXTREMITIES  Goal: STG - Patient will dress lower body from a seated position  Outcome: Ongoing     Problem: IP INSTRUMENTAL ACTIVITIES OF DAILY LIVING  Goal: LTG - Patient will independently complete basic home making activities to return to independent functioning at home  Outcome: Ongoing     Problem: IP BALANCE  Goal: LTG - Patient will maintain balance to allow for safe/functional mobility  Outcome: Ongoing     Problem: SAFETY  Goal: LTG - patient will utilize safety techniques  Outcome: Ongoing     Problem: Nutrition  Goal: Optimal nutrition therapy  Outcome: Ongoing     Problem: DISCHARGE BARRIERS  Goal: Patient's continuum of care needs are met  Outcome: Ongoing

## 2022-02-18 PROCEDURE — 97530 THERAPEUTIC ACTIVITIES: CPT

## 2022-02-18 PROCEDURE — 1180000000 HC REHAB R&B

## 2022-02-18 PROCEDURE — 97535 SELF CARE MNGMENT TRAINING: CPT

## 2022-02-18 PROCEDURE — 99232 SBSQ HOSP IP/OBS MODERATE 35: CPT | Performed by: NURSE PRACTITIONER

## 2022-02-18 PROCEDURE — 6370000000 HC RX 637 (ALT 250 FOR IP): Performed by: NURSE PRACTITIONER

## 2022-02-18 PROCEDURE — 6360000002 HC RX W HCPCS: Performed by: NURSE PRACTITIONER

## 2022-02-18 PROCEDURE — 6370000000 HC RX 637 (ALT 250 FOR IP): Performed by: PHYSICAL MEDICINE & REHABILITATION

## 2022-02-18 PROCEDURE — 97116 GAIT TRAINING THERAPY: CPT

## 2022-02-18 PROCEDURE — 97112 NEUROMUSCULAR REEDUCATION: CPT

## 2022-02-18 RX ORDER — TRAMADOL HYDROCHLORIDE 50 MG/1
50 TABLET ORAL EVERY 6 HOURS PRN
Status: DISCONTINUED | OUTPATIENT
Start: 2022-02-18 | End: 2022-02-23 | Stop reason: HOSPADM

## 2022-02-18 RX ADMIN — SENNOSIDES AND DOCUSATE SODIUM 1 TABLET: 50; 8.6 TABLET ORAL at 09:38

## 2022-02-18 RX ADMIN — METHOTREXATE 22.5 MG: 2.5 TABLET ORAL at 09:39

## 2022-02-18 RX ADMIN — METOPROLOL TARTRATE 37.5 MG: 25 TABLET, FILM COATED ORAL at 09:38

## 2022-02-18 RX ADMIN — TRAMADOL HYDROCHLORIDE 50 MG: 50 TABLET, COATED ORAL at 17:29

## 2022-02-18 RX ADMIN — ACETAMINOPHEN 650 MG: 325 TABLET ORAL at 00:33

## 2022-02-18 RX ADMIN — Medication 400 MCG: at 22:32

## 2022-02-18 RX ADMIN — DIGOXIN 125 MCG: 125 TABLET ORAL at 09:38

## 2022-02-18 RX ADMIN — Medication 1 EACH: at 22:33

## 2022-02-18 RX ADMIN — Medication 3 MG: at 22:30

## 2022-02-18 RX ADMIN — TRAMADOL HYDROCHLORIDE 25 MG: 50 TABLET, COATED ORAL at 09:39

## 2022-02-18 RX ADMIN — NALOXEGOL OXALATE 12.5 MG: 12.5 TABLET, FILM COATED ORAL at 09:38

## 2022-02-18 RX ADMIN — TRAMADOL HYDROCHLORIDE 25 MG: 50 TABLET, COATED ORAL at 03:04

## 2022-02-18 RX ADMIN — METOPROLOL TARTRATE 37.5 MG: 25 TABLET, FILM COATED ORAL at 22:30

## 2022-02-18 RX ADMIN — TRAMADOL HYDROCHLORIDE 50 MG: 50 TABLET, COATED ORAL at 23:38

## 2022-02-18 RX ADMIN — CLOPIDOGREL BISULFATE 75 MG: 75 TABLET, FILM COATED ORAL at 09:39

## 2022-02-18 RX ADMIN — ACETAMINOPHEN 650 MG: 325 TABLET ORAL at 06:31

## 2022-02-18 RX ADMIN — PANTOPRAZOLE SODIUM 40 MG: 40 TABLET, DELAYED RELEASE ORAL at 06:31

## 2022-02-18 RX ADMIN — Medication 2 EACH: at 10:00

## 2022-02-18 RX ADMIN — ATORVASTATIN CALCIUM 10 MG: 10 TABLET, FILM COATED ORAL at 22:30

## 2022-02-18 RX ADMIN — Medication 1 EACH: at 10:00

## 2022-02-18 RX ADMIN — CETIRIZINE HYDROCHLORIDE 10 MG: 10 TABLET, FILM COATED ORAL at 09:38

## 2022-02-18 ASSESSMENT — PAIN SCALES - GENERAL
PAINLEVEL_OUTOF10: 7
PAINLEVEL_OUTOF10: 7
PAINLEVEL_OUTOF10: 4
PAINLEVEL_OUTOF10: 5
PAINLEVEL_OUTOF10: 6
PAINLEVEL_OUTOF10: 5
PAINLEVEL_OUTOF10: 4

## 2022-02-18 ASSESSMENT — PAIN DESCRIPTION - PAIN TYPE: TYPE: SURGICAL PAIN

## 2022-02-18 ASSESSMENT — PAIN DESCRIPTION - ORIENTATION: ORIENTATION: MID

## 2022-02-18 ASSESSMENT — PAIN SCALES - WONG BAKER: WONGBAKER_NUMERICALRESPONSE: 0

## 2022-02-18 ASSESSMENT — PAIN DESCRIPTION - LOCATION: LOCATION: BACK

## 2022-02-18 ASSESSMENT — PAIN DESCRIPTION - FREQUENCY: FREQUENCY: INTERMITTENT

## 2022-02-18 ASSESSMENT — PAIN DESCRIPTION - ONSET: ONSET: ON-GOING

## 2022-02-18 ASSESSMENT — PAIN DESCRIPTION - DESCRIPTORS: DESCRIPTORS: SORE

## 2022-02-18 ASSESSMENT — PAIN DESCRIPTION - PROGRESSION: CLINICAL_PROGRESSION: GRADUALLY IMPROVING

## 2022-02-18 ASSESSMENT — PAIN - FUNCTIONAL ASSESSMENT: PAIN_FUNCTIONAL_ASSESSMENT: PREVENTS OR INTERFERES SOME ACTIVE ACTIVITIES AND ADLS

## 2022-02-18 NOTE — PROGRESS NOTES
Gail evaluated this morning. Complaining of left hip pain. Sutures not ready to be removed. Will re-evaluate tomorrow to determine if ready to be removed. Discussed with patient that pain medications will need to come from IP Rehab.  Patient understands

## 2022-02-18 NOTE — PROGRESS NOTES
[address] [address]  Recreational Therapy  Daily Note  [address]    Time Spent with Patient: 60 minutes    Date:  2/18/2022       Patient Name: Monika Altman      MRN: [id number]      YOB: 1945 (70 y.o.)       Gender: female  Diagnosis: spinal stenosis of lumbar region with radiculopathy  Referring Practitioner: LUANN Oliva    RESTRICTIONS/PRECAUTIONS:  Restrictions/Precautions: General Precautions,Fall Risk,Surgical Protocols  Vision: Impaired  Hearing: Exceptions to [facility]  Hearing Exceptions: Hard of hearing/hearing concerns    PAIN: 0-no c/o pain     SUBJECTIVE:  I need to use the bathroom     OBJECTIVE:  Sit to stand from recliner with CGA-ambulated to the bathroom with RW and SBA-able to doff underwear and pants with SBA-call light within reach-pt appreciative of giving her a puzzle to work on in her free time in her room  over the weekend and in the evenings-affect bright and pleasant-assisted pt to clean her bottom after bm-she was able to wipe sarahi area and don her underwear and pants with SBA-stood to wash her hands with SBA-ambulated to her recliner with RW and SBA-comfort measures given-pt very social-talked about her family, politics, where she likes to shop and about  her new great grandson coming in April or May         Patient Education  New Education Provided: Importance of Leisure, [facility] [employer] Safety    Electronically signed by: TANISHA Machuca  Date: 2/18/2022

## 2022-02-18 NOTE — PROGRESS NOTES
[facility]  INPATIENT PHYSICAL THERAPY  DAILY NOTE  [facility] - [id number]    Time In: 1329  Time Out: 1359  Timed Code Treatment Minutes: 30 Minutes  Minutes: 30          Date: 2022  Patient Name: Imani Zhong,  Gender:  female        MRN: [id number]  : 1945  (77 y.o.)  Referral Date : 22  Referring Practitioner: LUANN Livingston (referring), Ciera Posada MD  Diagnosis: Spinal stenosis of lumbar region with radiculopathy  Additional Pertinent Hx: Per H&P: \"Gail Malik  is a 77 y.o. right-handed  female with a history of hypertension, GERD, hyperlipidemia, thyroid nodule, atrial fibrillation requiring WATCHMAN procedure, rheumatoid arthritis with bilateral hand fingers deformity, COPD and emphysema, diverticulosis, coronary artery disease requiring coronary arteries angioplasty and stenting, congestive heart failure, anemia, status post pacemaker placement, status post bilateral total knee arthroplasties and bilateral total hip arthroplasty, status post multiple bilateral feet surgeries for deformity, status post lumbar spine surgeries in  and , status post appendectomy, hysterectomy, hemorrhoidectomy, is admitted to the inpatient rehabilitation unit on 2022 for intensive inpatient rehabilitation treatment of impaired ADLs and ambulation secondary to lumbar spinal stenosis with left lower extremity radiculopathy requiring lumbar laminectomy and disc excision on 2022 by Dr. Evan Thompson. \"  Pt transferred to inpatient rehab 22.      Prior Level of Function:  Lives With: Alone  Type of Home: House  Home Layout: One level  Home Access: Stairs to enter without rails  Entrance Stairs - Number of Steps: 2 steps to garage with left hand rail, 1 + 1 +1 step into home  Home Equipment: Rolling walker,Standard walker,Wheelchair-manual,Cane   Bathroom Shower/Tub: Tub/Shower unit  Bathroom Toilet: Handicap height  Bathroom Equipment: Grab bars in shower (reports she has access to a shower chair with a back)  Bathroom Accessibility: Accessible (patient states she will have difficulty getting a 2 w/w into the BR at home. will have to side step or furniture walk)    Receives Help From: Friend(s)  ADL Assistance: [address]: Independent  Homemaking Responsibilities: Yes  Ambulation Assistance: Independent  Transfer Assistance: Independent  Active : Yes  Additional Comments: Pt IND prior, family in the area to help as needed. Pt was using cane about 1-2 weeks prior to admission due to pain    Restrictions/Precautions:  Restrictions/Precautions: General Precautions,Fall Risk,Surgical Protocols  Required Braces or Orthoses  Spinal: Lumbar Corset  Position Activity Restriction  Spinal Precautions: No Bending,No Lifting,No Twisting  Other position/activity restrictions: L1-L4 Laminectomy with complication (durotomy) on 2/4     SUBJECTIVE: Patient in room in chair, needing to use restroom. Pt cooperative and pleasant, expressing needing to improve her balance. PAIN: no complaints of pain    Vitals: Vitals not assessed per clinical judgement, see nursing flowsheet    OBJECTIVE:  Bed Mobility:  Not Tested    Transfers:  Sit to Stand: Stand By Assistance  Stand to Sit:Stand By Assistance    Ambulation:  Stand By Assistance  Distance: ~180', 15'x2  Surface: Level Tile  Device:Rolling Walker  Gait Deviations: Forward Flexed Posture, Slow Tianna, Decreased Step Length Bilaterally, Decreased Gait Speed and Decreased Heel Strike Bilaterally  *Verbal cues for step height    Stairs:  Contact Guard Assistance, Minimal Assistance  Number of Steps: 4  Height: 6\" step with One Handrail   *Min assist with first step with descent as pt descended with Lieutenant Forget leg\" versus \"weak leg,\" despite education prior.   CGA for all other steps with increased time    Balance:  Static Standing Balance: Stand By Assistance  Dynamic Standing Balance: Stand By Assistance, [employer], Minimal Assistance     Neuromuscular education:  *Standing with no UE support, stepping forward then tossing ring towards target with CGA/min assist.  Completed x7 with right LE leading and 6 with left LE leading. Completed a second time while tossing with left UE. Completed to improve forward and lateral weight shift while completing UE dynamic activity. *Standing with no UE support, completing heel taps to 3 balance pods in front of her in random sequence with cues provided such as \"right foot to blue, left foot to 4. \"  Pt required CGA to min assist.  Increased difficulty with SLS on the left. Verbal cues for increased quad contraction and upright posture with single leg stance on left and noted improvement demonstrated. Completed to improve lateral weight shift and SLS for reduced fall risk. Functional Outcome Measures: Not completed       ASSESSMENT:  Assessment: Patient progressing toward established goals. Activity Tolerance:  Patient tolerance of  treatment: good.       Equipment Recommendations:Equipment Needed: No  Discharge Recommendations: Continue to assess pending progress and Patient would benefit from continued PT at discharge  Plan: Times per week: 5x/wk 90min, 1x/wk 30min  Times per day: Daily  Plan weeks: 2  Current Treatment Recommendations: Mica Santos Re-education,Patient/Caregiver Education & Training,Balance Training,Gait Training,Home Exercise Program,Functional Mobility Training,Stair training,Safety Education & Training    Patient Education  Patient Education: Gait, Stairs, Verbal Exercise Instruction,  - Patient Verbalized Understanding, - Patient Requires Continued Education    Goals:  Patient goals : \"be able to be self sufficient\"  Short term goals  Time Frame for Short term goals: 1 week  Short term goal 1: Patient will complete supine < > sit with SBA to transfer in/out of bed with decreased difficulty. Short term goal 2: Patient will complete sit < > stand with SBA with a RW to stand to ambulate with decreased difficulty. MET, SEE LTG  Short term goal 3: Patient will ambulate Deandre Viri 1560' with a RW and SBA to progress towards navigating home safely. MET, SEE LTG  Short term goal 4: Patient will ascend/descend 2, 6\" step with a RW with CGA to progress towards safe home entry. MET, SEE LTG  Short term goal 5: Patient will improve tinetti to greater than or equal to 19/28 to reduce her risk for falls. MET, SEE LTG  Long term goals  Time Frame for Long term goals : 2 weeks  Long term goal 1: Patient will complete supine  < > sit with modified independence to transfer in/out of bed safely. Long term goal 2: Patient will sit < > stand with modified independence to stand to ambulate safely. Long term goal 3: Patient will ambulate 80' with a RW and modified independence to navigate home safely. Long term goal 4: Patient will ascend/descend 3, 6\" steps with a RW and modified independence for safe home access. Long term goal 5: Patient will ascend/descend 2, 6\" steps with a hand rail and modified independence for garage entry. Long term goal 6: Patient will complete car transfer with modified independence to transfer in/out of vehicle safely. Long term goal 7: Patient will improve tinetti to greater than or equal to 24/28 to reduce her risk for falls    Following session, patient left in safe position with all fall risk precautions in place.

## 2022-02-18 NOTE — PROGRESS NOTES
Physical Medicine & Rehabilitation   Progress Note    Chief Complaint:  Lumbar stenosis. S/p lumbar espinoza with CSF leak. Rehab needs    Subjective:  Patient seen today, sitting up in chair. Patient with 2 doses tramadol in last 24 hours. No Norco taken since ordered. 3 doses tylenol in 24 hours. Patient states she was taking tramadol 50mg at home and tolerating well. Discussed increasing today. Patient states she hasn't been sleeping well. States itching is much improved and sutures to be removed tomorrow. Planned discharge from the inpatient rehabilitation unit to home which is scheduled for Wednesday, 2/23/22, with [address] to include Physical Therapy, Occupational Therapy, Nursing and HHA 2x week. Rehabilitation:  PT:   Transfers:  Sit to Stand: Stand By Assistance  Stand to Sit:Stand By Assistance  Ambulation:  Stand By Assistance  Distance: level: 120ft, 15ft, 20ft, 60ft, 170ft    Ramp: 10ft  Surface: Level Tile and Ramp  Device:Rolling Walker  Gait Deviations:  Slow Tianna, Decreased Step Length Bilaterally, Decreased Gait Speed and steady, no LOB  Stairs:  Contact Guard Assistance  Number of Steps: 8  Height: 6\" step with Bilateral Handrails, cues for sequencing  Stairs:  Contact Guard Assistance  Number of Steps: 4  Height: 6\" step with One Handrail, BUE support at L rail, side-stepping pattern    OT:   ADL:   Feeding: Independent. for beverage managementwith beverage management  Grooming: Supervision. sinkside to wash hands x2 trials during session. No twisting noted throughout. Toileting: Supervision. x2 trials   Toilet Transfer: Supervision. x2 trials. Pt. Demo good safety throughout ADLs. Pt. Able to don/doff brace with set up assistance with good carryover of technique. BALANCE:  Sitting Balance:  Independent. Standing Balance: Supervision. TRANSFERS:  Sit to Stand:  Supervision.     Stand to Sit: Supervision  FUNCTIONAL MOBILITY:  Assistive Device: Rolling normal limits  Sensory:  Sensory intact    Skin: warm and dry, no rash or erythema. Lumbar incision not visualized. Peripheral vascular: Pulses: Normal upper and lower extremity pulses; Edema: trace      Diagnostics:   No results found for this or any previous visit (from the past 24 hour(s)). Impression:  · Lumbar stenosis  ? L1-L4 laminectomies and a left side L2-L3 lumbar disk excision, CSF leak. On 22 with Dr Timothy Garcia  · Lumbar pain with radiculopathy - improved post op  · Left leg weakness  · Atrial septal defect   · Sick sinus syndrome with pacemaker  · PAF  · CAD with hx stent  · HFpEF  · Rheumatoid arthritis  · COPD  · HTN  · HLD  · GERD      Plan:  Continue current therapies  Prophylaxis: DVT - SCDs, JOEL hose, early ambulation, GI - Protonix  Pain: Norco 5/325mg every 4 hours PRN, tylenol, tramadol 50mg every 6 hours PRN  Bowels: dulcolax, MOM, Movantik, Miralax, senokot. +BM 22  Bladder: per Rehab nursing  RA: methotrexate weekly  Sleep: melatonin 3mg nightly PRN  Zyrtec for itching - avoiding benadryl due to drowsiness, plan to continue zyrtec through this admission, patient doesn't think she will need at home.    · Lumbar drain removed 22  · Team conference Wednesday   · Discharge plannin22, HH- PT, OT, RN, HHA       Missed Therapy Time:  · None    Alia Spring, APRN - CNP

## 2022-02-18 NOTE — PLAN OF CARE
Problem: Falls - Risk of:  Goal: Will remain free from falls  Description: Will remain free from falls  Outcome: Ongoing  Using call light as needed, bed and chair alarms in use,   Goal: Absence of physical injury  Description: Absence of physical injury  Outcome: Ongoing     Problem: Infection - Surgical Site:  Goal: Will show no infection signs and symptoms  Description: Will show no infection signs and symptoms  Outcome: Ongoing. incision JOSE EDUARDO, no redness or drainage     Problem: Mobility - Impaired:  Goal: Mobility will improve to maximum level  Description: Mobility will improve to maximum level  Outcome: Ongoing     Problem: Venous Thromboembolism:  Goal: Absence of signs or symptoms of impaired coagulation  Description: Absence of signs or symptoms of impaired coagulation  Outcome: Ongoing     Problem: Pain:  Goal: Pain level will decrease  Description: Pain level will decrease  Outcome: Ongoing  Goal: Control of acute pain  Description: Control of acute pain  Outcome: Ongoing  Goal: Control of chronic pain  Description: Control of chronic pain  Outcome: Ongoing     Problem: Bleeding:  Goal: Will show no signs and symptoms of excessive bleeding  Description: Will show no signs and symptoms of excessive bleeding  Outcome: Ongoing     Problem:  Activity:  Goal: Sleeping patterns will improve  Description: Sleeping patterns will improve  Outcome: Ongoing     Problem: IP DRESSINGS LOWER EXTREMITIES  Goal: STG - Patient will dress lower body from a seated position  Outcome: Ongoing     Problem: IP INSTRUMENTAL ACTIVITIES OF DAILY LIVING  Goal: LTG - Patient will independently complete basic home making activities to return to independent functioning at home  Outcome: Ongoing     Problem: IP BALANCE  Goal: LTG - Patient will maintain balance to allow for safe/functional mobility  Outcome: Ongoing     Problem: SAFETY  Goal: LTG - patient will utilize safety techniques  Outcome: Ongoing     Problem: Nutrition  Goal: Optimal nutrition therapy  Outcome: Ongoing     Problem: DISCHARGE BARRIERS  Goal: Patient's continuum of care needs are met  Outcome: Ongoing

## 2022-02-18 NOTE — PROGRESS NOTES
[address]  Occupational Therapy  Daily Note  Time:   Time In: 1000  Time Out: 1110  Timed Code Treatment Minutes: 20 Minutes  Minutes: 70          Date: 2022  Patient Name: Luisito Rasheed,   Gender: female      Room: [address] [place], [place] [address]-  MRN: [id number]  : 1945  (77 y.o.)  Referring Practitioner: LUANN Bowman  Diagnosis: spinal stenosis of lumbar region with radiculopathy  Additional Pertinent Hx: Per EMR \"Gail is a 75y/o female known to our office having been seen in the past several months with complaints of low back pain and leg pain. She has been through multiple epidural injections, most recently last month, with some mild-moderate relief of her pain. Over the past several days her pain had been worsening significantly to the point it was completely uncontrolled and her mobility had declined significantly. She called our office and the only recommendation was for her to present to the ED for evaluation and admission for further care. She does have a history of previous lumbar spine surgery roughly 20yrs ago. History of pacemaker, afib and is on plavix as well. She describes a sharp pain traveling from the low back into the left lateral/anterior thigh are. No new changes in bowel/bladder continence. \" L1-L4 Laminectomy with complication (durotomy) on     Restrictions/Precautions:  Restrictions/Precautions: General Precautions,Fall Risk,Surgical Protocols  Required Braces or Orthoses  Spinal: Lumbar Corset  Position Activity Restriction  Spinal Precautions: No Bending,No Lifting,No Twisting  Other position/activity restrictions: L1-L4 Laminectomy with complication (durotomy) on      SUBJECTIVE: Pt. In room pleasant and agreeable to OT session. Pt. Very talkative throughout redirected to remain on task.      PAIN: pain in LE's , not rated     Vitals: Vitals not assessed per clinical judgement, see nursing flowsheet    COGNITION: term goals: by discharge  Short term goal 1: patient will tolerate 7 min functional standing with two hand release with (S) to increase activity tolerance for grooming and toileting. Short term goal 2: Pt. to complete sinkside grooming with set up/clean up assistance with good carryover reaching items without twisting. Short term goal 3: Discontinued  Short term goal 4: Discontinued  Short term goal 5: patient will complete toileting routine with (S) and 0-1 verbal cues for spinal precautions. Long term goals  Time Frame for Long term goals : 2 weeks  Long term goal 1: patient will complete tub transfer with (S). Long term goal 2: patient will complete light meal prep task with MOD I within spinal precautions. Long term goal 3: patient will complete grocery shopping task with MOD I using strategies to reach items within spinal precautions. Long term goal 4: patient will complete ADL routine with MOD I with use of AE PRN. Long term goal 5: patient will retrieve items from various heights with least restrictive device and AE PRN with MOD I to safely transition to prior living environment to complete IADLs. Following session, patient left in safe position with all fall risk precautions in place.

## 2022-02-18 NOTE — PROGRESS NOTES
Patient: Norma Salgado  Unit/Bed: 7D-50/451-Y  YOB: 1945  MRN: [id number] Acct: [de-identified]   Admitting Diagnosis: Cerebrospinal fluid leak due to lumbar surgery [G97.82, G96.00]  Admit Date:  2/11/2022  Hospital Day: 7    Assessment:     Principal Problem:    Spinal stenosis of lumbar region with radiculopathy  Active Problems:    Hyperlipidemia    HTN (hypertension)    Rheumatoid arthritis ([facility] [address]    Coronary artery disease involving native coronary artery of native heart without angina pectoris    Cerebrospinal fluid leak due to lumbar surgery    Atrial fibrillation ([facility])    Anemia    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 *      Plan:     Continue to follow        Subjective:     Patient has no complaint of CP or SOB but still with some trouble sleeping. .   Medication side effects: none    Scheduled Meds:   folic acid  716 mcg Oral Nightly    Multivitamin Gummies Adult  2 each Oral Daily    PreserVision AREDS 2  1 each Oral BID    cetirizine  10 mg Oral Daily    polyethylene glycol  17 g Oral Daily    bisacodyl  5 mg Oral Daily    sennosides-docusate sodium  1 tablet Oral BID    atorvastatin  10 mg Oral Nightly    clopidogrel  75 mg Oral QAM    digoxin  125 mcg Oral Daily    magnesium oxide  250 mg Oral Daily    methotrexate  22.5 mg Oral Weekly    metoprolol tartrate  37.5 mg Oral BID    naloxegol  12.5 mg Oral QAM    pantoprazole  40 mg Oral QAM AC     Continuous Infusions:  PRN Meds:traMADol, HYDROcodone 5 mg - acetaminophen **OR** [DISCONTINUED] HYDROcodone 5 mg - acetaminophen, acetaminophen, bisacodyl, magnesium hydroxide, diazePAM, magic (miracle) mouthwash, nitroGLYCERIN, ondansetron, melatonin    Review of Systems  Pertinent items are noted in HPI. Objective:     No data found. I/O last 3 completed shifts:   In: 900 [P.O.:900]  Out: -   I/O this shift:  In: 360 [P.O.:360]  Out: -     BP (!) 137/56   Pulse 68   Temp 97.5 °F (36.4 °C) (Oral)   Resp 16   Ht 5' 2.01\" (1.575 m)   Wt 118 lb 11.2 oz (53.8 kg)   SpO2 94%   BMI 21.70 kg/m²     General appearance: alert, appears stated age and cooperative  Head: Normocephalic, without obvious abnormality, atraumatic  Lungs: clear to auscultation bilaterally  Heart: regular rate and rhythm, S1, S2 normal, no murmur, click, rub or gallop  Abdomen: soft, non-tender; bowel sounds normal; no masses,  no organomegaly  Extremities: joint deformities  Skin: Skin color, texture, turgor normal. No rashes or lesions  Neurologic: weak    Electronically signed by Eun Ware MD on 2/18/2022 at 6:14 PM

## 2022-02-18 NOTE — PROGRESS NOTES
[address] [address]  INPATIENT PHYSICAL THERAPY  DAILY NOTE  [facility] - 7E-59/059-A    Time In: 0830  Time Out: 0930  Timed Code Treatment Minutes: 60 Minutes  Minutes: 60          Date: 2022  Patient Name: Quinton Ibrahim,  Gender:  female        MRN: [id number]  : 1945  (77 y.o.)  Referral Date : 22  Referring Practitioner: LUANN Aldrich (referring), Saran Fatima MD  Diagnosis: Spinal stenosis of lumbar region with radiculopathy  Additional Pertinent Hx: Per H&P: \"Gail Bone  is a 77 y.o. right-handed  female with a history of hypertension, GERD, hyperlipidemia, thyroid nodule, atrial fibrillation requiring WATCHMAN procedure, rheumatoid arthritis with bilateral hand fingers deformity, COPD and emphysema, diverticulosis, coronary artery disease requiring coronary arteries angioplasty and stenting, congestive heart failure, anemia, status post pacemaker placement, status post bilateral total knee arthroplasties and bilateral total hip arthroplasty, status post multiple bilateral feet surgeries for deformity, status post lumbar spine surgeries in  and , status post appendectomy, hysterectomy, hemorrhoidectomy, is admitted to the inpatient rehabilitation unit on 2022 for intensive inpatient rehabilitation treatment of impaired ADLs and ambulation secondary to lumbar spinal stenosis with left lower extremity radiculopathy requiring lumbar laminectomy and disc excision on 2022 by Dr. Nellie Pastrana. \"  Pt transferred to inpatient rehab 22.      Prior Level of Function:  Lives With: Alone  Type of Home: House  Home Layout: One level  Home Access: Stairs to enter without rails  Entrance Stairs - Number of Steps: 2 steps to garage with left hand rail, 1 + 1 +1 step into home  Home Equipment: Rolling walker,Standard walker,Wheelchair-manual,Cane   Bathroom Shower/Tub: Tub/Shower unit  Bathroom Toilet: Handicap height  Bathroom Equipment: Grab bars in shower (reports she has access to a shower chair with a back)  Bathroom Accessibility: Accessible (patient states she will have difficulty getting a 2 w/w into the BR at home. will have to side step or furniture walk)    Receives Help From: Friend(s)  ADL Assistance: [address]: Independent  Homemaking Responsibilities: Yes  Ambulation Assistance: Independent  Transfer Assistance: Independent  Active : Yes  Additional Comments: Pt IND prior, family in the area to help as needed. Pt was using cane about 1-2 weeks prior to admission due to pain    Restrictions/Precautions:  Restrictions/Precautions: General Precautions,Fall Risk,Surgical Protocols  Required Braces or Orthoses  Spinal: Lumbar Corset  Position Activity Restriction  Spinal Precautions: No Bending,No Lifting,No Twisting  Other position/activity restrictions: L1-L4 Laminectomy with complication (durotomy) on 2/4     SUBJECTIVE: Patient in room in chair, finishing breakfast, agreeable to PT. Pt cooperative and pleasant throughout. Pt notes left hip with increased soreness at night, side she has a hx of THR. PAIN: 6/10: left hip, low back    Vitals: Vitals not assessed per clinical judgement, see nursing flowsheet    OBJECTIVE:  Bed Mobility:  Rolling to Right: Stand By Assistance   Supine to Sit: Stand By Assistance  Sit to Supine: Stand By Assistance  *Pt notes utilizing step stool to get into bed at home due to height of bed. Trialed transferring into bed with 4\" step, completing retro step onto step stood with use of RW in front of pt and then sitting down on mat. Pt demonstrating good safety with this technique. Pt attempting to complete transfer from the side and nearly breaking back precautions.      Transfers:  Sit to Stand: Stand By Assistance  Stand to Sit:Stand By Assistance   *Patient completed sit < > stand transfers from various surfaces including recliner, couch and chair with no arms. Pt reports having a lift chair at home, and that she uses lift feature. Education on trying to avoid utilizing lift feature to maintain strength in her lower extremities and pt expressed understanding. Ambulation:  Stand By Assistance  Distance: 70', 90', 75', 100' plus around therapy gym with item retrieval task  Surface: Level Tile  Device:Rolling Walker  Gait Deviations: Forward Flexed Posture, Slow [place], Decreased Step Length Bilaterally, Decreased Gait Speed and Decreased Heel Strike Bilaterally  *Verbal cues for improved posture and to keep body at safe distance from a RW    *Patient ambulated around therapy gym with RW and PT holding reaching for use if needed. Pt instructed to retrieve cones located at various heights throughout the room safely. Pt demonstrated safe retrieval, maintaining back precautions. Balance:  Static Standing Balance: Contact Guard Assistance  Dynamic Standing Balance: Contact Guard Assistance    Functional Outcome Measures: Not completed       ASSESSMENT:  Assessment: Patient progressing toward established goals. Activity Tolerance:  Patient tolerance of  treatment: good.       Equipment Recommendations:Equipment Needed: No  Discharge Recommendations: Continue to assess pending progress and Patient would benefit from continued PT at discharge  Plan: Times per week: 5x/wk 90min, 1x/wk 30min  Times per day: Daily  Plan weeks: 2  Current Treatment Recommendations: Adam Freed Re-education,Patient/Caregiver Education & Training,Balance Training,Gait Training,Home Exercise Program,Functional Mobility Training,Stair training,Safety Education & Training    Patient Education  Patient Education: [employer], Transfers, Gait, Home Safety Education,  - Patient Verbalized Understanding, - Patient Requires Continued Education    Goals:  Patient goals : \"be able to be self sufficient\"  Short term goals  Time Frame for Short term goals: 1 week  Short term goal 1: Patient will complete supine < > sit with SBA to transfer in/out of bed with decreased difficulty. Short term goal 2: Patient will complete sit < > stand with SBA with a RW to stand to ambulate with decreased difficulty. MET, SEE LTG  Short term goal 3: Patient will ambulate Deandre Viri 1560' with a RW and SBA to progress towards navigating home safely. MET, SEE LTG  Short term goal 4: Patient will ascend/descend 2, 6\" step with a RW with CGA to progress towards safe home entry. MET, SEE LTG  Short term goal 5: Patient will improve tinetti to greater than or equal to 19/28 to reduce her risk for falls. MET, SEE LTG  Long term goals  Time Frame for Long term goals : 2 weeks  Long term goal 1: Patient will complete supine  < > sit with modified independence to transfer in/out of bed safely. Long term goal 2: Patient will sit < > stand with modified independence to stand to ambulate safely. Long term goal 3: Patient will ambulate 80' with a RW and modified independence to navigate home safely. Long term goal 4: Patient will ascend/descend 3, 6\" steps with a RW and modified independence for safe home access. Long term goal 5: Patient will ascend/descend 2, 6\" steps with a hand rail and modified independence for garage entry. Long term goal 6: Patient will complete car transfer with modified independence to transfer in/out of vehicle safely. Long term goal 7: Patient will improve tinetti to greater than or equal to 24/28 to reduce her risk for falls    Following session, patient left in safe position with all fall risk precautions in place.

## 2022-02-18 NOTE — PROGRESS NOTES
[address]  Occupational Therapy  Daily Note  Time:   Time In: 1400  Time Out: 1420  Timed Code Treatment Minutes: 20 Minutes  Minutes: 20          Date: 2022  Patient Name: Cristina Engle,   Gender: female      Room: [facility]/059-A  MRN: [id number]  : 1945  (77 y.o.)  Referring Practitioner: LUANN Sutton  Diagnosis: spinal stenosis of lumbar region with radiculopathy  Additional Pertinent Hx: Per EMR \"Gail is a 77y/o female known to our office having been seen in the past several months with complaints of low back pain and leg pain. She has been through multiple epidural injections, most recently last month, with some mild-moderate relief of her pain. Over the past several days her pain had been worsening significantly to the point it was completely uncontrolled and her mobility had declined significantly. She called our office and the only recommendation was for her to present to the ED for evaluation and admission for further care. She does have a history of previous lumbar spine surgery roughly 20yrs ago. History of pacemaker, afib and is on plavix as well. She describes a sharp pain traveling from the low back into the left lateral/anterior thigh are. No new changes in bowel/bladder continence.  \" L1-L4 Laminectomy with complication (durotomy) on     Restrictions/Precautions:  Restrictions/Precautions: General Precautions,Fall Risk,Surgical Protocols  Required Braces or Orthoses  Spinal: Lumbar Corset  Position Activity Restriction  Spinal Precautions: No Bending,No Lifting,No Twisting  Other position/activity restrictions: L1-L4 Laminectomy with complication (durotomy) on      SUBJECTIVE: Pt in therapy gym upon arrival, pt agreeable to OT session, pt pleasant and cooperative during session     PAIN: 0/10: pt reports no pain, however mild aching with L hip, with pt given ice post session     Vitals: Nurse checked vitals prior to session    COGNITION: WFL    ADL:   No ADL's completed this session. Rey Smith BALANCE:  Standing Balance: Stand By Assistance. with pt putting away clothing items with 1 vc for back precautions with pt able to stand for 4-5 minutes with RW. BED MOBILITY:  Sit to Supine: Stand By Assistance with good adherence to back precautions with HOB lowered    TRANSFERS:  Sit to Stand:  Stand By Assistance. from all surfaces  Stand to Sit: Stand By Assistance. to all surfaces    FUNCTIONAL MOBILITY:  Assistive Device: Rolling Walker  Assist Level:  Contact Guard Assistance. Distance: To and from therapy gym       ADDITIONAL ACTIVITIES:  Pt completed IADL activity of putting away clothes in room, with pt able to stand at the cabinet and take clothing items out of bag, and place them into the drawers. Pt required 1 vc for back precautions as pt attempted to twist, instead of replacing walker, with pt educated on walker placement with good understanding. ASSESSMENT:     Activity Tolerance:  Patient tolerance of  treatment: good. Discharge Recommendations: Home with Home Health OT  Equipment Recommendations: Equipment Needed: Yes  Mobility Devices: ADL Assistive Devices  Other: patient would be interested in 2 w/w tray if discharged with a 2 w/w for functional mobility. Pt. Reports she does have a shower chair with a back available if needed. May benefit from long handled reacher, dressing stick and long handled sponge.   Plan: Times per week: 5 x / wk for 90 minutes, 1 x / wk for 30 minutes  Times per day: Daily  Current Treatment Recommendations: Kurt Aguiar Re-education,Patient/Caregiver Education & Training,Self-Care / ADL,Equipment Evaluation, Education, & procurement,Safety Education & Training    Patient Education  Patient Education: Precautions    Goals  Short term goals  Time Frame for Short term goals: by discharge  Short term goal 1: patient will tolerate 7 min functional standing with two hand release with (S) to increase activity tolerance for grooming and toileting. Short term goal 2: Pt. to complete sinkside grooming with set up/clean up assistance with good carryover reaching items without twisting. Short term goal 3: Discontinued  Short term goal 4: Discontinued  Short term goal 5: patient will complete toileting routine with (S) and 0-1 verbal cues for spinal precautions. Long term goals  Time Frame for Long term goals : 2 weeks  Long term goal 1: patient will complete tub transfer with (S). Long term goal 2: patient will complete light meal prep task with MOD I within spinal precautions. Long term goal 3: patient will complete grocery shopping task with MOD I using strategies to reach items within spinal precautions. Long term goal 4: patient will complete ADL routine with MOD I with use of AE PRN. Long term goal 5: patient will retrieve items from various heights with least restrictive device and AE PRN with MOD I to safely transition to prior living environment to complete IADLs. Following session, patient left in safe position with all fall risk precautions in place.

## 2022-02-18 NOTE — PROGRESS NOTES
[address]  Diagnosis List for Inpatient Rehab facility (IRF) - Patient Assessment Instrument (IDA)    Patient Name: Mckenzie Saravia        MRN: [id number]    : 1945  (77 y.o.)  Gender: female     Primary impairment requiring rehabilitation: 3.9 Other neurological     Etiologic Diagnosis that led to the condition: Lumbar stenosis with radiculopathy     Comorbid conditions affecting rehabilitation:  Lumbar stenosis  L1-L4 laminectomies and a left side L2-L3 lumbar disk excision, CSF leak.  On 22 with Dr Milena Hanna  Lumbar pain with radiculopathy - improved post op  Left leg weakness  Atrial septal defect   Sick sinus syndrome with pacemaker  PAF  CAD with hx stent  *HFpEF  Rheumatoid arthritis  COPD  HTN  HLD  GERD      Gail Higuera M.D.

## 2022-02-19 PROCEDURE — 1180000000 HC REHAB R&B

## 2022-02-19 PROCEDURE — 94760 N-INVAS EAR/PLS OXIMETRY 1: CPT

## 2022-02-19 PROCEDURE — 6370000000 HC RX 637 (ALT 250 FOR IP): Performed by: NURSE PRACTITIONER

## 2022-02-19 RX ADMIN — DIGOXIN 125 MCG: 125 TABLET ORAL at 08:11

## 2022-02-19 RX ADMIN — ACETAMINOPHEN 650 MG: 325 TABLET ORAL at 21:48

## 2022-02-19 RX ADMIN — ATORVASTATIN CALCIUM 10 MG: 10 TABLET, FILM COATED ORAL at 21:48

## 2022-02-19 RX ADMIN — CETIRIZINE HYDROCHLORIDE 10 MG: 10 TABLET, FILM COATED ORAL at 08:10

## 2022-02-19 RX ADMIN — ACETAMINOPHEN 650 MG: 325 TABLET ORAL at 01:49

## 2022-02-19 RX ADMIN — METOPROLOL TARTRATE 37.5 MG: 25 TABLET, FILM COATED ORAL at 08:11

## 2022-02-19 RX ADMIN — SENNOSIDES AND DOCUSATE SODIUM 1 TABLET: 50; 8.6 TABLET ORAL at 21:48

## 2022-02-19 RX ADMIN — NALOXEGOL OXALATE 12.5 MG: 12.5 TABLET, FILM COATED ORAL at 08:11

## 2022-02-19 RX ADMIN — TRAMADOL HYDROCHLORIDE 50 MG: 50 TABLET, COATED ORAL at 08:20

## 2022-02-19 RX ADMIN — CLOPIDOGREL BISULFATE 75 MG: 75 TABLET, FILM COATED ORAL at 08:11

## 2022-02-19 RX ADMIN — SENNOSIDES AND DOCUSATE SODIUM 1 TABLET: 50; 8.6 TABLET ORAL at 08:11

## 2022-02-19 RX ADMIN — DIAZEPAM 5 MG: 5 TABLET ORAL at 01:49

## 2022-02-19 RX ADMIN — ACETAMINOPHEN 650 MG: 325 TABLET ORAL at 06:08

## 2022-02-19 RX ADMIN — Medication 250 MG: at 12:02

## 2022-02-19 RX ADMIN — TRAMADOL HYDROCHLORIDE 50 MG: 50 TABLET, COATED ORAL at 21:48

## 2022-02-19 RX ADMIN — Medication 1 EACH: at 08:12

## 2022-02-19 RX ADMIN — METOPROLOL TARTRATE 37.5 MG: 25 TABLET, FILM COATED ORAL at 21:48

## 2022-02-19 RX ADMIN — Medication 1 EACH: at 21:49

## 2022-02-19 RX ADMIN — Medication 400 MCG: at 21:49

## 2022-02-19 RX ADMIN — Medication 2 EACH: at 08:12

## 2022-02-19 RX ADMIN — PANTOPRAZOLE SODIUM 40 MG: 40 TABLET, DELAYED RELEASE ORAL at 06:08

## 2022-02-19 ASSESSMENT — PAIN SCALES - WONG BAKER: WONGBAKER_NUMERICALRESPONSE: 0

## 2022-02-19 ASSESSMENT — PAIN DESCRIPTION - ORIENTATION: ORIENTATION: LOWER;MID

## 2022-02-19 ASSESSMENT — PAIN DESCRIPTION - PROGRESSION: CLINICAL_PROGRESSION: GRADUALLY WORSENING

## 2022-02-19 ASSESSMENT — PAIN SCALES - GENERAL
PAINLEVEL_OUTOF10: 6
PAINLEVEL_OUTOF10: 5
PAINLEVEL_OUTOF10: 7
PAINLEVEL_OUTOF10: 6

## 2022-02-19 ASSESSMENT — PAIN DESCRIPTION - LOCATION: LOCATION: BACK

## 2022-02-19 ASSESSMENT — PAIN - FUNCTIONAL ASSESSMENT: PAIN_FUNCTIONAL_ASSESSMENT: PREVENTS OR INTERFERES SOME ACTIVE ACTIVITIES AND ADLS

## 2022-02-19 ASSESSMENT — PAIN DESCRIPTION - PAIN TYPE: TYPE: SURGICAL PAIN

## 2022-02-19 ASSESSMENT — PAIN DESCRIPTION - DESCRIPTORS: DESCRIPTORS: SORE

## 2022-02-19 ASSESSMENT — PAIN DESCRIPTION - FREQUENCY: FREQUENCY: INTERMITTENT

## 2022-02-19 ASSESSMENT — PAIN DESCRIPTION - DIRECTION: RADIATING_TOWARDS: LEFT HIP

## 2022-02-19 NOTE — PLAN OF CARE
Problem: Falls - Risk of:  Goal: Will remain free from falls  Description: Will remain free from falls  Outcome: Ongoing  Falling star prevention in place. Bed and chair alarms in use. Call light in reach. Purposeful hourly rounding. Problem: Infection - Surgical Site:  Goal: Will show no infection signs and symptoms  Description: Will show no infection signs and symptoms  Outcome: Ongoing  No signs or symptoms of infection noted. Problem: Mobility - Impaired:  Goal: Mobility will improve to maximum level  Description: Mobility will improve to maximum level  Outcome: Ongoing  Patient continues with therapies and is working toward discharge goals. Problem: Venous Thromboembolism:  Goal: Absence of signs or symptoms of impaired coagulation  Description: Absence of signs or symptoms of impaired coagulation  Outcome: Ongoing  No signs or symptoms of DVT noted. Negative estella's sign. Problem: Pain:  Goal: Pain level will decrease  Description: Pain level will decrease  Outcome: Ongoing  Pain decreases with rest, repositioning, ice application, and prn pain medications. Problem: Bleeding:  Goal: Will show no signs and symptoms of excessive bleeding  Description: Will show no signs and symptoms of excessive bleeding  Outcome: Ongoing  No signs of excessive bleeding noted this shift. Problem: Activity:  Goal: Sleeping patterns will improve  Description: Sleeping patterns will improve  Outcome: Ongoing  Sleep pattern has improved since admission. Problem: Nutrition  Goal: Optimal nutrition therapy  Outcome: Ongoing  Tolerating current diet and po fluids well.

## 2022-02-19 NOTE — PROGRESS NOTES
Gail resting in the chair. Difficulty sleeping due to left hip and buttock pain. Notes only symptomatic after working with therapy and trying to rest.    Incision CDI, sutures intact. Ready to remove sutures today.

## 2022-02-19 NOTE — PROGRESS NOTES
11 sutures removed from mid back incision per orders without difficulty. Mid incision area has small amount of serous drainage, so 4x4 pressure dressing applied and secured with op site. Tolerated well.

## 2022-02-20 PROCEDURE — 6370000000 HC RX 637 (ALT 250 FOR IP): Performed by: PHYSICAL MEDICINE & REHABILITATION

## 2022-02-20 PROCEDURE — 97110 THERAPEUTIC EXERCISES: CPT

## 2022-02-20 PROCEDURE — 6370000000 HC RX 637 (ALT 250 FOR IP): Performed by: NURSE PRACTITIONER

## 2022-02-20 PROCEDURE — 97535 SELF CARE MNGMENT TRAINING: CPT

## 2022-02-20 PROCEDURE — 1180000000 HC REHAB R&B

## 2022-02-20 PROCEDURE — 94760 N-INVAS EAR/PLS OXIMETRY 1: CPT

## 2022-02-20 PROCEDURE — 97530 THERAPEUTIC ACTIVITIES: CPT

## 2022-02-20 PROCEDURE — 97116 GAIT TRAINING THERAPY: CPT

## 2022-02-20 RX ADMIN — Medication 3 MG: at 22:17

## 2022-02-20 RX ADMIN — PANTOPRAZOLE SODIUM 40 MG: 40 TABLET, DELAYED RELEASE ORAL at 05:38

## 2022-02-20 RX ADMIN — NALOXEGOL OXALATE 12.5 MG: 12.5 TABLET, FILM COATED ORAL at 08:16

## 2022-02-20 RX ADMIN — SENNOSIDES AND DOCUSATE SODIUM 1 TABLET: 50; 8.6 TABLET ORAL at 08:22

## 2022-02-20 RX ADMIN — Medication 250 MG: at 11:19

## 2022-02-20 RX ADMIN — ACETAMINOPHEN 650 MG: 325 TABLET ORAL at 22:17

## 2022-02-20 RX ADMIN — CLOPIDOGREL BISULFATE 75 MG: 75 TABLET, FILM COATED ORAL at 08:16

## 2022-02-20 RX ADMIN — TRAMADOL HYDROCHLORIDE 50 MG: 50 TABLET, COATED ORAL at 22:17

## 2022-02-20 RX ADMIN — CETIRIZINE HYDROCHLORIDE 10 MG: 10 TABLET, FILM COATED ORAL at 08:16

## 2022-02-20 RX ADMIN — SENNOSIDES AND DOCUSATE SODIUM 1 TABLET: 50; 8.6 TABLET ORAL at 22:17

## 2022-02-20 RX ADMIN — Medication 2 EACH: at 08:18

## 2022-02-20 RX ADMIN — DIGOXIN 125 MCG: 125 TABLET ORAL at 08:16

## 2022-02-20 RX ADMIN — Medication 400 MCG: at 22:17

## 2022-02-20 RX ADMIN — ATORVASTATIN CALCIUM 10 MG: 10 TABLET, FILM COATED ORAL at 22:17

## 2022-02-20 RX ADMIN — METOPROLOL TARTRATE 37.5 MG: 25 TABLET, FILM COATED ORAL at 08:15

## 2022-02-20 RX ADMIN — Medication 1 EACH: at 22:18

## 2022-02-20 RX ADMIN — METOPROLOL TARTRATE 37.5 MG: 25 TABLET, FILM COATED ORAL at 22:17

## 2022-02-20 RX ADMIN — Medication 1 EACH: at 08:18

## 2022-02-20 ASSESSMENT — PAIN SCALES - GENERAL
PAINLEVEL_OUTOF10: 0
PAINLEVEL_OUTOF10: 6

## 2022-02-20 ASSESSMENT — PAIN DESCRIPTION - ORIENTATION: ORIENTATION: MID;LOWER

## 2022-02-20 ASSESSMENT — PAIN DESCRIPTION - DIRECTION: RADIATING_TOWARDS: LEFT HIP

## 2022-02-20 ASSESSMENT — PAIN DESCRIPTION - LOCATION: LOCATION: BACK

## 2022-02-20 ASSESSMENT — PAIN SCALES - WONG BAKER: WONGBAKER_NUMERICALRESPONSE: 0

## 2022-02-20 ASSESSMENT — PAIN DESCRIPTION - DESCRIPTORS: DESCRIPTORS: ACHING

## 2022-02-20 ASSESSMENT — PAIN DESCRIPTION - PAIN TYPE: TYPE: SURGICAL PAIN

## 2022-02-20 ASSESSMENT — PAIN DESCRIPTION - ONSET: ONSET: GRADUAL

## 2022-02-20 ASSESSMENT — PAIN - FUNCTIONAL ASSESSMENT: PAIN_FUNCTIONAL_ASSESSMENT: ACTIVITIES ARE NOT PREVENTED

## 2022-02-20 ASSESSMENT — PAIN DESCRIPTION - FREQUENCY: FREQUENCY: INTERMITTENT

## 2022-02-20 ASSESSMENT — PAIN DESCRIPTION - PROGRESSION: CLINICAL_PROGRESSION: GRADUALLY WORSENING

## 2022-02-20 NOTE — PROGRESS NOTES
[facility]  INPATIENT PHYSICAL THERAPY  Daily Note  [address] [facility] - 7E-59/059-A    Time In: 0830  Time Out: 0900  Timed Code Treatment Minutes: 30 Minutes  Minutes: 30          Date: 2022  Patient Name: Crow Strange,  Gender:  female        MRN: [id number]  : 1945  (77 y.o.)  Referral Date : 22  Referring Practitioner: LUANN Bradley (referring), Nancy Sahu MD  Diagnosis: Spinal stenosis of lumbar region with radiculopathy  Additional Pertinent Hx: Per H&P: \"Gail Gonzalez  is a 77 y.o. right-handed  female with a history of hypertension, GERD, hyperlipidemia, thyroid nodule, atrial fibrillation requiring WATCHMAN procedure, rheumatoid arthritis with bilateral hand fingers deformity, COPD and emphysema, diverticulosis, coronary artery disease requiring coronary arteries angioplasty and stenting, congestive heart failure, anemia, status post pacemaker placement, status post bilateral total knee arthroplasties and bilateral total hip arthroplasty, status post multiple bilateral feet surgeries for deformity, status post lumbar spine surgeries in  and , status post appendectomy, hysterectomy, hemorrhoidectomy, is admitted to the inpatient rehabilitation unit on 2022 for intensive inpatient rehabilitation treatment of impaired ADLs and ambulation secondary to lumbar spinal stenosis with left lower extremity radiculopathy requiring lumbar laminectomy and disc excision on 2022 by Dr. Sarthak Moreno. \"  Pt transferred to inpatient rehab 22.      Prior Level of Function:  Lives With: Alone  Type of Home: House  Home Layout: One level  Home Access: Stairs to enter without rails  Entrance Stairs - Number of Steps: 2 steps to garage with left hand rail, 1 + 1 +1 step into home  Home Equipment: Rolling walker,Standard walker,Wheelchair-manual,Cane   Bathroom Shower/Tub: Tub/Shower unit  Bathroom Toilet: Handicap height  Bathroom Equipment: Grab bars in shower (reports she has access to a shower chair with a back)  Bathroom Accessibility: Accessible (patient states she will have difficulty getting a 2 w/w into the BR at home. will have to side step or furniture walk)    Receives Help From: Friend(s)  ADL Assistance: [address]: Independent  Homemaking Responsibilities: Yes  Ambulation Assistance: Independent  Transfer Assistance: Independent  Active : Yes  Additional Comments: Pt IND prior, family in the area to help as needed. Pt was using cane about 1-2 weeks prior to admission due to pain    Restrictions/Precautions:  Restrictions/Precautions: General Precautions,Fall Risk,Surgical Protocols  Required Braces or Orthoses  Spinal: Lumbar Corset  Position Activity Restriction  Spinal Precautions: No Bending,No Lifting,No Twisting  Other position/activity restrictions: L1-L4 Laminectomy with complication (durotomy) on 2/4     SUBJECTIVE: Pt. Seated on BS chair and pleasantly agrees to therapy session. Pt. Motivated. PAIN: None  indicated    Vitals: Vitals not assessed per clinical judgement, see nursing flowsheet    OBJECTIVE:  Bed Mobility:  Not Tested    Transfers:  Sit to Stand: Contact Guard Assistance  Stand to Sit:Contact Guard Assistance    Ambulation:  Stand By Assistance  Distance: 15' x 1, 200' x 2  Surface: Level Tile  Device:Rolling Walker  Gait Deviations: Forward Flexed Posture, Slow Tianna, Decreased Step Length Bilaterally, Decreased Gait Speed, Narrow Base of Support and Decreased Terminal Knee Extension      Exercise:  Patient was guided in 1 set(s) 10 reps of exercises: Glut sets, Seated marches, Seated hamstring curls, Seated heel/toe raises, Long arc quads, Seated isometric hip adduction, Seated abduction/adduction, and abdominal isometrics. Exercises were completed for increased independence with functional mobility.     Functional Outcome Measures: Not completed ASSESSMENT:  Assessment: Patient progressing toward established goals. Activity Tolerance:  Patient tolerance of  treatment: good. Equipment Recommendations:Equipment Needed: No  Discharge Recommendations: Continue to assess pending progress,Patient would benefit from continued therapy after discharge     Plan: Times per week: 5x/wk 90min, 1x/wk 30min  Times per day: Daily  Plan weeks: 2  Current Treatment Recommendations: [facility] Re-education,Patient/Caregiver Education & Training,Balance Training,Gait Training,Home Exercise Program,Functional Mobility Training,Stair training,Safety Education & Training    Patient Education  Patient Education: Plan of Care, Reviewed Prior Education, Verbal Exercise Instruction    Goals:  Patient goals : \"be able to be self sufficient\"  Short term goals  Time Frame for Short term goals: 1 week  Short term goal 1: Patient will complete supine < > sit with SBA to transfer in/out of bed with decreased difficulty. Short term goal 2: Patient will complete sit < > stand with SBA with a RW to stand to ambulate with decreased difficulty. MET, SEE LTG  Short term goal 3: Patient will ambulate Deandre Viri 1560' with a RW and SBA to progress towards navigating home safely. MET, SEE LTG  Short term goal 4: Patient will ascend/descend 2, 6\" step with a RW with CGA to progress towards safe home entry. MET, SEE LTG  Short term goal 5: Patient will improve tinetti to greater than or equal to 19/28 to reduce her risk for falls. MET, SEE LTG  Long term goals  Time Frame for Long term goals : 2 weeks  Long term goal 1: Patient will complete supine  < > sit with modified independence to transfer in/out of bed safely. Long term goal 2: Patient will sit < > stand with modified independence to stand to ambulate safely. Long term goal 3: Patient will ambulate 80' with a RW and modified independence to navigate home safely.   Long term goal 4: Patient will ascend/descend 3, 6\" steps with a RW and modified independence for safe home access. Long term goal 5: Patient will ascend/descend 2, 6\" steps with a hand rail and modified independence for garage entry. Long term goal 6: Patient will complete car transfer with modified independence to transfer in/out of vehicle safely. Long term goal 7: Patient will improve tinetti to greater than or equal to 24/28 to reduce her risk for falls    Following session, patient left in safe position with all fall risk precautions in place.

## 2022-02-20 NOTE — PLAN OF CARE
Problem: Falls - Risk of:  Goal: Will remain free from falls  Description: Will remain free from falls  Outcome: Ongoing  Sleep pattern has improved since admission. Problem: Infection - Surgical Site:  Goal: Will show no infection signs and symptoms  Description: Will show no infection signs and symptoms  Outcome: Ongoing  No signs or symptoms of infection noted. Problem: Venous Thromboembolism:  Goal: Absence of signs or symptoms of impaired coagulation  Description: Absence of signs or symptoms of impaired coagulation  Outcome: Ongoing  No signs or symptoms of DVT noted. Negative estella's sign. Problem: Pain:  Goal: Pain level will decrease  Description: Pain level will decrease  Outcome: Ongoing  Pain decreases with rest, repositioning, ice application, and prn pain medications. Problem: Bleeding:  Goal: Will show no signs and symptoms of excessive bleeding  Description: Will show no signs and symptoms of excessive bleeding  Outcome: Ongoing  No signs of excessive bleeding noted this shift. Problem: Activity:  Goal: Sleeping patterns will improve  Description: Sleeping patterns will improve  Outcome: Ongoing  Sleep pattern has improved since admission.

## 2022-02-20 NOTE — PROGRESS NOTES
[address]  Occupational Therapy  Daily Note  Time:   Time In: 1030  Time Out: 1110  Timed Code Treatment Minutes: 40 Minutes  Minutes: 40          Date: 2022  Patient Name: Oliver Haley,   Gender: female      Room: [address] [place], [place] [address]-  MRN: [id number]  : 1945  (77 y.o.)  Referring Practitioner: LUANN Richter  Diagnosis: spinal stenosis of lumbar region with radiculopathy  Additional Pertinent Hx: Per EMR \"Gail is a 75y/o female known to our office having been seen in the past several months with complaints of low back pain and leg pain. She has been through multiple epidural injections, most recently last month, with some mild-moderate relief of her pain. Over the past several days her pain had been worsening significantly to the point it was completely uncontrolled and her mobility had declined significantly. She called our office and the only recommendation was for her to present to the ED for evaluation and admission for further care. She does have a history of previous lumbar spine surgery roughly 20yrs ago. History of pacemaker, afib and is on plavix as well. She describes a sharp pain traveling from the low back into the left lateral/anterior thigh are. No new changes in bowel/bladder continence. \" L1-L4 Laminectomy with complication (durotomy) on     Restrictions/Precautions:  Restrictions/Precautions: General Precautions,Fall Risk,Surgical Protocols  Required Braces or Orthoses  Spinal: Lumbar Corset  Position Activity Restriction  Spinal Precautions: No Bending,No Lifting,No Twisting  Other position/activity restrictions: L1-L4 Laminectomy with complication (durotomy) on      SUBJECTIVE: Up in chair upon arrival, agreeable to OT session    PAIN: 0/10:     Vitals: Vitals not assessed per clinical judgement, see nursing flowsheet    COGNITION: WNL    ADL:   Able to sherrie/doff back brace with set up.     BED MOBILITY:  Sit to Supine: Stand By Assistance HOB flat and used bedrail and log roll technique    TRANSFERS:  Sit to Stand:  Minimal Assistance. bedside chair and chair without arms  Stand to Sit: Stand By Assistance. FUNCTIONAL MOBILITY:  Assistive Device: Rolling Walker  Assist Level:  Stand By Assistance. Distance: To and from therapy apartment     ADDITIONAL ACTIVITIES:  Patient completed IADL homemaking task this date of preparing cup of tea - task was graded to challenge walker safety. Patient was able to retrieve all items from counter and first shelf with Supervision and 0 VCs for safety during the retrieval and transportation of items. Patient required supervision throughout task with a standing endurance of greater than 5 minutes. Reviewed equipment for home and for possible adaptations due to digit deviations   ASSESSMENT:     Activity Tolerance:  Patient tolerance of  treatment: good. Discharge Recommendations: Continue to assess pending progress  Equipment Recommendations: Equipment Needed: Yes  Mobility Devices: ADL Assistive Devices  Other: patient would be interested in 2 w/w tray if discharged with a 2 w/w for functional mobility. Pt. Reports she does have a shower chair with a back available if needed. May benefit from long handled reacher, dressing stick and long handled sponge. Plan: Times per week: 5 x / wk for 90 minutes, 1 x / wk for 30 minutes  Times per day: Daily  Current Treatment Recommendations: Denise Lenz Re-education,Patient/Caregiver Education & Training,Self-Care / ADL,Equipment Evaluation, Education, & procurement,Safety Education & Training    Patient Education  Patient Education: IADL's    Goals  Short term goals  Time Frame for Short term goals: by discharge  Short term goal 1: patient will tolerate 7 min functional standing with two hand release with (S) to increase activity tolerance for grooming and toileting.   Short term goal 2: Pt. to complete sinkside grooming with set up/clean up assistance with good carryover reaching items without twisting. Short term goal 3: Discontinued  Short term goal 4: Discontinued  Short term goal 5: patient will complete toileting routine with (S) and 0-1 verbal cues for spinal precautions. Long term goals  Time Frame for Long term goals : 2 weeks  Long term goal 1: patient will complete tub transfer with (S). Long term goal 2: patient will complete light meal prep task with MOD I within spinal precautions. Long term goal 3: patient will complete grocery shopping task with MOD I using strategies to reach items within spinal precautions. Long term goal 4: patient will complete ADL routine with MOD I with use of AE PRN. Long term goal 5: patient will retrieve items from various heights with least restrictive device and AE PRN with MOD I to safely transition to prior living environment to complete IADLs. Following session, patient left in safe position with all fall risk precautions in place.

## 2022-02-21 ENCOUNTER — APPOINTMENT (OUTPATIENT)
Dept: GENERAL RADIOLOGY | Age: 77
DRG: 559 | End: 2022-02-21
Attending: PHYSICAL MEDICINE & REHABILITATION
Payer: MEDICARE

## 2022-02-21 LAB
ANION GAP SERPL CALCULATED.3IONS-SCNC: 11 MEQ/L (ref 8–16)
BASOPHILS # BLD: 0.6 %
BASOPHILS ABSOLUTE: 0 THOU/MM3 (ref 0–0.1)
BUN BLDV-MCNC: 18 MG/DL (ref 7–22)
CALCIUM SERPL-MCNC: 9.3 MG/DL (ref 8.5–10.5)
CHLORIDE BLD-SCNC: 101 MEQ/L (ref 98–111)
CO2: 28 MEQ/L (ref 23–33)
CREAT SERPL-MCNC: 0.4 MG/DL (ref 0.4–1.2)
EOSINOPHIL # BLD: 2.3 %
EOSINOPHILS ABSOLUTE: 0.2 THOU/MM3 (ref 0–0.4)
ERYTHROCYTE [DISTWIDTH] IN BLOOD BY AUTOMATED COUNT: 13.8 % (ref 11.5–14.5)
ERYTHROCYTE [DISTWIDTH] IN BLOOD BY AUTOMATED COUNT: 49.2 FL (ref 35–45)
GFR SERPL CREATININE-BSD FRML MDRD: > 90 ML/MIN/1.73M2
GLUCOSE BLD-MCNC: 90 MG/DL (ref 70–108)
HCT VFR BLD CALC: 34.6 % (ref 37–47)
HEMOGLOBIN: 10.4 GM/DL (ref 12–16)
IMMATURE GRANS (ABS): 0.03 THOU/MM3 (ref 0–0.07)
IMMATURE GRANULOCYTES: 0.4 %
LYMPHOCYTES # BLD: 33.4 %
LYMPHOCYTES ABSOLUTE: 2.6 THOU/MM3 (ref 1–4.8)
MCH RBC QN AUTO: 29.7 PG (ref 26–33)
MCHC RBC AUTO-ENTMCNC: 30.1 GM/DL (ref 32.2–35.5)
MCV RBC AUTO: 98.9 FL (ref 81–99)
MONOCYTES # BLD: 9.3 %
MONOCYTES ABSOLUTE: 0.7 THOU/MM3 (ref 0.4–1.3)
NUCLEATED RED BLOOD CELLS: 0 /100 WBC
PLATELET # BLD: 302 THOU/MM3 (ref 130–400)
PMV BLD AUTO: 10.4 FL (ref 9.4–12.4)
POTASSIUM SERPL-SCNC: 3.9 MEQ/L (ref 3.5–5.2)
RBC # BLD: 3.5 MILL/MM3 (ref 4.2–5.4)
SEG NEUTROPHILS: 54 %
SEGMENTED NEUTROPHILS ABSOLUTE COUNT: 4.2 THOU/MM3 (ref 1.8–7.7)
SODIUM BLD-SCNC: 140 MEQ/L (ref 135–145)
WBC # BLD: 7.8 THOU/MM3 (ref 4.8–10.8)

## 2022-02-21 PROCEDURE — 97110 THERAPEUTIC EXERCISES: CPT

## 2022-02-21 PROCEDURE — 6370000000 HC RX 637 (ALT 250 FOR IP): Performed by: PHYSICAL MEDICINE & REHABILITATION

## 2022-02-21 PROCEDURE — 6370000000 HC RX 637 (ALT 250 FOR IP): Performed by: NURSE PRACTITIONER

## 2022-02-21 PROCEDURE — 97530 THERAPEUTIC ACTIVITIES: CPT

## 2022-02-21 PROCEDURE — 36415 COLL VENOUS BLD VENIPUNCTURE: CPT

## 2022-02-21 PROCEDURE — 85025 COMPLETE CBC W/AUTO DIFF WBC: CPT

## 2022-02-21 PROCEDURE — 1180000000 HC REHAB R&B

## 2022-02-21 PROCEDURE — 97116 GAIT TRAINING THERAPY: CPT

## 2022-02-21 PROCEDURE — 97535 SELF CARE MNGMENT TRAINING: CPT

## 2022-02-21 PROCEDURE — 73502 X-RAY EXAM HIP UNI 2-3 VIEWS: CPT

## 2022-02-21 PROCEDURE — 74018 RADEX ABDOMEN 1 VIEW: CPT

## 2022-02-21 PROCEDURE — 99232 SBSQ HOSP IP/OBS MODERATE 35: CPT | Performed by: NURSE PRACTITIONER

## 2022-02-21 PROCEDURE — 80048 BASIC METABOLIC PNL TOTAL CA: CPT

## 2022-02-21 RX ADMIN — ACETAMINOPHEN 650 MG: 325 TABLET ORAL at 15:01

## 2022-02-21 RX ADMIN — TRAMADOL HYDROCHLORIDE 50 MG: 50 TABLET, COATED ORAL at 12:55

## 2022-02-21 RX ADMIN — DIGOXIN 125 MCG: 125 TABLET ORAL at 10:23

## 2022-02-21 RX ADMIN — Medication 1 EACH: at 22:26

## 2022-02-21 RX ADMIN — DIAZEPAM 5 MG: 5 TABLET ORAL at 02:39

## 2022-02-21 RX ADMIN — Medication 1 EACH: at 10:35

## 2022-02-21 RX ADMIN — POLYETHYLENE GLYCOL 3350 17 G: 17 POWDER, FOR SOLUTION ORAL at 15:04

## 2022-02-21 RX ADMIN — CLOPIDOGREL BISULFATE 75 MG: 75 TABLET, FILM COATED ORAL at 10:23

## 2022-02-21 RX ADMIN — HYDROCODONE BITARTRATE AND ACETAMINOPHEN 1 TABLET: 5; 325 TABLET ORAL at 07:48

## 2022-02-21 RX ADMIN — PANTOPRAZOLE SODIUM 40 MG: 40 TABLET, DELAYED RELEASE ORAL at 05:48

## 2022-02-21 RX ADMIN — Medication 400 MCG: at 22:26

## 2022-02-21 RX ADMIN — METOPROLOL TARTRATE 37.5 MG: 25 TABLET, FILM COATED ORAL at 10:23

## 2022-02-21 RX ADMIN — CETIRIZINE HYDROCHLORIDE 10 MG: 10 TABLET, FILM COATED ORAL at 10:23

## 2022-02-21 RX ADMIN — TRAMADOL HYDROCHLORIDE 50 MG: 50 TABLET, COATED ORAL at 05:48

## 2022-02-21 RX ADMIN — DIAZEPAM 5 MG: 5 TABLET ORAL at 08:44

## 2022-02-21 RX ADMIN — ACETAMINOPHEN 650 MG: 325 TABLET ORAL at 02:39

## 2022-02-21 RX ADMIN — Medication 250 MG: at 12:55

## 2022-02-21 RX ADMIN — DIAZEPAM 5 MG: 5 TABLET ORAL at 15:02

## 2022-02-21 RX ADMIN — ATORVASTATIN CALCIUM 10 MG: 10 TABLET, FILM COATED ORAL at 22:24

## 2022-02-21 RX ADMIN — Medication 3 MG: at 22:26

## 2022-02-21 RX ADMIN — SENNOSIDES AND DOCUSATE SODIUM 1 TABLET: 50; 8.6 TABLET ORAL at 22:26

## 2022-02-21 RX ADMIN — Medication 2 EACH: at 10:34

## 2022-02-21 RX ADMIN — NALOXEGOL OXALATE 12.5 MG: 12.5 TABLET, FILM COATED ORAL at 10:23

## 2022-02-21 RX ADMIN — SENNOSIDES AND DOCUSATE SODIUM 1 TABLET: 50; 8.6 TABLET ORAL at 10:23

## 2022-02-21 RX ADMIN — TRAMADOL HYDROCHLORIDE 50 MG: 50 TABLET, COATED ORAL at 18:56

## 2022-02-21 ASSESSMENT — PAIN DESCRIPTION - DESCRIPTORS
DESCRIPTORS: DISCOMFORT
DESCRIPTORS: ACHING;DISCOMFORT

## 2022-02-21 ASSESSMENT — PAIN SCALES - GENERAL
PAINLEVEL_OUTOF10: 1
PAINLEVEL_OUTOF10: 4
PAINLEVEL_OUTOF10: 5
PAINLEVEL_OUTOF10: 0
PAINLEVEL_OUTOF10: 5
PAINLEVEL_OUTOF10: 5
PAINLEVEL_OUTOF10: 4
PAINLEVEL_OUTOF10: 0
PAINLEVEL_OUTOF10: 10
PAINLEVEL_OUTOF10: 9
PAINLEVEL_OUTOF10: 5
PAINLEVEL_OUTOF10: 10
PAINLEVEL_OUTOF10: 4
PAINLEVEL_OUTOF10: 6
PAINLEVEL_OUTOF10: 0
PAINLEVEL_OUTOF10: 8

## 2022-02-21 ASSESSMENT — PAIN DESCRIPTION - ONSET
ONSET: SUDDEN

## 2022-02-21 ASSESSMENT — PAIN - FUNCTIONAL ASSESSMENT
PAIN_FUNCTIONAL_ASSESSMENT: ACTIVITIES ARE NOT PREVENTED

## 2022-02-21 ASSESSMENT — PAIN DESCRIPTION - LOCATION
LOCATION: GROIN

## 2022-02-21 ASSESSMENT — PAIN DESCRIPTION - ORIENTATION
ORIENTATION: LEFT

## 2022-02-21 ASSESSMENT — PAIN DESCRIPTION - PROGRESSION
CLINICAL_PROGRESSION: GRADUALLY IMPROVING

## 2022-02-21 ASSESSMENT — PAIN DESCRIPTION - FREQUENCY
FREQUENCY: CONTINUOUS

## 2022-02-21 NOTE — PROGRESS NOTES
[address]  Occupational Therapy  Daily Note  Time:    Time In: 1100  Time Out: 1130  Timed Code Treatment Minutes: 30 Minutes  Minutes: 30          Date: 2022  Patient Name: Sebastian Huertas,   Gender: female      Room: [facility]/059-A  MRN: [id number]  : 1945  (77 y.o.)  Referring Practitioner: LUANN Kaye  Diagnosis: spinal stenosis of lumbar region with radiculopathy  Additional Pertinent Hx: Per EMR \"Gail is a 75y/o female known to our office having been seen in the past several months with complaints of low back pain and leg pain. She has been through multiple epidural injections, most recently last month, with some mild-moderate relief of her pain. Over the past several days her pain had been worsening significantly to the point it was completely uncontrolled and her mobility had declined significantly. She called our office and the only recommendation was for her to present to the ED for evaluation and admission for further care. She does have a history of previous lumbar spine surgery roughly 20yrs ago. History of pacemaker, afib and is on plavix as well. She describes a sharp pain traveling from the low back into the left lateral/anterior thigh are. No new changes in bowel/bladder continence. \" L1-L4 Laminectomy with complication (durotomy) on     Restrictions/Precautions:  Restrictions/Precautions: General Precautions,Fall Risk,Surgical Protocols  Required Braces or Orthoses  Spinal: Lumbar Corset  Position Activity Restriction  Spinal Precautions: No Bending,No Lifting,No Twisting  Other position/activity restrictions: L1-L4 Laminectomy with complication (durotomy) on       SUBJECTIVE: Pt was up in recliner upon arrival, agreeable to OT with min encouragement. PAIN: 8/10: groin    Vitals: Vitals not assessed per clinical judgement, see nursing flowsheet    COGNITION: WFL    ADL:   Grooming: Stand By Assistance.   for washing hands at sink  Toileting: Stand By Assistance. Toilet Transfer: Stand By Assistance. Leona Aguilar BALANCE:  Sitting Balance:  Supervision. Standing Balance: Stand By Assistance. pt able to demo 1-2 UE release during cooking task    BED MOBILITY:  Not Tested    TRANSFERS:  Sit to Stand:  Stand By Assistance. from graded surfaces  Stand to Sit: Stand By Assistance. FUNCTIONAL MOBILITY:  Assistive Device: Rolling Walker  Assist Level:  Stand By Assistance. Distance: To and from therapy apartment and within kitchen  Slow pace, steady throughout, min fatigue noted. ADDITIONAL ACTIVITIES:  Pt completed IADL task of beginning to make spaghetti. Pt able to recall location of needed items. Min VC for maintaining back precautions with item retrieval.  Pt then prepared area by filling pot with water to start boiling. Min VC required for adaptive techniques to transport heavier items while using RW for safety and balance. Pt completed tasks with SBA and min VC for safety. Pt completed task when Dominick Mayes arrived at 11:30 for additional 30min OT.     ASSESSMENT:     Activity Tolerance:  Patient tolerance of  treatment: good. Discharge Recommendations: Home with Home Health OT  Equipment Recommendations: Equipment Needed: Yes  Mobility Devices: ADL Assistive Devices  Other: patient would be interested in 2 w/w tray if discharged with a 2 w/w for functional mobility. Pt. Reports she does have a shower chair with a back available if needed. May benefit from long handled reacher, dressing stick and long handled sponge.   Plan: Times per week: 5 x / wk for 90 minutes, 1 x / wk for 30 minutes  Times per day: Daily  Current Treatment Recommendations: Reji Santos Re-education,Patient/Caregiver Education & Training,Self-Care / ADL,Equipment Evaluation, Education, & procurement,Safety Education & Training    Patient Education  Patient Education: Plan of Care, ADL's and Home Safety    Goals  Short term goals  Time Frame for Short term goals: by discharge  Short term goal 1: patient will tolerate 7 min functional standing with two hand release with (S) to increase activity tolerance for grooming and toileting. Short term goal 2: Pt. to complete sinkside grooming with set up/clean up assistance with good carryover reaching items without twisting. Short term goal 3: Discontinued  Short term goal 4: Discontinued  Short term goal 5: patient will complete toileting routine with (S) and 0-1 verbal cues for spinal precautions. Long term goals  Time Frame for Long term goals : 2 weeks  Long term goal 1: patient will complete tub transfer with (S). Long term goal 2: patient will complete light meal prep task with MOD I within spinal precautions. Long term goal 3: patient will complete grocery shopping task with MOD I using strategies to reach items within spinal precautions. Long term goal 4: patient will complete ADL routine with MOD I with use of AE PRN. Long term goal 5: patient will retrieve items from various heights with least restrictive device and AE PRN with MOD I to safely transition to prior living environment to complete IADLs. Following session, patient left in safe position with all fall risk precautions in place.

## 2022-02-21 NOTE — PROGRESS NOTES
[facility]  INPATIENT PHYSICAL THERAPY  DAILY NOTE  3 [facility]    Time In: 1430  Time Out: 1500  Timed Code Treatment Minutes: 30 Minutes  Minutes: 30          Date: 2022  Patient Name: Jacklyn Torres,  Gender:  female        MRN: [id number]  : 1945  (77 y.o.)  Referral Date : 22  Referring Practitioner: LUANN Verma (referring), Sidra Curtis MD  Diagnosis: Spinal stenosis of lumbar region with radiculopathy  Additional Pertinent Hx: Per H&P: \"Gail Noonan  is a 77 y.o. right-handed  female with a history of hypertension, GERD, hyperlipidemia, thyroid nodule, atrial fibrillation requiring WATCHMAN procedure, rheumatoid arthritis with bilateral hand fingers deformity, COPD and emphysema, diverticulosis, coronary artery disease requiring coronary arteries angioplasty and stenting, congestive heart failure, anemia, status post pacemaker placement, status post bilateral total knee arthroplasties and bilateral total hip arthroplasty, status post multiple bilateral feet surgeries for deformity, status post lumbar spine surgeries in  and , status post appendectomy, hysterectomy, hemorrhoidectomy, is admitted to the inpatient rehabilitation unit on 2022 for intensive inpatient rehabilitation treatment of impaired ADLs and ambulation secondary to lumbar spinal stenosis with left lower extremity radiculopathy requiring lumbar laminectomy and disc excision on 2022 by Dr. Ra Estrada. \"  Pt transferred to inpatient rehab 22.      Prior Level of Function:  Lives With: Alone  Type of Home: House  Home Layout: One level  Home Access: Stairs to enter without rails  Entrance Stairs - Number of Steps: 2 steps to garage with left hand rail, 1 + 1 +1 step into home  Home Equipment: Rolling walker,Standard walker,Wheelchair-manual,Cane   Bathroom Shower/Tub: Tub/Shower unit  Bathroom Toilet: Handicap height  Bathroom Equipment: Grab bars in shower (reports she has access to a shower chair with a back)  Bathroom Accessibility: Accessible (patient states she will have difficulty getting a 2 w/w into the BR at home. will have to side step or furniture walk)    Receives Help From: Friend(s)  ADL Assistance: [address]: Independent  Homemaking Responsibilities: Yes  Ambulation Assistance: Independent  Transfer Assistance: Independent  Active : Yes  Additional Comments: Pt IND prior, family in the area to help as needed. Pt was using cane about 1-2 weeks prior to admission due to pain    Restrictions/Precautions:  Restrictions/Precautions: General Precautions,Fall Risk,Surgical Protocols  Required Braces or Orthoses  Spinal: Lumbar Corset  Position Activity Restriction  Spinal Precautions: No Bending,No Lifting,No Twisting  Other position/activity restrictions: L1-L4 Laminectomy with complication (durotomy) on 2/4     SUBJECTIVE: Patient in therapy gym finished with OT upon arrival and agreeable to therapy. PAIN: 4/10: abdominal pain. Vitals: Vitals not assessed per clinical judgement, see nursing flowsheet    OBJECTIVE:  Bed Mobility:  Sit to Supine: Stand By Assistance, with head of bed raised, with increased time for completion     Transfers:  Sit to Stand: Supervision  Stand to Sit:Supervision    Ambulation:  Supervision  Distance: 100ft, 120ft  Surface: Level Tile  Device:Rolling Walker  Gait Deviations:  Slow Tianna, Decreased Step Length Bilaterally and Decreased Gait Speed    Stairs:  Supervision  Number of Steps: 1 + 1 platform steps to simulate home  Height: 6\" step with Rolling Walker    Exercise:  Patient was guided in 1 set(s) 12 reps of exercise to both lower extremities. Seated marches, Seated hamstring curls, Seated heel/toe raises, Long arc quads and Seated isometric hip adduction. Exercises were completed for increased independence with functional mobility.     Functional Outcome Measures: Not completed       ASSESSMENT:  Assessment: Patient progressing toward established goals. Activity Tolerance:  Patient tolerance of  treatment: good. Equipment Recommendations:Equipment Needed: No  Discharge Recommendations: Home with Home Health PT  Plan: Times per week: 5x/wk 90min, 1x/wk 30min  Times per day: Daily  Plan weeks: 2  Current Treatment Recommendations: Bettina Nunn Re-education,Patient/Caregiver Education & Training,Balance Training,Gait Training,Home Exercise Program,Functional Mobility Training,Stair training,Safety Education & Training    Patient Education  Patient Education: Plan of Care, [employer], Transfers, Gait, Stairs, Verbal Exercise Instruction    Goals:  Patient goals : \"be able to be self sufficient\"  Short term goals  Time Frame for Short term goals: 1 week  Short term goal 1: Patient will complete supine < > sit with SBA to transfer in/out of bed with decreased difficulty. Short term goal 2: Patient will complete sit < > stand with SBA with a RW to stand to ambulate with decreased difficulty. MET, SEE LTG  Short term goal 3: Patient will ambulate 80' with a RW and SBA to progress towards navigating home safely. MET, SEE LTG  Short term goal 4: Patient will ascend/descend 2, 6\" step with a RW with CGA to progress towards safe home entry. MET, SEE LTG  Short term goal 5: Patient will improve tinetti to greater than or equal to 19/28 to reduce her risk for falls. MET, SEE LTG  Long term goals  Time Frame for Long term goals : 2 weeks  Long term goal 1: Patient will complete supine  < > sit with modified independence to transfer in/out of bed safely. Long term goal 2: Patient will sit < > stand with modified independence to stand to ambulate safely. Long term goal 3: Patient will ambulate 80' with a RW and modified independence to navigate home safely.   Long term goal 4: Patient will ascend/descend 3, 6\" steps with a RW and modified independence for safe home access. Long term goal 5: Patient will ascend/descend 2, 6\" steps with a hand rail and modified independence for garage entry. Long term goal 6: Patient will complete car transfer with modified independence to transfer in/out of vehicle safely. Long term goal 7: Patient will improve tinetti to greater than or equal to 24/28 to reduce her risk for falls    Following session, patient left in safe position with all fall risk precautions in place.

## 2022-02-21 NOTE — PROGRESS NOTES
[facility]  INPATIENT PHYSICAL THERAPY  DAILY NOTE  [address]    Time In: 1330  Time Out: 1400  Timed Code Treatment Minutes: 30 Minutes  Minutes: 30        Date: 2022  Patient Name: Rance Riedel,  Gender:  female        MRN: [id number]  : 1945  (77 y.o.)  Referral Date : 22  Referring Practitioner: LUANN Davison (referring), Rosie Stinson MD  Diagnosis: Spinal stenosis of lumbar region with radiculopathy  Additional Pertinent Hx: Per H&P: \"Gail Wyman  is a 77 y.o. right-handed  female with a history of hypertension, GERD, hyperlipidemia, thyroid nodule, atrial fibrillation requiring WATCHMAN procedure, rheumatoid arthritis with bilateral hand fingers deformity, COPD and emphysema, diverticulosis, coronary artery disease requiring coronary arteries angioplasty and stenting, congestive heart failure, anemia, status post pacemaker placement, status post bilateral total knee arthroplasties and bilateral total hip arthroplasty, status post multiple bilateral feet surgeries for deformity, status post lumbar spine surgeries in  and , status post appendectomy, hysterectomy, hemorrhoidectomy, is admitted to the inpatient rehabilitation unit on 2022 for intensive inpatient rehabilitation treatment of impaired ADLs and ambulation secondary to lumbar spinal stenosis with left lower extremity radiculopathy requiring lumbar laminectomy and disc excision on 2022 by Dr. Jason Tong. \"  Pt transferred to inpatient rehab 22.      Prior Level of Function:  Lives With: Alone  Type of Home: House  Home Layout: One level  Home Access: Stairs to enter without rails  Entrance Stairs - Number of Steps: 2 steps to garage with left hand rail, 1 + 1 +1 step into home  Home Equipment: Rolling walker,Standard walker,Wheelchair-manual,Cane   Bathroom Shower/Tub: Tub/Shower unit  Bathroom Toilet: Handicap height  Bathroom Equipment: Grab bars in shower (reports she has access to a shower chair with a back)  Bathroom Accessibility: Accessible (patient states she will have difficulty getting a 2 w/w into the BR at home. will have to side step or furniture walk)    Receives Help From: Friend(s)  ADL Assistance: [address]: Independent  Homemaking Responsibilities: Yes  Ambulation Assistance: Independent  Transfer Assistance: Independent  Active : Yes  Additional Comments: Pt IND prior, family in the area to help as needed. Pt was using cane about 1-2 weeks prior to admission due to pain    Restrictions/Precautions:  Restrictions/Precautions: General Precautions,Fall Risk,Surgical Protocols  Required Braces or Orthoses  Spinal: Lumbar Corset  Position Activity Restriction  Spinal Precautions: No Bending,No Lifting,No Twisting  Other position/activity restrictions: L1-L4 Laminectomy with complication (durotomy) on 2/4     SUBJECTIVE: Pt was in bed upon arrival. Baylee Kaba still complaining of \"groin pain\" in the LLE. Pt describes it as feeling tight and not able to relax. Pt is constant and has not decreased from this morning from therapy. Pt agrees to therapy and is compliant. PAIN: See Subjective. Pain was not rated but reports pain pills have been given earlier but not much relief provided. Vitals: Vitals not assessed per clinical judgement, see nursing flowsheet    OBJECTIVE:  Bed Mobility:  Rolling to Left: Stand By Assistance   Supine to Sit: Stand By Assistance    Transfers:  Sit to Stand: Stand By Assistance  Stand to Sit:Stand By Assistance    Ambulation:  Stand By Assistance  Distance: 83 feet; 6 feet   Surface: Level Tile  Device:Rolling Walker  Gait Deviations:   Forward Flexed Posture, Slow Tianna, Decreased Gait Speed, Decreased Heel Strike Bilaterally, Narrow Base of Support, Mild Path Deviations, Unsteady Gait and Decreased Terminal Knee Extension    Exercise:  Patient was guided in 1 set(s) term goal 1: Patient will complete supine  < > sit with modified independence to transfer in/out of bed safely. Long term goal 2: Patient will sit < > stand with modified independence to stand to ambulate safely. Long term goal 3: Patient will ambulate 80' with a RW and modified independence to navigate home safely. Long term goal 4: Patient will ascend/descend 3, 6\" steps with a RW and modified independence for safe home access. Long term goal 5: Patient will ascend/descend 2, 6\" steps with a hand rail and modified independence for garage entry. Long term goal 6: Patient will complete car transfer with modified independence to transfer in/out of vehicle safely. Long term goal 7: Patient will improve tinetti to greater than or equal to 24/28 to reduce her risk for falls    Following session, patient left in safe position with all fall risk precautions in place.     Treatment session and note completed by PEACE Ge under supervision of signing therapist.

## 2022-02-21 NOTE — PROGRESS NOTES
[facility]  INPATIENT PHYSICAL THERAPY  DAILY NOTE  [address] [facility],4Th Floor - [address]59[address]059[address]    Time In: 0830  Time Out: 0900  Timed Code Treatment Minutes: 18 Minutes  Minutes: 30     Minute Variance  Variance: 43   *patient c/o of lower left abdominal/groin pain, NP in to assess patient and ordered xray. Transport arrived to take patient to xray during session. Date: 2022  Patient Name: Luisito Rasheed,  Gender:  female        MRN: [id number]  : 1945  (77 y.o.)  Referral Date : 22  Referring Practitioner: LUANN Larsen (referring), Nallely Mon MD  Diagnosis: Spinal stenosis of lumbar region with radiculopathy  Additional Pertinent Hx: Per H&P: \"Gail Kennedy  is a 77 y.o. right-handed  female with a history of hypertension, GERD, hyperlipidemia, thyroid nodule, atrial fibrillation requiring WATCHMAN procedure, rheumatoid arthritis with bilateral hand fingers deformity, COPD and emphysema, diverticulosis, coronary artery disease requiring coronary arteries angioplasty and stenting, congestive heart failure, anemia, status post pacemaker placement, status post bilateral total knee arthroplasties and bilateral total hip arthroplasty, status post multiple bilateral feet surgeries for deformity, status post lumbar spine surgeries in  and , status post appendectomy, hysterectomy, hemorrhoidectomy, is admitted to the inpatient rehabilitation unit on 2022 for intensive inpatient rehabilitation treatment of impaired ADLs and ambulation secondary to lumbar spinal stenosis with left lower extremity radiculopathy requiring lumbar laminectomy and disc excision on 2022 by Dr. Edgar Ramos. \"  Pt transferred to inpatient rehab 22.      Prior Level of Function:  Lives With: Alone  Type of Home: House  Home Layout: One level  Home Access: Stairs to enter without rails  Entrance Stairs - Number of Steps: 2 steps to garage with left hand rail, 1 + 1 +1 step into home  Home Equipment: Rolling walker,Standard walker,Wheelchair-manual,Cane   Bathroom Shower/Tub: Tub/Shower unit  Bathroom Toilet: Handicap height  Bathroom Equipment: Grab bars in shower (reports she has access to a shower chair with a back)  Bathroom Accessibility: Accessible (patient states she will have difficulty getting a 2 w/w into the BR at home. will have to side step or furniture walk)    Receives Help From: Friend(s)  ADL Assistance: [address] [place] [address] Avenue: Independent  Homemaking Responsibilities: Yes  Ambulation Assistance: Independent  Transfer Assistance: Independent  Active : Yes  Additional Comments: Pt IND prior, family in the area to help as needed. Pt was using cane about 1-2 weeks prior to admission due to pain    Restrictions/Precautions:  Restrictions/Precautions: General Precautions,Fall Risk,Surgical Protocols  Required Braces or Orthoses  Spinal: Lumbar Corset  Position Activity Restriction  Spinal Precautions: No Bending,No Lifting,No Twisting  Other position/activity restrictions: L1-L4 Laminectomy with complication (durotomy) on 2/4     SUBJECTIVE: Patient in recliner upon arrival, c/o left lower abdominal/groin pain. NP notified and assessed. Xray ordered. RN in to give pain meds. Patient requested to use restroom before leaving for xray. PAIN: 10/10: left lower abdominal/groin region. Vitals: Vitals not assessed per clinical judgement, see nursing flowsheet    OBJECTIVE:  Bed Mobility:  Not Tested    Transfers:  Sit to Stand: Stand By Assistance, with increased time for completion  Stand to Sit:Stand By Assistance    Ambulation:  Stand By Assistance  Distance: 12ft, 5ft  Surface: Level Tile  Device:Rolling Walker  Gait Deviations: Forward Flexed Posture, Slow Tianna, Decreased Step Length Bilaterally and Decreased Gait Speed    Exercise:  Patient was guided in 1 set(s) 10 reps of exercise to both lower extremities.   Ankle pumps, safely. Long term goal 2: Patient will sit < > stand with modified independence to stand to ambulate safely. Long term goal 3: Patient will ambulate 80' with a RW and modified independence to navigate home safely. Long term goal 4: Patient will ascend/descend 3, 6\" steps with a RW and modified independence for safe home access. Long term goal 5: Patient will ascend/descend 2, 6\" steps with a hand rail and modified independence for garage entry. Long term goal 6: Patient will complete car transfer with modified independence to transfer in/out of vehicle safely. Long term goal 7: Patient will improve tinetti to greater than or equal to 24/28 to reduce her risk for falls    Following session, patient left in safe position with all fall risk precautions in place.

## 2022-02-21 NOTE — PLAN OF CARE
See full note filed 2/21 at 3:21PM.       Problem: Nutrition  Goal: Optimal nutrition therapy  Outcome: Ongoing     Nutrition Problem #1: Severe malnutrition  Intervention: Food and/or Nutrient Delivery: Continue Current Diet,Continue Oral Nutrition Supplement  Nutritional Goals: Patient to meet 75% or more at meals daily throughout LOS

## 2022-02-21 NOTE — PROGRESS NOTES
[facility] NOTE    Conference Date: 2022  Admit Date:  2022  3:36 PM  Patient Name: Trae Neri    MRN: [id number]    : 1945  (77 y.o.)  Rehabilitation Admitting Diagnosis:  Cerebrospinal fluid leak due to lumbar surgery [G97.82, G96.00]  Referring Practitioner: LUANN Butler (referring), Robert Payne MD      CASE MANAGEMENT  Current issues/needs regarding patient and family discharge status: Patient has progressed well with PT/OT. Plan for discharge home on Wednesday, 2022. Home health care services for RN/PT/OT/HHA arranged through [facility]. Patient's daughter-in-law, Danika Sanchez, scheduled for complete family education on Wednesday, 2022, prior to patient's discharge. SW to follow and maintain involvement in discharge planning. PHYSICAL THERAPY  Ms Gabrielle Acosta met 5/5 STG's and / LTG's. Patient has made good gains in PT over the last week, progressing supine < > sit from SBA/CGA to modified independence, sit < > stand from SBA to modified independence, gait from SBA to modified independence with a RW and a platform step with a RW and modified independence. Patient requires SBA/CGA to ascend/descend steps with 1 to 2 hand rails due to LE instability. Patient continues to demonstrate balance impairment as evidenced by tinetti score of 22/28 and trunk and LE weakness, and would benefit from continued skilled PT at discharge. Pt planning to discharge 22 to home. Pt would benefit from continued skilled PT services to maximize independence to facilitate safe discharge to home. Equipment Needed: No    SPEECH THERAPY       OCCUPATIONAL THERAPY  Pt. has progressed well with OT services to date. Pt. has progressed towards 6 minutes of standing during functional tasks.   Pt. demo improved safety/performance with toilet transfer/toileting task to Mod I. Pt. has progressed with full body dressing with set up/clean up assistance, pt able to don/doff brace with good safety. Pt. very attentive to spinal preucations and demo 100% accuracy with spinal precuations throughout sessions. Pt. plan is to return home with [facility] services to promote safe transitions to home. Pt. to benefit from further training during IADLs to further advance towards PLOF. Equipment Needed: Yes  Mobility Devices: ADL Assistive Devices  Other: Pt. Reports she will be utilizing her daughter in laws shower chair with a back for showering purposes. She will be ordering a sock aid, dressing stick and walker tray table. Pt. Reports she owns a reacher, raised toilet seat and long handled shoe horn. RECREATIONAL THERAPY  Patient has been offered participation in recreational therapy activities and participates as able. Pt enjoyed petting our pet therapy dog May this afternoon while sitting in her recliner-pleasant and social -Pt agreed to have a jigsaw puzzle and card table brought into her room to work on in her free time especially in the evenings and the weekend coming up-affect bright and social-has so many good things to say about her stay here and the assistance/ therapy  she is receiving  -Sit to stand from recliner with CGA-ambulated to the bathroom with RW and SBA-able to doff underwear and pants with SBA-call light within reach-pt appreciative of giving her a puzzle to work on in her free time in her room  over the weekend and in the evenings-affect bright and pleasant-assisted pt to clean her bottom after bm-she was able to wipe sarahi area and don her underwear and pants with SBA-stood to wash her hands with SBA-ambulated to her recliner with RW and SBA-comfort measures given-pt very social-talked about her family, politics, where she likes to shop and about  her new great grandson coming in April or May -Upon arrival pt falling asleep in her recliner-c/o pain in her L groin area -just got back from xray-reclined pt in chair, gave her ice bag for pain -states she hopes its not a blood clot-comfort measures given-Pt states she had a bm last night and one this am and that was where all of her pain was coming from the last two days-states she is feeling much better today and is brighter and not so zyjckhgr-psypwnsf-mahtdhonhb contact given      NUTRITION  Weight: 118 lb 3.2 oz (53.6 kg) / Body mass index is 21.61 kg/m². Current diet: ADULT DIET; Regular  ADULT ORAL NUTRITION SUPPLEMENT; Breakfast, Lunch, Dinner; Standard 4 oz Oral Supplement  ADULT ORAL NUTRITION SUPPLEMENT; Breakfast, Lunch, Dinner; Other Oral Supplement; activia and hot beverage at B,L and D  Please see nutrition note for details. NURSING  Continent of Bowel: Yes. Frequency: several times a day. Management: Dulcolax daily, Miralax, senna, movantik scheduled. Continent of Bladder: Yes. Frequency: several times a shift. Management: none. Pain is Managed:  Yes. Management: Tramadol 2-3 x daily, Tylenol several x per day PRN. Frequency of Intervention: see above. Adequately Controlled: Yes  Sleep: Adequate  Signs and Symptoms of Infection:  No.   Signs and Symptoms of Skin Breakdown:  No.   Injury and/or Falls during Inpatient Rehabilitation Admission: Yes  Anticoagulants: Plavix  Diabetic: No  Consultations/Labs/X-rays: none  Oxygen while on IP Rehab:  No Currently using  none liters per n/a  . Home oxygen: No    No results for input(s): POCGLU in the last 72 hours. No results found for: LDLCALC, LDLCHOLESTEROL, LDLDIRECT      Vitals:    02/22/22 0810 02/22/22 0954 02/22/22 2023 02/23/22 0908   BP: (!) 114/58  (!) 133/57    Pulse: 65  68    Resp: 18  14 16   Temp: 98.2 °F (36.8 °C)  98.2 °F (36.8 °C)    TempSrc: Oral  Oral    SpO2:  97% 97% 96%   Weight:       Height:              Family Education: Family available and participating in education   Fall Risk:  Falling star program initiated  Is the patient appropriate for a stay in the functional apartment? no    Discharge Plan   Estimated Discharge Date:  Today, 22  Destination: discharge home with supervision  Services at Discharge: [address], Occupational Therapy, Nursing and HHA 2x week  Is patient appropriate for an outpatient driving evaluation? no  Equipment at Discharge: Other: Pt. Reports she will be utilizing her daughter in laws shower chair with a back for showering purposes. She will be ordering a sock aid, dressing stick and walker tray table. Pt. Reports she owns a reacher, raised toilet seat and long handled shoe horn. Factors facilitating achievement of predicted outcomes: Family support, Motivated, Cooperative and Pleasant  Barriers to the achievement of predicted outcomes: Decreased endurance  Follow up with physiatrist? no     Team Members Present at Conference:  :Mallory Arnell Apgar, [place]  Occupational Therapist:Vickie  Physical Therapist:Shayla Prater, [address] [place] [address]  Speech Therapist:N/LUANNE Greene RN  Psychologist: Jimmy Cho, PhD.    I approve the established interdisciplinary plan of care as documented within the medical record of Dk Parra.     Jose Marie MD

## 2022-02-21 NOTE — PROGRESS NOTES
[address] [address]  Recreational Therapy  Daily Note  [address]    Time Spent with Patient: 10 minutes    Date:  2/21/2022       Patient Name: Zay Cramer      MRN: [id number]      YOB: 1945 (70 y.o.)       Gender: female  Diagnosis: spinal stenosis of lumbar region with radiculopathy  Referring Practitioner: LUANN Smith    RESTRICTIONS/PRECAUTIONS:  Restrictions/Precautions: General Precautions,Fall Risk,Surgical Protocols  Vision: Impaired  Hearing: Exceptions to [facility] Exceptions: Hard of hearing/hearing concerns    PAIN: in groin but no number given-    SUBJECTIVE:  I haven't slept well the last two nights due to groin pain    OBJECTIVE:  Upon arrival pt falling asleep in her recliner-c/o pain in her L groin area -just got back from xray-reclined pt in chair, gave her ice bag for pain -states she hopes its not a blood clot-comfort measures given         Patient Education  New Education Provided: Importance of Leisure,     Electronically signed by: TANISHA Perez  Date: 2/21/2022

## 2022-02-21 NOTE — PROGRESS NOTES
[address]  Occupational Therapy  Daily Note  Time:    Time In: 1400  Time Out: 1430  Timed Code Treatment Minutes: 30 Minutes  Minutes: 30          Date: 2022  Patient Name: Trae Neri,   Gender: female      Room: [facility]/059-A  MRN: [id number]  : 1945  (77 y.o.)  Referring Practitioner: LUANN Grubbs  Diagnosis: spinal stenosis of lumbar region with radiculopathy  Additional Pertinent Hx: Per EMR \"Gail is a 77y/o female known to our office having been seen in the past several months with complaints of low back pain and leg pain. She has been through multiple epidural injections, most recently last month, with some mild-moderate relief of her pain. Over the past several days her pain had been worsening significantly to the point it was completely uncontrolled and her mobility had declined significantly. She called our office and the only recommendation was for her to present to the ED for evaluation and admission for further care. She does have a history of previous lumbar spine surgery roughly 20yrs ago. History of pacemaker, afib and is on plavix as well. She describes a sharp pain traveling from the low back into the left lateral/anterior thigh are. No new changes in bowel/bladder continence. \" L1-L4 Laminectomy with complication (durotomy) on     Restrictions/Precautions:  Restrictions/Precautions: General Precautions,Fall Risk,Surgical Protocols  Required Braces or Orthoses  Spinal: Lumbar Corset  Position Activity Restriction  Spinal Precautions: No Bending,No Lifting,No Twisting  Other position/activity restrictions: L1-L4 Laminectomy with complication (durotomy) on       SUBJECTIVE: Pt was up in w/c finishing PT upon arrival, pleasant and agreeable to OT, requesting therex d/t groin pain. Pt then requesting functional mobility later in session.      PAIN: 8/10: c/o groin pain and aches in low back    Vitals: Vitals not assessed per clinical judgement, see nursing flowsheet    COGNITION: WFL    ADL:   No ADL's completed this session. Teodora Mccallum BALANCE:  Sitting Balance:  Supervision. Standing Balance: Stand By Assistance. BED MOBILITY:  Not Tested    TRANSFERS:  Sit to Stand:  Stand By Assistance. Stand to Sit: Stand By Assistance. FUNCTIONAL MOBILITY:  Assistive Device: Rolling Walker  Assist Level:  Stand By Assistance. Distance: within gym  Slow pace, steady throughout with no LOB. Mod c/o pain. ADDITIONAL ACTIVITIES:  Guided patient through BUE AROM with mod resistance band this date x12 reps x2 set in w/c in order to increase BUE strength and improve activity tolerance for ADLs and homemaking tasks. Pt required min VC for technique throughout. No rest breaks required. ASSESSMENT:     Activity Tolerance:  Patient tolerance of  treatment: good. Discharge Recommendations: Home with Home Health OT  Equipment Recommendations: Equipment Needed: Yes  Mobility Devices: ADL Assistive Devices  Other: patient would be interested in 2 w/w tray if discharged with a 2 w/w for functional mobility. Pt. Reports she does have a shower chair with a back available if needed. May benefit from long handled reacher, dressing stick and long handled sponge. Plan: Times per week: 5 x / wk for 90 minutes, 1 x / wk for 30 minutes  Times per day: Daily  Current Treatment Recommendations: Monika Jordan Re-education,Patient/Caregiver Education & Training,Self-Care / ADL,Equipment Evaluation, Education, & procurement,Safety Education & Training    Patient Education  Patient Education: Home Exercise Program    Goals  Short term goals  Time Frame for Short term goals: by discharge  Short term goal 1: patient will tolerate 7 min functional standing with two hand release with (S) to increase activity tolerance for grooming and toileting.   Short term goal 2: Pt. to complete sinkside grooming with set up/clean up assistance with good carryover reaching items without twisting. Short term goal 3: Discontinued  Short term goal 4: Discontinued  Short term goal 5: patient will complete toileting routine with (S) and 0-1 verbal cues for spinal precautions. Long term goals  Time Frame for Long term goals : 2 weeks  Long term goal 1: patient will complete tub transfer with (S). Long term goal 2: patient will complete light meal prep task with MOD I within spinal precautions. Long term goal 3: patient will complete grocery shopping task with MOD I using strategies to reach items within spinal precautions. Long term goal 4: patient will complete ADL routine with MOD I with use of AE PRN. Long term goal 5: patient will retrieve items from various heights with least restrictive device and AE PRN with MOD I to safely transition to prior living environment to complete IADLs. Following session, patient left in safe position with all fall risk precautions in place.

## 2022-02-21 NOTE — PROGRESS NOTES
[address]  Occupational Therapy  Daily Note  Time:   Time In: 1130  Time Out: 1200  Timed Code Treatment Minutes: 30 Minutes  Minutes: 30          Date: 2022  Patient Name: Annalee Greenwood,   Gender: female      Room: [facility]059-A  MRN: [id number]  : 1945  (77 y.o.)  Referring Practitioner: Charmayne Fortune, APRN-CNP  Diagnosis: spinal stenosis of lumbar region with radiculopathy  Additional Pertinent Hx: Per EMR \"Gail is a 77y/o female known to our office having been seen in the past several months with complaints of low back pain and leg pain. She has been through multiple epidural injections, most recently last month, with some mild-moderate relief of her pain. Over the past several days her pain had been worsening significantly to the point it was completely uncontrolled and her mobility had declined significantly. She called our office and the only recommendation was for her to present to the ED for evaluation and admission for further care. She does have a history of previous lumbar spine surgery roughly 20yrs ago. History of pacemaker, afib and is on plavix as well. She describes a sharp pain traveling from the low back into the left lateral/anterior thigh are. No new changes in bowel/bladder continence.  \" L1-L4 Laminectomy with complication (durotomy) on     Restrictions/Precautions:  Restrictions/Precautions: General Precautions,Fall Risk,Surgical Protocols  Required Braces or Orthoses  Spinal: Lumbar Corset  Position Activity Restriction  Spinal Precautions: No Bending,No Lifting,No Twisting  Other position/activity restrictions: L1-L4 Laminectomy with complication (durotomy) on      SUBJECTIVE: Pt up in therapy apartment for meal prep upon arrival, agreeable to transition OT session for this FREEMAN to assist.    PAIN: Did not rate: Continued c/o groin pain    Vitals: Vitals not assessed per clinical judgement, see nursing flowsheet    COGNITION: WFL    ADL:   No ADL's completed this session. Marquis Gonzalez BALANCE:  Sitting Balance:  Supervision. Standing Balance: Stand By Assistance. >5 minutes within IADL task    BED MOBILITY:  Not Tested    TRANSFERS:  Sit to Stand:  Stand By Assistance. Stand to Sit: Stand By Assistance. Comment: To from standard and bedside chairs. FUNCTIONAL MOBILITY:  Assistive Device: Rolling Walker   Assist Level:  Stand By Assistance. Distance:   Completed functional mobility within therapy kitchen at slow pace, no LOB noted. Pt requires no cues for walker safety- seated rest breaks during activity, min fatigue noted. ADDITIONAL ACTIVITIES:  Continued participation in IADL task to ambulate within therapy kitchen with use of RW making spagetti. Pt required SBA for balance and occasional cues for safe technique to improve transport of items or walker safety while reaching into cupboards. Completed to increase kitchen safety and facilitate functional reaching required for simple meal prep tasks. ASSESSMENT:     Activity Tolerance:  Patient tolerance of  treatment: good. Discharge Recommendations: Home with Home Health OT  Equipment Recommendations: Equipment Needed: Yes  Mobility Devices: ADL Assistive Devices  Other: patient would be interested in 2 w/w tray if discharged with a 2 w/w for functional mobility. Pt. Reports she does have a shower chair with a back available if needed. May benefit from long handled reacher, dressing stick and long handled sponge.   Plan: Times per week: 5 x / wk for 90 minutes, 1 x / wk for 30 minutes  Times per day: Daily  Current Treatment Recommendations: Isidro Grubbs Re-education,Patient/Caregiver Education & Training,Self-Care / ADL,Equipment Evaluation, Education, & procurement,Safety Education & Training    Patient Education  Patient Education: IADL's, Precautions, Assistive Device Safety and Safety with transfers and mobility. Goals  Short term goals  Time Frame for Short term goals: by discharge  Short term goal 1: patient will tolerate 7 min functional standing with two hand release with (S) to increase activity tolerance for grooming and toileting. Short term goal 2: Pt. to complete sinkside grooming with set up/clean up assistance with good carryover reaching items without twisting. Short term goal 3: Discontinued  Short term goal 4: Discontinued  Short term goal 5: patient will complete toileting routine with (S) and 0-1 verbal cues for spinal precautions. Long term goals  Time Frame for Long term goals : 2 weeks  Long term goal 1: patient will complete tub transfer with (S). Long term goal 2: patient will complete light meal prep task with MOD I within spinal precautions. Long term goal 3: patient will complete grocery shopping task with MOD I using strategies to reach items within spinal precautions. Long term goal 4: patient will complete ADL routine with MOD I with use of AE PRN. Long term goal 5: patient will retrieve items from various heights with least restrictive device and AE PRN with MOD I to safely transition to prior living environment to complete IADLs. Following session, patient left in safe position with all fall risk precautions in place.

## 2022-02-21 NOTE — DISCHARGE INSTR - COC
Continuity of Care Form    Patient Name: Ann Conti   :  1945  MRN:  [id number]    Admit date:  2022  Discharge date:  ***    Code Status Order: Full Code   Advance Directives:      Admitting Physician:  Chris Mcneil MD  PCP: Josie Rey MD    Discharging Nurse: [facility] Unit/Room#: 6E-56/200-A  Discharging Unit Phone Number: ***    Emergency Contact:   Extended Emergency Contact Information  Primary Emergency Contact: Raquel Lebron, [address] Phone: [phone]  Relation: Child  Secondary Emergency Contact: [address] Phone: [phone]  Relation: Child    Past Surgical History:  Past Surgical History:   Procedure Laterality Date    ABDOMINAL EXPLORATION SURGERY  1969    Exploratory Surgery Ovarian Cysts    [address]    \"L4 & L5 No Fusion\"    BACK SURGERY      \"L4 & L5 No Fusion\"    BLADDER SUSPENSION  1987    CHOLECYSTECTOMY, [address]  2005    Stent RAD    CORONARY ANGIOPLASTY  12/15/2005    Stent LAD    CORONARY ANGIOPLASTY  2009    Stent LAD    CORONARY ANGIOPLASTY WITH STENT PLACEMENT      total 4 stents    DENTAL SURGERY      Teeth Extracted In Past    ENDOSCOPY, COLON, DIAGNOSTIC  \"Two\"8-14, 1114    FOOT SURGERY Right     Right Foot And Toe    FOOT SURGERY Left     Left Foot/Toe Surgery (Screw In Left Great Toe)    FOOT SURGERY      Right Foot Surgery And Repair , Left Knee  Stitch Removed    FOOT SURGERY Left 10/21/2016    Arthroplasty Great Left Toe    HEMORRHOID SURGERY  1972    HYSTERECTOMY  1976    Partial Abdominal Hysterectomy    JOINT REPLACEMENT Left 1997    Total Left Knee    JOINT REPLACEMENT Right 1997    Total Right Knee    JOINT REPLACEMENT Left 2007    Total Left Knee    JOINT REPLACEMENT Right 2007    Total Right Hip    JOINT REPLACEMENT Left 2008    Total Left Hip    KNEE SURGERY Left     Left Knee Muscle Ruptured And Repaired    LAMINECTOMY N/A 02/04/2022    L2-L4 LAMINECTOMY performed by Bret Schulz MD at [facility] Right 12/05/2013    Right Foot Arthrodesis With Debridement    OTHER SURGICAL HISTORY Bilateral 03/11/2014    Debride Metatarsal Shaft Of Multiple Toes  Left 2&3  Right 2,3 & 4    OTHER SURGICAL HISTORY  1980    \"Both Ovaries Removed\"    OTHER SURGICAL HISTORY Left 2010    Left Tibia Derek Replaced    OTHER SURGICAL HISTORY Bilateral 02/18/2015    Removal hardware left great toe and removal plate and screws right foot    OTHER SURGICAL HISTORY  08/2020    heart WATCHMAN procedure    PACEMAKER PLACEMENT  2020       Immunization History:   Immunization History   Administered Date(s) Administered    COVID-19, Pfizer Purple top, DILUTE for use, 12+ yrs, 30mcg/0.3mL dose 05/14/2021, 06/07/2021, 11/19/2021       Active Problems:  Patient Active Problem List   Diagnosis Code    Angina, class III ([facility] [address] I20.9    Recurrent coronary arteriosclerosis following PTCA I25.10, Z98.61    ASHD (arteriosclerotic heart disease) I25.10    Hyperlipidemia E78.5    HTN (hypertension) I10    Rheumatoid arthritis ([facility] M06.9    COPD bronchitis [id number]    Chest pain R07.9    Spinal stenosis of lumbar region with radiculopathy M48.061, M54.16    Pre-op evaluation Z01.818    Abnormal EKG R94.31    Coronary artery disease involving native coronary artery of native heart without angina pectoris I25.10    Moderate malnutrition (HCC) E44.0    Midline low back pain without sciatica M54.50    Cerebrospinal fluid leak due to lumbar surgery G97.82, G96.00    Atrial fibrillation (HCC) I48.91    Anemia D64.9    [place]-neck deformity of finger of both hands M20.031, M20.032    Ulnar deviation of fingers of both hands M20.091, M20.092    H/O cardiac pacemaker Z95.0    COPD (chronic obstructive pulmonary disease) (HCC) J44.9    Emphysema lung (HCC) J43.9    H/O total knee replacement, bilateral Z96.653    H/O Discharge:   Respiratory Treatments: ***  Oxygen Therapy:  {Therapy; copd oxygen:46898}  Ventilator:    {MH CC Vent GFQB:171407636}    Rehab Therapies: {THERAPEUTIC INTERVENTION:5472504285}  Weight Bearing Status/Restrictions: [address]  Weight Bearin}  Other Medical Equipment (for information only, NOT a DME order):  {EQUIPMENT:683424871}  Other Treatments: ***    Patient's personal belongings (please select all that are sent with patient):  {CHELOP DME Belongings:809495307}    RN SIGNATURE:  {Esignature:[id number]}
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
## 2022-02-21 NOTE — PROGRESS NOTES
Patient: Cordelia Rosales  Unit/Bed: 8D-09/161-Z  YOB: 1945  MRN: [id number] Acct: [de-identified]   Admitting Diagnosis: Cerebrospinal fluid leak due to lumbar surgery [G97.82, G96.00]  Admit Date:  2/11/2022  Hospital Day: 10    Assessment:     Principal Problem:    Spinal stenosis of lumbar region with radiculopathy  Active Problems:    Hyperlipidemia    HTN (hypertension)    Rheumatoid arthritis ([facility] [address] [address]    Coronary artery disease involving native coronary artery of native heart without angina pectoris    Cerebrospinal fluid leak due to lumbar surgery    Atrial fibrillation ([facility])    Anemia    [place]-neck deformity of finger of both hands    Ulnar deviation of fingers of both hands    H/O cardiac pacemaker    COPD (chronic obstructive pulmonary disease) ([facility])    Emphysema lung ([facility])    H/O total knee replacement, bilateral    H/O total hip arthroplasty, bilateral    Severe malnutrition ([facility] [address]  Resolved Problems:    * No resolved hospital problems. *      Plan:     Continue to follow        Subjective:     Patient has no complaint of CP or SOB. .   Medication side effects: none    Scheduled Meds:   folic acid  212 mcg Oral Nightly    Multivitamin Gummies Adult  2 each Oral Daily    PreserVision AREDS 2  1 each Oral BID    cetirizine  10 mg Oral Daily    polyethylene glycol  17 g Oral Daily    bisacodyl  5 mg Oral Daily    sennosides-docusate sodium  1 tablet Oral BID    atorvastatin  10 mg Oral Nightly    clopidogrel  75 mg Oral QAM    digoxin  125 mcg Oral Daily    magnesium oxide  250 mg Oral Daily    methotrexate  22.5 mg Oral Weekly    metoprolol tartrate  37.5 mg Oral BID    naloxegol  12.5 mg Oral QAM    pantoprazole  40 mg Oral QAM AC     Continuous Infusions:  PRN Meds:traMADol, HYDROcodone 5 mg - acetaminophen **OR** [DISCONTINUED] HYDROcodone 5 mg - acetaminophen, acetaminophen, bisacodyl, magnesium hydroxide, diazePAM, magic (miracle) mouthwash, nitroGLYCERIN, ondansetron, melatonin    Review of Systems  Pertinent items are noted in HPI. Objective:     Patient Vitals for the past 8 hrs:   BP Temp Temp src Pulse Resp SpO2   02/20/22 2200 (!) 140/65 98.1 °F (36.7 °C) Oral 69 16 96 %   02/20/22 1650      95 %     I/O last 3 completed shifts:   In: 12 [P.O.:1510]  Out: -   I/O this shift:  In: 150 [P.O.:150]  Out: -     BP (!) 140/65   Pulse 69   Temp 98.1 °F (36.7 °C) (Oral)   Resp 16   Ht 5' 2.01\" (1.575 m)   Wt 118 lb 11.2 oz (53.8 kg)   SpO2 96%   BMI 21.70 kg/m²     General appearance: alert, appears stated age and cooperative  Head: Normocephalic, without obvious abnormality, atraumatic  Lungs: clear to auscultation bilaterally  Heart: regular rate and rhythm, S1, S2 normal, no murmur, click, rub or gallop  Abdomen: soft, non-tender; bowel sounds normal; no masses,  no organomegaly  Extremities: edema trace bilaterally  Skin: Skin color, texture, turgor normal. No rashes or lesions  Neurologic: weak    Electronically signed by Teri Chavez MD on 2/21/2022 at 12:41 AM

## 2022-02-21 NOTE — PROGRESS NOTES
Physical Medicine & Rehabilitation   Progress Note    Chief Complaint:  Lumbar stenosis. S/p lumbar espinoza with CSF leak. Rehab needs    Subjective:  Patient seen today, sitting up in chair. Patient with complains of pain in the left groin, lower abdomen pain. Patient states the pain started around 0530 this morning. Patient states it feels like a grabbing pain. Patient denies sharp, shooting pains. Patient was up walking on it and doesn't feel that the pain increased with walking. With palpation, the pain is very focal, just above the hip joint is the location of the only tenderness. Palpated bladder and abdomen without increased pain. Completed PROM with left hip without complaints of increased pain. Patient with +BMt his am, states good sized and formed. Patient states the BM as prior to when the pain started. Patient frustrated with assessment, states \"I'm not lying\", explained that assessment and questions are to help differentiate what might be causing the pain. Patient understanding. Xrays and meds ordered. Planned discharge from the inpatient rehabilitation unit to home which is scheduled for Wednesday, 2/23/22, with [address] to include Physical Therapy, Occupational Therapy, Nursing and HHA 2x week. Rehabilitation:  PT:   Transfers:  Sit to Stand: Contact Guard Assistance  Stand to [employer] Assistance  Ambulation:  Stand By Assistance  Distance: 15' x 1, 200' x 2  Surface: Level Tile  Device:Rolling Walker  Gait Deviations: Forward Flexed Posture, Slow [place], Decreased Step Length Bilaterally, Decreased Gait Speed, Narrow Base of Support and Decreased Terminal Knee Extension  Exercise:  Patient was guided in 1 set(s) 10 reps of exercises: Glut sets, Seated marches, Seated hamstring curls, Seated heel/toe raises, Long arc quads, Seated isometric hip adduction, Seated abduction/adduction, and abdominal isometrics.   Exercises were completed for increased independence with functional mobility. OT:   BED MOBILITY:  Sit to Supine: Stand By Assistance HOB flat and used bedrail and log roll technique  TRANSFERS:  Sit to Stand:  Minimal Assistance. bedside chair and chair without arms  Stand to Sit: Stand By Assistance. FUNCTIONAL MOBILITY:  Assistive Device: Rolling Walker  Assist Level:  Stand By Assistance. Distance: To and from therapy apartment   ADDITIONAL ACTIVITIES:  Patient completed IADL homemaking task this date of preparing cup of tea - task was graded to challenge walker safety. Patient was able to retrieve all items from counter and first shelf with Supervision and 0 VCs for safety during the retrieval and transportation of items. Patient required supervision throughout task with a standing endurance of greater than 5 minutes. Review of Systems:  CONSTITUTIONAL:  positive for  fatigue  EYES:  negative  HEENT:  negative  RESPIRATORY:  negative  CARDIOVASCULAR:  negative  GASTROINTESTINAL:  Negative  GENITOURINARY:  negative  SKIN:  Lumbar incision  HEMATOLOGIC/LYMPHATIC:  negative  MUSCULOSKELETAL:  positive for pain and muscle weakness, Rheumatoid arthritis  NEUROLOGICAL:  positive for headaches, gait problems, weakness and pain  BEHAVIOR/PSYCH:  negative  System review otherwise negative      Objective:  Vitals:    02/20/22 2200   BP: (!) 140/65   Pulse: 69   Resp: 16   Temp: 98.1 °F (36.7 °C)   SpO2: 96%     Awake  Orientation:   person, place, time  Mood: within normal limits  Affect: calm  General appearance: Patient is well nourished, well developed, well groomed and in no acute distress, appearing stated age     Memory:   normal,   Attention/Concentration: normal  Language:  normal     Cranial Nerves:  cranial nerves II-XII are grossly intact  ROM:  abnormal - limited lumbar  Tone:  normal  Muscle bulk: within normal limits  Sensory:  Sensory intact    Skin: warm and dry, no rash or erythema. Lumbar incision not visualized.     Peripheral vascular: Pulses: Normal upper and lower extremity pulses; Edema: trace      Diagnostics:   No results found for this or any previous visit (from the past 24 hour(s)). Impression:  · Lumbar stenosis  ? L1-L4 laminectomies and a left side L2-L3 lumbar disk excision, CSF leak. On 22 with Dr Lora Negrete  · Lumbar pain with radiculopathy - improved post op  · Left leg weakness  · Atrial septal defect   · Sick sinus syndrome with pacemaker  · PAF  · CAD with hx stent  · HFpEF  · Rheumatoid arthritis  · COPD  · HTN  · HLD  · GERD      Plan:  Continue current therapies  Prophylaxis: DVT - SCDs, JOEL hose, early ambulation, GI - Protonix  Pain: Norco 5/325mg every 4 hours PRN, tylenol, tramadol 50mg every 6 hours PRN  Bowels: dulcolax, MOM, Movantik, Miralax, senokot. +BM 22  Bladder: per Rehab nursing  RA: methotrexate weekly  Sleep: melatonin 3mg nightly PRN  Zyrtec for itching - avoiding benadryl due to drowsiness, plan to continue zyrtec through this admission, patient doesn't think she will need at home. · Lumbar drain removed 22  · KUB, Left hip Xr, CBC, BMP today.    · Team conference Wednesday   · Discharge plannin22, Mani Stevens- PT, OT, RN, HHA       Missed Therapy Time:  · None    Alia Spring, APRN - CNP

## 2022-02-22 PROBLEM — Z98.890 S/P LUMBAR SPINE OPERATION: Status: ACTIVE | Noted: 2022-02-22

## 2022-02-22 PROCEDURE — 97530 THERAPEUTIC ACTIVITIES: CPT

## 2022-02-22 PROCEDURE — 97535 SELF CARE MNGMENT TRAINING: CPT

## 2022-02-22 PROCEDURE — 97116 GAIT TRAINING THERAPY: CPT

## 2022-02-22 PROCEDURE — 6370000000 HC RX 637 (ALT 250 FOR IP): Performed by: NURSE PRACTITIONER

## 2022-02-22 PROCEDURE — 1180000000 HC REHAB R&B

## 2022-02-22 PROCEDURE — 99231 SBSQ HOSP IP/OBS SF/LOW 25: CPT | Performed by: NURSE PRACTITIONER

## 2022-02-22 PROCEDURE — 6370000000 HC RX 637 (ALT 250 FOR IP): Performed by: PHYSICAL MEDICINE & REHABILITATION

## 2022-02-22 PROCEDURE — 97110 THERAPEUTIC EXERCISES: CPT

## 2022-02-22 PROCEDURE — 94760 N-INVAS EAR/PLS OXIMETRY 1: CPT

## 2022-02-22 RX ORDER — SENNA AND DOCUSATE SODIUM 50; 8.6 MG/1; MG/1
1 TABLET, FILM COATED ORAL 2 TIMES DAILY
Qty: 60 TABLET | Refills: 0 | Status: SHIPPED | OUTPATIENT
Start: 2022-02-22

## 2022-02-22 RX ORDER — TRAMADOL HYDROCHLORIDE 50 MG/1
50 TABLET ORAL EVERY 6 HOURS PRN
Qty: 28 TABLET | Refills: 0 | Status: SHIPPED | OUTPATIENT
Start: 2022-02-22 | End: 2022-03-01

## 2022-02-22 RX ORDER — LANOLIN ALCOHOL/MO/W.PET/CERES
3 CREAM (GRAM) TOPICAL NIGHTLY PRN
Qty: 30 TABLET | Refills: 3 | Status: SHIPPED | OUTPATIENT
Start: 2022-02-22

## 2022-02-22 RX ORDER — DIAZEPAM 5 MG/1
5 TABLET ORAL EVERY 8 HOURS PRN
Qty: 30 TABLET | Refills: 0 | Status: SHIPPED | OUTPATIENT
Start: 2022-02-22 | End: 2022-03-04

## 2022-02-22 RX ORDER — LANSOPRAZOLE 30 MG/1
30 CAPSULE, DELAYED RELEASE ORAL EVERY MORNING
Qty: 30 CAPSULE | Refills: 0 | Status: SHIPPED | OUTPATIENT
Start: 2022-02-22

## 2022-02-22 RX ADMIN — Medication 250 MG: at 12:24

## 2022-02-22 RX ADMIN — Medication 2 EACH: at 12:24

## 2022-02-22 RX ADMIN — Medication 400 MCG: at 20:55

## 2022-02-22 RX ADMIN — BISACODYL 5 MG: 5 TABLET ORAL at 09:02

## 2022-02-22 RX ADMIN — Medication 1 EACH: at 12:25

## 2022-02-22 RX ADMIN — PANTOPRAZOLE SODIUM 40 MG: 40 TABLET, DELAYED RELEASE ORAL at 05:55

## 2022-02-22 RX ADMIN — METOPROLOL TARTRATE 37.5 MG: 25 TABLET, FILM COATED ORAL at 09:04

## 2022-02-22 RX ADMIN — Medication 1 EACH: at 20:55

## 2022-02-22 RX ADMIN — POLYETHYLENE GLYCOL 3350 17 G: 17 POWDER, FOR SOLUTION ORAL at 08:58

## 2022-02-22 RX ADMIN — ATORVASTATIN CALCIUM 10 MG: 10 TABLET, FILM COATED ORAL at 20:27

## 2022-02-22 RX ADMIN — Medication 3 MG: at 20:27

## 2022-02-22 RX ADMIN — DIGOXIN 125 MCG: 125 TABLET ORAL at 09:01

## 2022-02-22 RX ADMIN — SENNOSIDES AND DOCUSATE SODIUM 1 TABLET: 50; 8.6 TABLET ORAL at 20:27

## 2022-02-22 RX ADMIN — CETIRIZINE HYDROCHLORIDE 10 MG: 10 TABLET, FILM COATED ORAL at 09:02

## 2022-02-22 RX ADMIN — METOPROLOL TARTRATE 37.5 MG: 25 TABLET, FILM COATED ORAL at 20:27

## 2022-02-22 RX ADMIN — NALOXEGOL OXALATE 12.5 MG: 12.5 TABLET, FILM COATED ORAL at 09:05

## 2022-02-22 RX ADMIN — CLOPIDOGREL BISULFATE 75 MG: 75 TABLET, FILM COATED ORAL at 09:01

## 2022-02-22 RX ADMIN — SENNOSIDES AND DOCUSATE SODIUM 1 TABLET: 50; 8.6 TABLET ORAL at 08:58

## 2022-02-22 ASSESSMENT — PAIN SCALES - GENERAL
PAINLEVEL_OUTOF10: 0

## 2022-02-22 NOTE — PLAN OF CARE
Problem: Falls - Risk of:  Goal: Will remain free from falls  Description: Will remain free from falls  Outcome: Ongoing  No falls sustained at this time. Patient alert to call light and uses appropriately to alert staff to needs. Bed/chair alarms in use. Problem: Pain:  Goal: Pain level will decrease  Description: Pain level will decrease  Outcome: Ongoing  Patient denies pain this shift. Problem: Activity:  Goal: Sleeping patterns will improve  Description: Sleeping patterns will improve  Outcome: Ongoing  Patient given prn Melatonin at hs and is resting quietly with eyes closed at this time. Care plan reviewed with patient. Patient verbalize understanding of the plan of care and contribute to goal setting.

## 2022-02-22 NOTE — PROGRESS NOTES
Physical Medicine & Rehabilitation   Progress Note    Chief Complaint:  Lumbar stenosis. S/p lumbar espinoza with CSF leak. Rehab needs    Subjective:  Patient seen today, just getting back to bed with staff. Patient states she is feeling much better today, just some tenderness. Had two BM yesterday. Patient looking forward to discharge tomorrow. Patient denies any other needs or concerns at this time. Planned discharge from the inpatient rehabilitation unit to home which is scheduled for Wednesday, 2/23/22, with [address] to include Physical Therapy, Occupational Therapy, Nursing and HHA 2x week. Rehabilitation:  PT:   Bed Mobility:  Sit to Supine: Stand By Assistance, with head of bed raised, with increased time for completion   Transfers:  Sit to Stand: Supervision  Stand to Sit:Supervision  Ambulation:  Supervision  Distance: 100ft, 120ft  Surface: Level Tile  Device:Rolling Walker  Gait Deviations:  Slow Tianna, Decreased Step Length Bilaterally and Decreased Gait Speed  Stairs:  Supervision  Number of Steps: 1 + 1 platform steps to simulate home  Height: 6\" step with Rolling Walker  Exercise:  Patient was guided in 1 set(s) 12 reps of exercise to both lower extremities. Seated marches, Seated hamstring curls, Seated heel/toe raises, Long arc quads and Seated isometric hip adduction. Exercises were completed for increased independence with functional mobility. OT:   ADL:   No ADL's completed this session. [place] BALANCE:  Sitting Balance:  Supervision. Standing Balance: Stand By Assistance. TRANSFERS:  Sit to Stand:  Stand By Assistance. Stand to Sit: Stand By Assistance. FUNCTIONAL MOBILITY:  Assistive Device: Rolling Walker  Assist Level:  Stand By Assistance. Distance: within gym  Slow pace, steady throughout with no LOB. Mod c/o pain.     ADDITIONAL ACTIVITIES:  Guided patient through BUE AROM with mod resistance band this date x12 reps x2 set in w/c in order to increase BUE strength and improve activity tolerance for ADLs and homemaking tasks. Pt required min VC for technique throughout. No rest breaks required. Review of Systems:  CONSTITUTIONAL:  positive for  fatigue  EYES:  negative  HEENT:  negative  RESPIRATORY:  negative  CARDIOVASCULAR:  negative  GASTROINTESTINAL:  Negative  GENITOURINARY:  negative  SKIN:  Lumbar incision  HEMATOLOGIC/LYMPHATIC:  negative  MUSCULOSKELETAL:  positive for pain and muscle weakness, Rheumatoid arthritis  NEUROLOGICAL:  positive for headaches, gait problems, weakness and pain  BEHAVIOR/PSYCH:  negative  System review otherwise negative      Objective:  Vitals:    02/21/22 2209   BP: (!) 101/56   Pulse: 69   Resp: 12   Temp: 97.6 °F (36.4 °C)   SpO2: 98%     Awake  Orientation:   person, place, time  Mood: within normal limits  Affect: calm  General appearance: Patient is well nourished, well developed, well groomed and in no acute distress, appearing stated age     Memory:   normal,   Attention/Concentration: normal  Language:  normal     Cranial Nerves:  cranial nerves II-XII are grossly intact  ROM:  abnormal - limited lumbar  Tone:  normal  Muscle bulk: within normal limits  Sensory:  Sensory intact    Skin: warm and dry, no rash or erythema. Lumbar incision not visualized.     Peripheral vascular: Pulses: Normal upper and lower extremity pulses; Edema: trace      Diagnostics:   Recent Results (from the past 24 hour(s))   CBC with Auto Differential    Collection Time: 02/21/22  9:23 AM   Result Value Ref Range    WBC 7.8 4.8 - 10.8 thou/mm3    RBC 3.50 (L) 4.20 - 5.40 mill/mm3    Hemoglobin 10.4 (L) 12.0 - 16.0 gm/dl    Hematocrit 34.6 (L) 37.0 - 47.0 %    MCV 98.9 81.0 - 99.0 fL    MCH 29.7 26.0 - 33.0 pg    MCHC 30.1 (L) 32.2 - 35.5 gm/dl    RDW-CV 13.8 11.5 - 14.5 %    RDW-SD 49.2 (H) 35.0 - 45.0 fL    Platelets 490 556 - 483 thou/mm3    MPV 10.4 9.4 - 12.4 fL    Seg Neutrophils 54.0 %    Lymphocytes 33.4 %    Monocytes 9.3 % Eosinophils 2.3 %    Basophils 0.6 %    Immature Granulocytes 0.4 %    Segs Absolute 4.2 1.8 - 7.7 thou/mm3    Lymphocytes Absolute 2.6 1.0 - 4.8 thou/mm3    Monocytes Absolute 0.7 0.4 - 1.3 thou/mm3    Eosinophils Absolute 0.2 0.0 - 0.4 thou/mm3    Basophils Absolute 0.0 0.0 - 0.1 thou/mm3    Immature Grans (Abs) 0.03 0.00 - 0.07 thou/mm3    nRBC 0 /100 wbc   Basic Metabolic Panel    Collection Time: 22  9:23 AM   Result Value Ref Range    Sodium 140 135 - 145 meq/L    Potassium 3.9 3.5 - 5.2 meq/L    Chloride 101 98 - 111 meq/L    CO2 28 23 - 33 meq/L    Glucose 90 70 - 108 mg/dL    BUN 18 7 - 22 mg/dL    CREATININE 0.4 0.4 - 1.2 mg/dL    Calcium 9.3 8.5 - 10.5 mg/dL   Anion Gap    Collection Time: 22  9:23 AM   Result Value Ref Range    Anion Gap 11.0 8.0 - 16.0 meq/L   Glomerular Filtration Rate, Estimated    Collection Time: 22  9:23 AM   Result Value Ref Range    Est, Glom Filt Rate >90 ml/min/1.73m2       Impression:  · Lumbar stenosis  ? L1-L4 laminectomies and a left side L2-L3 lumbar disk excision, CSF leak. On 22 with Dr Linwood Santos  · Lumbar pain with radiculopathy - improved post op  · Left leg weakness  · Atrial septal defect   · Sick sinus syndrome with pacemaker  · PAF  · CAD with hx stent  · HFpEF  · Rheumatoid arthritis  · COPD  · HTN  · HLD  · GERD      Plan:  Continue current therapies  Prophylaxis: DVT - SCDs, JOEL hose, early ambulation, GI - Protonix  Pain: Norco 5/325mg every 4 hours PRN, tylenol, tramadol 50mg every 6 hours PRN  Bowels: dulcolax, MOM, Movantik, Miralax, senokot. +BM 22  Bladder: per Rehab nursing  RA: methotrexate weekly  Sleep: melatonin 3mg nightly PRN  Zyrtec for itching - avoiding benadryl due to drowsiness, plan to continue zyrtec through this admission, patient doesn't think she will need at home.    · Team conference Wednesday   · Discharge plannin22, [facility]- PT, OT, RN, [facility]       Missed Therapy Time:  · None    Alia Spring, APRN - CNP

## 2022-02-22 NOTE — PROGRESS NOTES
[facility]  INPATIENT PHYSICAL THERAPY  Progress Note  [facility] - 6O-11/830-K    Time In: 1010  Time Out: 1130  Timed Code Treatment Minutes: 80 Minutes  Minutes: 80          Date: 2022  Patient Name: Cristina Engle,  Gender:  female        MRN: [id number]  : 1945  (77 y.o.)  Referral Date : 22  Referring Practitioner: LUANN Mayer (referring), Kirsten Warren MD  Diagnosis: Spinal stenosis of lumbar region with radiculopathy  Additional Pertinent Hx: Per H&P: \"Gail Swan  is a 77 y.o. right-handed  female with a history of hypertension, GERD, hyperlipidemia, thyroid nodule, atrial fibrillation requiring WATCHMAN procedure, rheumatoid arthritis with bilateral hand fingers deformity, COPD and emphysema, diverticulosis, coronary artery disease requiring coronary arteries angioplasty and stenting, congestive heart failure, anemia, status post pacemaker placement, status post bilateral total knee arthroplasties and bilateral total hip arthroplasty, status post multiple bilateral feet surgeries for deformity, status post lumbar spine surgeries in  and , status post appendectomy, hysterectomy, hemorrhoidectomy, is admitted to the inpatient rehabilitation unit on 2022 for intensive inpatient rehabilitation treatment of impaired ADLs and ambulation secondary to lumbar spinal stenosis with left lower extremity radiculopathy requiring lumbar laminectomy and disc excision on 2022 by Dr. Marissa Matthews. \"  Pt transferred to inpatient rehab 22.      Prior Level of Function:  Lives With: Alone  Type of Home: House  Home Layout: One level  Home Access: Stairs to enter without rails  Entrance Stairs - Number of Steps: 2 steps to garage with left hand rail, 1 + 1 +1 step into home  Home Equipment: Rolling walker,Standard walker,Wheelchair-manual,Cane    Vitals: Vitals not assessed per clinical judgement, see nursing flowsheet    Restrictions/Precautions:  Restrictions/Precautions: General Precautions,Fall Risk,Surgical Protocols  Required Braces or Orthoses  Spinal: Lumbar Corset  Position Activity Restriction  Spinal Precautions: No Bending,No Lifting,No Twisting  Other position/activity restrictions: L1-L4 Laminectomy with complication (durotomy) on 2/4    SUBJECTIVE: Patient in room in bed, awake, agreeable to PT. Pt cooperative and pleasant. PAIN: no complaints of pain    OBJECTIVE:  Bed Mobility:  Rolling to Left: Modified Independent   Rolling to Right: Modified Independent   Supine to Sit: Modified Independent  Sit to Supine: Modified Independent    *Head of bed flat, no rail    Transfers:  Sit to Stand: Modified Independent  Stand to Sit:Modified Independent  To/From Bed and Chair: Modified Independent  Car:Modified Independent    Ambulation:  Modified Independent  Distance: 190', 8', 20', 30', 50', 100'  Surface: Level Tile  Device:Rolling Walker  Gait Deviations: Forward Flexed Posture, Slow Tianna, Decreased Weight Shift Left, Decreased Gait Speed and Decreased Heel Strike Bilaterally  *Education on focusing on equal stance between LE's as pt presents with antalgic gait with decreased stance time on the left. Pt notes left LE weaker and she has had hx of hip surgery on that side. Modified Independent  Distance: 16'  Surface: Uneven Surface  Device:Rolling Walker  Gait Deviations: Forward Flexed Posture, Slow Tianna, Decreased Weight Shift Left, Decreased Gait Speed and Decreased Heel Strike Bilaterally    Modified Independent  Distance: 10'  Surface: Ramp  Device:Rolling Walker  Gait Deviations:   Forward Flexed Posture, Slow Tianna, Decreased Step Length Bilaterally, Decreased Gait Speed and Decreased Heel Strike Bilaterally    Stairs:  Modified Independent  Number of Steps: 1 x3  Height: 6\" step with Rolling Walker   *No cues for sequencing or RW placement    Stairs:  Stand By Assistance Ger Lexie Assistance  Number of Steps: 4  Height: 6\" step with Bilateral Handrails   *Mild instability especially with fatigue    Stairs:  Stand By Assistance, Contact Guard Assistance  Number of Steps: 12  Height: 6\" step with Bilateral Handrails   *Mild instability especially with fatigue    Stairs:  Contact Guard Assistance  Number of Steps: 4  Height: 6\" step with One Handrail    Functional Outcome Measures: Completed  Balance Score: 13  Gait Score: 8  Tinetti Total Score: 21/28  Risk Indicators:  Less than/equal to 18 = high risk  19-23 Moderate risk  Greater than/equal to 24 = low risk    *Patient able to  object in standing with a reacher with modified independence    ASSESSMENT:  Assessment:   Ms Tor Parker met 5/5 STG's and 4/7 LTG's. Patient has made good gains in PT over the last week, progressing supine < > sit from SBA/CGA to modified independence, sit < > stand from SBA to modified independence, gait from SBA to modified independence with a RW and a platform step with a RW and modified independence. Patient requires SBA/CGA to ascend/descend steps with 1 to 2 hand rails due to LE instability. Patient continues to demonstrate balance impairment as evidenced by tinetti score of 22/28 and trunk and LE weakness, and would benefit from continued skilled PT at discharge. Pt planning to discharge 2/23/22 to home. Pt would benefit from continued skilled PT services to maximize independence to facilitate safe discharge to home. Activity Tolerance:  Patient tolerance of  treatment: good.       Equipment Recommendations:Equipment Needed: No  Discharge Recommendations:  Discharge Recommendations: Continue to assess pending progress,Patient would benefit from continued therapy after discharge    Plan: Times per week: 5x/wk 90min, 1x/wk 30min  Times per day: Daily  Plan weeks: 2  Current Treatment Recommendations: Strengthening,Transfer Training,Endurance Training,Neuromuscular Re-education,Patient/Caregiver Education & Training,Balance Training,Gait Training,Home Exercise Program,Functional Mobility Training,Stair training,Safety Education & Training    Patient Education  Patient Education: Transfers, Gait, Stairs, Home Safety Education,  - Patient Verbalized Understanding, - Patient Requires Continued Education    Goals:  Patient goals : \"be able to be self sufficient\"  Short term goals  Time Frame for Short term goals: 1 week  Short term goal 1: Patient will complete supine < > sit with SBA to transfer in/out of bed with decreased difficulty. GOAL MET  Short term goal 2: Patient will complete sit < > stand with SBA with a RW to stand to ambulate with decreased difficulty. MET, SEE LTG  Short term goal 3: Patient will ambulate Deandre  1560' with a RW and SBA to progress towards navigating home safely. MET, SEE LTG  Short term goal 4: Patient will ascend/descend 2, 6\" step with a RW with CGA to progress towards safe home entry. MET, SEE LTG  Short term goal 5: Patient will improve tinetti to greater than or equal to 19/28 to reduce her risk for falls. MET, SEE LTG  Long term goals  Time Frame for Long term goals : 2 weeks  Long term goal 1: Patient will complete supine  < > sit with modified independence to transfer in/out of bed safely. GOAL MET  Long term goal 2: Patient will sit < > stand with modified independence to stand to ambulate safely. GOAL MET  Long term goal 3: Patient will ambulate 80' with a RW and modified independence to navigate home safely. GOAL MET  Long term goal 4: Patient will ascend/descend 3, 6\" steps with a RW and modified independence for safe home access. GOAL NOT MET  Long term goal 5: Patient will ascend/descend 2, 6\" steps with a hand rail and modified independence for garage entry. GOAL NOT MET  Long term goal 6: Patient will complete car transfer with modified independence to transfer in/out of vehicle safely.  GOAL MET  Long term goal 7: Patient will improve tinetti to greater than or equal to 24/28 to reduce her risk for falls GOAL NOT MET    Revised Short-Term Goals:    Short term goals  Time Frame for Short term goals: 1 week  Short term goal 1: Patient will complete supine < > sit with SBA to transfer in/out of bed with decreased difficulty. GOAL MET  Short term goal 2: Patient will complete sit < > stand with SBA with a RW to stand to ambulate with decreased difficulty. MET, SEE LTG  Short term goal 3: Patient will ambulate Deandre [address]i 1560' with a RW and SBA to progress towards navigating home safely. MET, SEE LTG  Short term goal 4: Patient will ascend/descend 2, 6\" step with a RW with CGA to progress towards safe home entry. MET, SEE LTG  Short term goal 5: Patient will improve tinetti to greater than or equal to / to reduce her risk for falls. MET, SEE LTG    Revised Long-Term Goals  Long term goals  Time Frame for Long term goals : 2 weeks  Long term goal 1: Patient will complete supine  < > sit with modified independence to transfer in/out of bed safely. GOAL MET  Long term goal 2: Patient will sit < > stand with modified independence to stand to ambulate safely. GOAL MET  Long term goal 3: Patient will ambulate 80' with a RW and modified independence to navigate home safely. GOAL MET  Long term goal 4: Patient will ascend/descend 3, 6\" steps with a RW and modified independence for safe home access. Long term goal 5: Patient will ascend/descend 2, 6\" steps with a hand rail and modified independence for garage entry. Long term goal 6: Patient will complete car transfer with modified independence to transfer in/out of vehicle safely.  GOAL MET  Long term goal 7: Patient will improve tinetti to greater than or equal to 24/28 to reduce her risk for falls

## 2022-02-22 NOTE — PROGRESS NOTES
[address]  Occupational Therapy  Progress Note    Time In: 0700  Time Out: 0830  Timed Code Treatment Minutes: 90 Minutes  Minutes: 90          Date: 2022  Patient Name: Crow Strange,   Gender: female      Room: [facility]/059-A  MRN: [id number]  : 1945  (77 y.o.)  Referring Practitioner: LUANN Lai  Diagnosis: spinal stenosis of lumbar region with radiculopathy  Additional Pertinent Hx: Per EMR \"Gail is a 75y/o female known to our office having been seen in the past several months with complaints of low back pain and leg pain. She has been through multiple epidural injections, most recently last month, with some mild-moderate relief of her pain. Over the past several days her pain had been worsening significantly to the point it was completely uncontrolled and her mobility had declined significantly. She called our office and the only recommendation was for her to present to the ED for evaluation and admission for further care. She does have a history of previous lumbar spine surgery roughly 20yrs ago. History of pacemaker, afib and is on plavix as well. She describes a sharp pain traveling from the low back into the left lateral/anterior thigh are. No new changes in bowel/bladder continence. \" L1-L4 Laminectomy with complication (durotomy) on     Restrictions/Precautions:  Restrictions/Precautions: General Precautions,Fall Risk,Surgical Protocols  Required Braces or Orthoses  Spinal: Lumbar Corset  Position Activity Restriction  Spinal Precautions: No Bending,No Lifting,No Twisting  Other position/activity restrictions: L1-L4 Laminectomy with complication (durotomy) on     SUBJECTIVE: Pt. Pleasant throughout session. Pt. Is eager to return home tomorrow. Pt. Reports she will be going home with [facility] services. PAIN: Pt. Denies any pain throughout session. Pt. Reports her groin pain has subsided from previous date.      Vitals: Vitals not assessed per clinical judgement, see nursing flowsheet    COGNITION: WFL  Pt. Very cautious and attentive to spinal precautions. EATING:Independent. [place]borne Hoguet CARE Score: 6. ORAL HYGIENE:Setup or clean-up assistance. Clayborne Hoguet CARE Score: 5.     TOILETING HYGIENE:Independent. Clayborne Hoguet CARE Score: 6. SHOWERING/BATHING:Supervision or touching assistance. Clayborne Hoguet CARE Score: 4.     UPPER BODY DRESSING:Setup or clean-up assistance. Clayborne Hoguet CARE Score: 5. LOWER BODY DRESSING:Setup or clean-up assistance. Clayborne Hoguet CARE Score: 5. FOOTWEAR:Supervision or touching assistance   . CARE Score: 4. TOILET TRANSFER: Independent. Clayborne Hoguet CARE Score: 6. ADL:   Feeding: Modified Independent. for beverage management  Grooming: Set up/clean up assistance with no cues for spinal precautions   Bathing: Supervision. Upper Extremity Dressing: Set up/clean up assistance  Lower Extremity Dressing: Set up/clean up assistance with use of LHAE  Toileting: Modified Independent. Toilet Transfer: Modified Independent 0 cues required from raised surface  Tub Transfer: Supervision. with good technique with good use of grab bars to step in/out of tub. Supervision for sit <-> stand from shower chair. .  Pt. Able to don/doff lumbar coresst x3 trials during session with mod I.  Pt. Able to manipulate straps to secure the fit. Increased time to perform but pt has demonstrated improved performance. OTR provided hands on demonstration with flexible sock aide, pt able to complete with good return demo with SBA this date. BALANCE:  Sitting Balance:  Independent. Standing Balance: Modified Independent. BED MOBILITY:  Not Tested    TRANSFERS:  Sit to Stand:  Modified Independent. Stand to Sit: Modified Independent. No verbal or tactile cues required during sit <-> stand transfers.   Pt. Demo good ability to control self during ascending/descending from transfer surface with 100% compliance with spinal precautions. FUNCTIONAL MOBILITY:  Assistive Device: Rolling Walker  Assist Level:  Modified Independent. Distance: To and from bathroom and To and from shower room   Pt. Demo good safety throughout with 0 cues required. ASSESSMENT:  Activity Tolerance:  Patient tolerance of  treatment: good. Assessment: Assessment: Pt. has progressed well with OT services to date. Pt. has progressed towards 6 minutes of standing during functional tasks. Pt. demo improved safety/performance with toilet transfer/toileting task to Mod I. Pt. has progressed with full body dressing with set up/clean up assistance, pt able to don/doff brace with good safety. Pt. very attentive to spinal preucations and demo 100% accuracy with spinal precuations throughout sessions. Pt. plan is to return home with [facility] services to promote safe transitions to home. Pt. to benefit from further training during IADLs to further advance towards PLOF. Discharge Recommendations: Continue to assess pending progress,Home with Home health OT  Equipment Recommendations: Equipment Needed: Yes  Mobility Devices: ADL Assistive Devices  Other: Pt. Reports she will be utilizing her daughter in laws shower chair with a back for showering purposes. She will be ordering a sock aid, dressing stick and walker tray table. Pt. Reports she owns a reacher, raised toilet seat and long handled shoe horn.    Plan: Times per week: 5 x / wk for 90 minutes, 1 x / wk for 30 minutes  Times per day: Daily  Current Treatment Recommendations: Delores Richardson Re-education,Patient/Caregiver Education & Training,Self-Care / ADL,Equipment Evaluation, Education, & procurement,Safety Education & Training    Patient Education  Patient Education: Plan of Care and ADL's, discharge planning, review of d/c recommendations    Goals  Short term goals  Time Frame for Short term goals: by discharge  Short term goal 1: patient will tolerate 7 min functional standing with two hand release with (S) to increase activity tolerance for grooming and toileting. GOAL NOT MET   Short term goal 2: Pt. to complete sinkside grooming with set up/clean up assistance with good carryover reaching items without twisting. GOAL MET  Short term goal 3: Discontinued  Short term goal 4: Discontinued  Short term goal 5: patient will complete toileting routine with (S) and 0-1 verbal cues for spinal precautions. GOAL MET    Long term goals   Time Frame for Long term goals : 2 weeks   Long term goal 1: patient will complete tub transfer with (S). GOAL MET  Long term goal 2: patient will complete light meal prep task with MOD I within spinal precautions. GOAL NOT MET, CONTINUE  Long term goal 3: patient will complete grocery shopping task with MOD I using strategies to reach items within spinal precautions. GOAL NOT MET, CONTINUE    Long term goal 4: patient will complete ADL routine with MOD I with use of AE PRN. GOAL PARTIALLY MET  Long term goal 5: patient will retrieve items from various heights with least restrictive device and AE PRN with MOD I to safely transition to prior living environment to complete IADLs. GOAL NOT MET, CONTINUE    Revised Short-Term Goals  Short term goals  Time Frame for Short term goals: by discharge    Short term goal 1: patient will tolerate 7 min functional standing with two hand release with (S) to increase activity tolerance for grooming and toileting. Short term goal 2: GOAL MET  Short term goal 3: Discontinued  Short term goal 4: Discontinued  Short term goal 5: GOAL MET    Long term goals  Time Frame for Long term goals : 2 weeks    Long term goal 1: GOAL MET  Long term goal 2: patient will complete light meal prep task with MOD I within spinal precautions. Long term goal 3: patient will complete grocery shopping task with MOD I using strategies to reach items within spinal precautions.   Long term goal 4: patient will complete ADL routine with MOD I with use of AE PRN. Long term goal 5: patient will retrieve items from various heights with least restrictive device and AE PRN with MOD I to safely transition to prior living environment to complete IADLs. Following session, patient left in safe position with all fall risk precautions in place.

## 2022-02-22 NOTE — PROGRESS NOTES
[facility]  INPATIENT PHYSICAL THERAPY  DAILY NOTE  3 [facility]    Time In:   Time Out: 1429  Timed Code Treatment Minutes: 12 Minutes  Minutes: 12          Date: 2022  Patient Name: Danyelle Thapa,  Gender:  female        MRN: [id number]  : 1945  (77 y.o.)  Referral Date : 22  Referring Practitioner: LUANN Marie (referring), Ezekiel Avelar MD  Diagnosis: Spinal stenosis of lumbar region with radiculopathy  Additional Pertinent Hx: Per H&P: \"Gail Abad  is a 77 y.o. right-handed  female with a history of hypertension, GERD, hyperlipidemia, thyroid nodule, atrial fibrillation requiring WATCHMAN procedure, rheumatoid arthritis with bilateral hand fingers deformity, COPD and emphysema, diverticulosis, coronary artery disease requiring coronary arteries angioplasty and stenting, congestive heart failure, anemia, status post pacemaker placement, status post bilateral total knee arthroplasties and bilateral total hip arthroplasty, status post multiple bilateral feet surgeries for deformity, status post lumbar spine surgeries in  and , status post appendectomy, hysterectomy, hemorrhoidectomy, is admitted to the inpatient rehabilitation unit on 2022 for intensive inpatient rehabilitation treatment of impaired ADLs and ambulation secondary to lumbar spinal stenosis with left lower extremity radiculopathy requiring lumbar laminectomy and disc excision on 2022 by Dr. Leann Dietrich. \"  Pt transferred to inpatient rehab 22.      Prior Level of Function:  Lives With: Alone  Type of Home: House  Home Layout: One level  Home Access: Stairs to enter without rails  Entrance Stairs - Number of Steps: 2 steps to garage with left hand rail, 1 + 1 +1 step into home  Home Equipment: Rolling walker,Standard walker,Wheelchair-manual,Cane   Bathroom Shower/Tub: Tub/Shower unit  Bathroom Toilet: Handicap height  Bathroom Equipment: Grab bars in shower (reports she has access to a shower chair with a back)  Bathroom Accessibility: Accessible (patient states she will have difficulty getting a 2 w/w into the BR at home. will have to side step or furniture walk)    Receives Help From: Friend(s)  ADL Assistance: [address]: Independent  Homemaking Responsibilities: Yes  Ambulation Assistance: Independent  Transfer Assistance: Independent  Active : Yes  Additional Comments: Pt IND prior, family in the area to help as needed. Pt was using cane about 1-2 weeks prior to admission due to pain    Restrictions/Precautions:  Restrictions/Precautions: General Precautions,Fall Risk,Surgical Protocols  Required Braces or Orthoses  Spinal: Lumbar Corset  Position Activity Restriction  Spinal Precautions: No Bending,No Lifting,No Twisting  Other position/activity restrictions: L1-L4 Laminectomy with complication (durotomy) on 2/4     SUBJECTIVE: Patient in room in chair, agreeable to PT. Pt cooperative and pleasant. PAIN: low back, not rated    Vitals: Vitals not assessed per clinical judgement, see nursing flowsheet    OBJECTIVE:  Bed Mobility:  Sit to Supine: Modified Independent     Transfers:  Sit to Stand: Modified Independent  Stand to Sit:Modified Independent    Ambulation:  Modified Independent  Distance: 6'  Surface: Level Tile  Device:Rolling Walker  Gait Deviations: Forward Flexed Posture, Slow Tianna, Decreased Gait Speed and Decreased Heel Strike Bilaterally    Balance:  Static Standing Balance: Modified Independent  Dynamic Standing Balance: Modified Independent    Exercise:  Patient was guided in 1 set(s) 10 reps of exercise to both lower extremities. Seated: long arc quad, ankle pumps x20. Supine: heel slide, hip abduction, glut sets 5\" hold, hip flexion. Verbal cues to tighten abdominal muscles with supine exercises to maximize benefit. Education on importance of walking with return to home. Exercises were completed for increased independence with functional mobility. Provided pt with written HEP. Functional Outcome Measures: Not completed    ASSESSMENT:  Assessment: Patient progressing toward established goals. Activity Tolerance:  Patient tolerance of  treatment: good. Equipment Recommendations:Equipment Needed: No  Discharge Recommendations: Continue to assess pending progress and Patient would benefit from continued PT at discharge  Plan: Times per week: 5x/wk 90min, 1x/wk 30min  Times per day: Daily  Plan weeks: 2  Current Treatment Recommendations: Shmuel Grippe Re-education,Patient/Caregiver Education & Training,Balance Training,Gait Training,Home Exercise Program,Functional Mobility Training,Stair training,Safety Education & Training    Patient Education  Patient Education: Home Exercise Program, Verbal Exercise Instruction,  - Patient Verbalized Understanding, - Patient Requires Continued Education    Goals:  Patient goals : \"be able to be self sufficient\"  Short term goals  Time Frame for Short term goals: 1 week  Short term goal 1: Patient will complete supine < > sit with SBA to transfer in/out of bed with decreased difficulty. GOAL MET  Short term goal 2: Patient will complete sit < > stand with SBA with a RW to stand to ambulate with decreased difficulty. MET, SEE LTG  Short term goal 3: Patient will ambulate 80' with a RW and SBA to progress towards navigating home safely. MET, SEE LTG  Short term goal 4: Patient will ascend/descend 2, 6\" step with a RW with CGA to progress towards safe home entry. MET, SEE LTG  Short term goal 5: Patient will improve tinetti to greater than or equal to 19/28 to reduce her risk for falls. MET, SEE LTG  Long term goals  Time Frame for Long term goals : 2 weeks  Long term goal 1: Patient will complete supine  < > sit with modified independence to transfer in/out of bed safely.  GOAL MET  Long term goal 2: Patient will sit < > stand with modified independence to stand to ambulate safely. GOAL MET  Long term goal 3: Patient will ambulate 80' with a RW and modified independence to navigate home safely. GOAL MET  Long term goal 4: Patient will ascend/descend 3, 6\" steps with a RW and modified independence for safe home access. Long term goal 5: Patient will ascend/descend 2, 6\" steps with a hand rail and modified independence for garage entry. Long term goal 6: Patient will complete car transfer with modified independence to transfer in/out of vehicle safely. GOAL MET  Long term goal 7: Patient will improve tinetti to greater than or equal to 24/28 to reduce her risk for falls    Following session, patient left in safe position with all fall risk precautions in place.

## 2022-02-22 NOTE — PROGRESS NOTES
Patient: Miriam Will  Unit/Bed: 5F-04/549-Z  YOB: 1945  MRN: [id number] Acct: [de-identified]   Admitting Diagnosis: Cerebrospinal fluid leak due to lumbar surgery [G97.82, G96.00]  Admit Date:  2/11/2022  Hospital Day: 11    Assessment:     Principal Problem:    Spinal stenosis of lumbar region with radiculopathy  Active Problems:    Hyperlipidemia    HTN (hypertension)    Rheumatoid arthritis ([facility] [address]    Coronary artery disease involving native coronary artery of native heart without angina pectoris    Cerebrospinal fluid leak due to lumbar surgery    Atrial fibrillation ([facility])    Anemia    [place]-neck deformity of finger of both hands    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 *      Plan:     Medically stable for discharge tomorrow        Subjective:     Patient has no complaint of CP or SOB. .   Medication side effects: none    Scheduled Meds:   folic acid  661 mcg Oral Nightly    Multivitamin Gummies Adult  2 each Oral Daily    PreserVision AREDS 2  1 each Oral BID    cetirizine  10 mg Oral Daily    polyethylene glycol  17 g Oral Daily    bisacodyl  5 mg Oral Daily    sennosides-docusate sodium  1 tablet Oral BID    atorvastatin  10 mg Oral Nightly    clopidogrel  75 mg Oral QAM    digoxin  125 mcg Oral Daily    magnesium oxide  250 mg Oral Daily    methotrexate  22.5 mg Oral Weekly    metoprolol tartrate  37.5 mg Oral BID    naloxegol  12.5 mg Oral QAM    pantoprazole  40 mg Oral QAM AC     Continuous Infusions:  PRN Meds:traMADol, HYDROcodone 5 mg - acetaminophen **OR** [DISCONTINUED] HYDROcodone 5 mg - acetaminophen, acetaminophen, bisacodyl, magnesium hydroxide, diazePAM, magic (miracle) mouthwash, nitroGLYCERIN, ondansetron, melatonin    Review of Systems  Pertinent items are noted in HPI. Objective:     Patient Vitals for the past 8 hrs:   BP Temp Temp src Pulse Resp   02/22/22 0810 (!) 114/58 98.2 °F (36.8 °C) Oral 65 18     I/O last 3 completed shifts: In: 7432 [P.O.:1730]  Out: -   No intake/output data recorded.     BP (!) 114/58   Pulse 65   Temp 98.2 °F (36.8 °C) (Oral)   Resp 18   Ht 5' 2.01\" (1.575 m)   Wt 118 lb 3.2 oz (53.6 kg)   SpO2 98%   BMI 21.61 kg/m²     General appearance: alert, appears stated age and cooperative  Head: Normocephalic, without obvious abnormality, atraumatic  Lungs: clear to auscultation bilaterally  Heart: regular rate and rhythm, S1, S2 normal, no murmur, click, rub or gallop  Abdomen: soft, non-tender; bowel sounds normal; no masses,  no organomegaly  Extremities: extremities normal, atraumatic, no cyanosis or edema  Skin: Skin color, texture, turgor normal. No rashes or lesions  Neurologic: Grossly normal    Electronically signed by Denita Thompson MD on 2/22/2022 at 9:54 AM

## 2022-02-22 NOTE — PROGRESS NOTES
[address] [address]  Recreational Therapy  Daily Note  [address]    Time Spent with Patient: 25 minutes    Date:  2/22/2022       Patient Name: Oliver Haley      MRN: [id number]      YOB: 1945 (70 y.o.)       Gender: female  Diagnosis: spinal stenosis of lumbar region with radiculopathy  Referring Practitioner: LUANN Richter    RESTRICTIONS/PRECAUTIONS:  Restrictions/Precautions: General Precautions,Fall Risk,Surgical Protocols  Vision: Impaired  Hearing: Exceptions to [facility] Exceptions: Hard of hearing/hearing concerns    PAIN: 0-no c/o pain     SUBJECTIVE:  I had a bm last night and one this am and that was where all my pain was coming from    OBJECTIVE:  Pt states she had a bm last night and one this am and that was where all of her pain was coming from the last two days-states she is feeling much better today and is brighter and not so hogfeaky-qxsxoino-mtvlfzpfgy contact given          Patient Education  New Education Provided: Importance of Leisure,     Electronically signed by: TANISHA Joe  Date: 2/22/2022

## 2022-02-23 VITALS
SYSTOLIC BLOOD PRESSURE: 136 MMHG | WEIGHT: 118.2 LBS | TEMPERATURE: 98.3 F | RESPIRATION RATE: 16 BRPM | DIASTOLIC BLOOD PRESSURE: 65 MMHG | OXYGEN SATURATION: 96 % | HEIGHT: 62 IN | HEART RATE: 78 BPM | BODY MASS INDEX: 21.75 KG/M2

## 2022-02-23 PROCEDURE — 94760 N-INVAS EAR/PLS OXIMETRY 1: CPT

## 2022-02-23 PROCEDURE — 97110 THERAPEUTIC EXERCISES: CPT

## 2022-02-23 PROCEDURE — 6370000000 HC RX 637 (ALT 250 FOR IP): Performed by: NURSE PRACTITIONER

## 2022-02-23 PROCEDURE — 97530 THERAPEUTIC ACTIVITIES: CPT

## 2022-02-23 PROCEDURE — 97535 SELF CARE MNGMENT TRAINING: CPT

## 2022-02-23 PROCEDURE — 99239 HOSP IP/OBS DSCHRG MGMT >30: CPT | Performed by: PHYSICAL MEDICINE & REHABILITATION

## 2022-02-23 RX ADMIN — CETIRIZINE HYDROCHLORIDE 10 MG: 10 TABLET, FILM COATED ORAL at 08:11

## 2022-02-23 RX ADMIN — ACETAMINOPHEN 650 MG: 325 TABLET ORAL at 08:21

## 2022-02-23 RX ADMIN — POLYETHYLENE GLYCOL 3350 17 G: 17 POWDER, FOR SOLUTION ORAL at 08:11

## 2022-02-23 RX ADMIN — Medication 2 EACH: at 08:13

## 2022-02-23 RX ADMIN — SENNOSIDES AND DOCUSATE SODIUM 1 TABLET: 50; 8.6 TABLET ORAL at 08:11

## 2022-02-23 RX ADMIN — Medication 250 MG: at 08:11

## 2022-02-23 RX ADMIN — DIGOXIN 125 MCG: 125 TABLET ORAL at 08:11

## 2022-02-23 RX ADMIN — CLOPIDOGREL BISULFATE 75 MG: 75 TABLET, FILM COATED ORAL at 08:11

## 2022-02-23 RX ADMIN — METOPROLOL TARTRATE 37.5 MG: 25 TABLET, FILM COATED ORAL at 08:11

## 2022-02-23 RX ADMIN — PANTOPRAZOLE SODIUM 40 MG: 40 TABLET, DELAYED RELEASE ORAL at 06:08

## 2022-02-23 RX ADMIN — BISACODYL 5 MG: 5 TABLET ORAL at 08:11

## 2022-02-23 RX ADMIN — NALOXEGOL OXALATE 12.5 MG: 12.5 TABLET, FILM COATED ORAL at 08:11

## 2022-02-23 RX ADMIN — Medication 1 EACH: at 08:13

## 2022-02-23 ASSESSMENT — PAIN SCALES - GENERAL
PAINLEVEL_OUTOF10: 5
PAINLEVEL_OUTOF10: 0

## 2022-02-23 NOTE — PROGRESS NOTES
[address] [address]  Recreational Therapy  Discharge Note  Inpatient Rehabilitation Unit         Date:  2/23/2022       Patient Name: Dk Parra      MRN: [id number]       YOB: 1945 (70 y.o.)       Gender: female  Diagnosis: spinal stenosis of lumbar region with radiculopathy  Referring Practitioner: MEGHAN Bearden-MENA    Patient discharged from Recreational Therapy at this time. See recreational therapy notes for details.     Electronically signed by: Treasure Eisenmenger, CTRS  Date: 2/23/2022

## 2022-02-23 NOTE — PROGRESS NOTES
Patient: Rance Riedel  Unit/Bed: 7G-98/739-Q  YOB: 1945  MRN: [id number] Acct: [de-identified]   Admitting Diagnosis: Cerebrospinal fluid leak due to lumbar surgery [G97.82, G96.00]  Admit Date:  2/11/2022  Hospital Day: 12    Assessment:     Principal Problem:    Spinal stenosis of lumbar region with radiculopathy  Active Problems:    Hyperlipidemia    HTN (hypertension)    Rheumatoid arthritis ([facility] [address]    Coronary artery disease involving native coronary artery of native heart without angina pectoris    Cerebrospinal fluid leak due to lumbar surgery    Atrial fibrillation ([facility])    Anemia    [place]-neck deformity of finger of both hands    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 *      Plan:     Medically stable for discharge        Subjective:     Patient has no complaint of CP or SOB. .   Medication side effects: none    Scheduled Meds:   folic acid  445 mcg Oral Nightly    Multivitamin Gummies Adult  2 each Oral Daily    PreserVision AREDS 2  1 each Oral BID    cetirizine  10 mg Oral Daily    polyethylene glycol  17 g Oral Daily    bisacodyl  5 mg Oral Daily    sennosides-docusate sodium  1 tablet Oral BID    atorvastatin  10 mg Oral Nightly    clopidogrel  75 mg Oral QAM    digoxin  125 mcg Oral Daily    magnesium oxide  250 mg Oral Daily    methotrexate  22.5 mg Oral Weekly    metoprolol tartrate  37.5 mg Oral BID    naloxegol  12.5 mg Oral QAM    pantoprazole  40 mg Oral QAM AC     Continuous Infusions:  PRN Meds:traMADol, HYDROcodone 5 mg - acetaminophen **OR** [DISCONTINUED] HYDROcodone 5 mg - acetaminophen, acetaminophen, bisacodyl, magnesium hydroxide, diazePAM, magic (miracle) mouthwash, nitroGLYCERIN, ondansetron, melatonin    Review of Systems  Pertinent items are noted in HPI. Objective:     Patient Vitals for the past 8 hrs:   BP Temp Temp src Pulse Resp SpO2   02/23/22 0908     16 96 %   02/23/22 0800 136/65 98.3 °F (36.8 °C) Oral 78 18 97 %     No intake/output data recorded. No intake/output data recorded.     /65   Pulse 78   Temp 98.3 °F (36.8 °C) (Oral)   Resp 16   Ht 5' 2.01\" (1.575 m)   Wt 118 lb 3.2 oz (53.6 kg)   SpO2 96%   BMI 21.61 kg/m²     General appearance: alert, appears stated age and cooperative  Head: Normocephalic, without obvious abnormality, atraumatic  Lungs: clear to auscultation bilaterally  Heart: regular rate and rhythm, S1, S2 normal, no murmur, click, rub or gallop  Abdomen: soft, non-tender; bowel sounds normal; no masses,  no organomegaly  Extremities: extremities normal, atraumatic, no cyanosis or edema  Skin: Skin color, texture, turgor normal. No rashes or lesions  Neurologic: Grossly normal    Electronically signed by Denita Thompson MD on 2/23/2022 at 12:40 PM

## 2022-02-23 NOTE — PROGRESS NOTES
[address] [address]  Recreational Therapy  Daily Note  [address]    Time Spent with Patient: 25 minutes    Date:  2/23/2022       Patient Name: Lisbeth Borden      MRN: [id number]      YOB: 1945 (70 y.o.)       Gender: female  Diagnosis: spinal stenosis of lumbar region with radiculopathy  Referring Practitioner: LUANN Bowman    RESTRICTIONS/PRECAUTIONS:  Restrictions/Precautions: General Precautions,Fall Risk,Surgical Protocols  Vision: Impaired  Hearing: Exceptions to [facility] Exceptions: Hard of hearing/hearing concerns    PAIN: 0    SUBJECTIVE:  I am ready to go home    OBJECTIVE:  Pt states she slept well last night-her daughter in law is picking her up to day and bringing her home -she was disappointed that she did not put the puzzle together that I gave her in her room-affect bright and upbeat-supportive contact given         Patient Education  New Education Provided: Importance of Leisure,     Electronically signed by: TANISHA Workman  Date: 2/23/2022

## 2022-02-23 NOTE — PROGRESS NOTES
[facility]  INPATIENT PHYSICAL THERAPY  DISCHARGE NOTE  3 [facility]    Time In: 1605  Time Out: 1430  Timed Code Treatment Minutes: 18 Minutes  Minutes: 18          Date: 2022  Patient Name: Joel Nichols,  Gender:  female        MRN: [id number]  : 1945  (77 y.o.)  Referral Date : 22  Referring Practitioner: LUANN Lloyd (referring), Glynda Fothergill, MD  Diagnosis: Spinal stenosis of lumbar region with radiculopathy  Additional Pertinent Hx: Per H&P: \"Gail Alegria  is a 77 y.o. right-handed  female with a history of hypertension, GERD, hyperlipidemia, thyroid nodule, atrial fibrillation requiring WATCHMAN procedure, rheumatoid arthritis with bilateral hand fingers deformity, COPD and emphysema, diverticulosis, coronary artery disease requiring coronary arteries angioplasty and stenting, congestive heart failure, anemia, status post pacemaker placement, status post bilateral total knee arthroplasties and bilateral total hip arthroplasty, status post multiple bilateral feet surgeries for deformity, status post lumbar spine surgeries in  and , status post appendectomy, hysterectomy, hemorrhoidectomy, is admitted to the inpatient rehabilitation unit on 2022 for intensive inpatient rehabilitation treatment of impaired ADLs and ambulation secondary to lumbar spinal stenosis with left lower extremity radiculopathy requiring lumbar laminectomy and disc excision on 2022 by Dr. Saurabh Pena. \"  Pt transferred to inpatient rehab 22.      Prior Level of Function:  Lives With: Alone  Type of Home: House  Home Layout: One level  Home Access: Stairs to enter without rails  Entrance Stairs - Number of Steps: 2 steps to garage with left hand rail, 1 + 1 +1 step into home  Home Equipment: Rolling walker,Standard walker,Wheelchair-manual,Cane   Bathroom Shower/Tub: Tub/Shower unit  Bathroom Toilet: Handicap height  Bathroom Equipment: Grab bars in shower (reports she has access to a shower chair with a back)  Bathroom Accessibility: Accessible (patient states she will have difficulty getting a 2 w/w into the BR at home. will have to side step or furniture walk)    Receives Help From: Friend(s)  ADL Assistance: [address]: Independent  Homemaking Responsibilities: Yes  Ambulation Assistance: Independent  Transfer Assistance: Independent  Active : Yes  Additional Comments: Pt IND prior, family in the area to help as needed. Pt was using cane about 1-2 weeks prior to admission due to pain    Restrictions/Precautions:  Restrictions/Precautions: General Precautions,Fall Risk,Surgical Protocols  Required Braces or Orthoses  Spinal: Lumbar Corset  Position Activity Restriction  Spinal Precautions: No Bending,No Lifting,No Twisting  Other position/activity restrictions: L1-L4 Laminectomy with complication (durotomy) on 2/4     SUBJECTIVE: Patient in bedside chair upon arrival and agreeable to therapy. Patient's daughter-in-law present for family education. PAIN: denies    Vitals: Vitals not assessed per clinical judgement, see nursing flowsheet    OBJECTIVE:  Bed Mobility:  Rolling to Right: Modified Independent   Supine to Sit: Modified Independent  Sit to Supine: Modified Independent    **family education: on log roll technique, completed x3 trials, used step stool with elevated bed height to simulate home. Transfers:  Sit to Stand: Modified Independent  Stand to Sit:Modified Independent    Ambulation:  Modified Independent  Distance: ~175ft x2  Surface: Level Tile  Device:Rolling Walker  Gait Deviations:  Slow Tianna, Decreased Step Length Bilaterally and Decreased Gait Speed    Stairs:  Contact Guard Assistance  Number of Steps: 1 (side-stepping onto step stool for bed mobility to simulate home.)  Height: 8\" step with Rolling Walker    Exercise:  None this session.      Functional Outcome Measures: Not completed       ASSESSMENT:  Assessment: Patient progressing toward established goals, meeting all short term goals and 4/7 long term goals. CGA recommended yet with stairs. Pt is showing 21/28 on Tinetti, indicating improved balance, though in the moderate fall risk category. Pt will benefit from skilled [facility] PT in the discharge setting. HEP was issued and reviewed. Activity Tolerance:  Patient tolerance of  treatment: good. Equipment Recommendations:Equipment Needed: No  Discharge Recommendations: Home with Home Health PT  Plan: Times per week: 5x/wk 90min, 1x/wk 30min  Times per day: Daily  Plan weeks: 2  Current Treatment Recommendations: Adam Freed Re-education,Patient/Caregiver Education & Training,Balance Training,Gait Training,Home Exercise Program,Functional Mobility Training,Stair training,Safety Education & Training    Patient Education  Patient Education: Plan of Care, Precautions/Restrictions, [employer] Mobility, Transfers, Gait    Goals:  Patient goals : \"be able to be self sufficient\"  Short term goals  Time Frame for Short term goals: 1 week  Short term goal 1: Patient will complete supine < > sit with SBA to transfer in/out of bed with decreased difficulty. GOAL MET  Short term goal 2: Patient will complete sit < > stand with SBA with a RW to stand to ambulate with decreased difficulty. MET, SEE LTG  Short term goal 3: Patient will ambulate 80' with a RW and SBA to progress towards navigating home safely. MET, SEE LTG  Short term goal 4: Patient will ascend/descend 2, 6\" step with a RW with CGA to progress towards safe home entry. MET, SEE LTG  Short term goal 5: Patient will improve tinetti to greater than or equal to 19/28 to reduce her risk for falls.  MET, SEE LTG  Long term goals  Time Frame for Long term goals : 2 weeks  Long term goal 1: Patient will complete supine  < > sit with modified independence to transfer

## 2022-02-23 NOTE — PROGRESS NOTES
Team conference held Wednesday, 02/23/2022. Recommendations of the team were explained to the patient by GALLITO Queen, and Dr. Agus Ferrera. Team is recommending that patient be discharged home today, 02/23/2022, from acute inpatient rehab. Patient's daughter-in-law, Silverio Lancaster, scheduled to complete family education prior to discharge. Following discharge, team is recommending home health care services for RN/PT/OT/HHA. Home health care services arranged through [facility]. Care plan reviewed with patient. Patient verbalized understanding of the plan of care and contribute to goal setting. SW contacted [facility] with discharge notification of today, 02/23/2022. Information provided to [address] [place] [address]. Discharge instructions, face to face encounter, and discharge summary faxed to agency for continuity of care.

## 2022-02-23 NOTE — PROGRESS NOTES
[address]  Occupational Therapy  Daily Note  Time:    Time In: 1130  Time Out: 1200  Timed Code Treatment Minutes: 30 Minutes  Minutes: 30          Date: 2022  Patient Name: Annalee Greenwood,   Gender: female      Room: [facility]059-A  MRN: [id number]  : 1945  (77 y.o.)  Referring Practitioner: Charmayne Fortune, APRN-CNP  Diagnosis: spinal stenosis of lumbar region with radiculopathy  Additional Pertinent Hx: Per EMR \"Gail is a 77y/o female known to our office having been seen in the past several months with complaints of low back pain and leg pain. She has been through multiple epidural injections, most recently last month, with some mild-moderate relief of her pain. Over the past several days her pain had been worsening significantly to the point it was completely uncontrolled and her mobility had declined significantly. She called our office and the only recommendation was for her to present to the ED for evaluation and admission for further care. She does have a history of previous lumbar spine surgery roughly 20yrs ago. History of pacemaker, afib and is on plavix as well. She describes a sharp pain traveling from the low back into the left lateral/anterior thigh are. No new changes in bowel/bladder continence. \" L1-L4 Laminectomy with complication (durotomy) on     Restrictions/Precautions:  Restrictions/Precautions: General Precautions,Fall Risk,Surgical Protocols  Required Braces or Orthoses  Spinal: Lumbar Corset  Position Activity Restriction  Spinal Precautions: No Bending,No Lifting,No Twisting  Other position/activity restrictions: L1-L4 Laminectomy with complication (durotomy) on       SUBJECTIVE: cooperative, eager to go home today    PAIN: 0/10: no c/o pain during session    Vitals: Vitals not assessed per clinical judgement, see nursing flowsheet    COGNITION: WFL    ADL:   No ADL's completed this session. Chao Delgado     BALANCE:  Standing Balance: Modified Independent. BED MOBILITY:  Not Tested    TRANSFERS:  Sit to Stand:  Modified Independent. Stand to Sit: Modified Independent. FUNCTIONAL MOBILITY:  Assistive Device: Rolling Walker  Assist Level:  Modified Independent. Distance: around room   IADL task of getting clothes from closet with mod I, 1 vc for back precautions     ADDITIONAL ACTIVITIES:  Therapist put elastic shoelaces in Pt's shoes & gave Pt handouts to locate travel [address] for home. ASSESSMENT:     Activity Tolerance:  Patient tolerance of  treatment: fair. Discharge Recommendations: Continue to assess pending progress  Equipment Recommendations: Equipment Needed: Yes  Mobility Devices: ADL Assistive Devices  Other: Pt. Reports she will be utilizing her daughter in laws shower chair with a back for showering purposes. She will be ordering a sock aid, dressing stick and walker tray table. Pt. Reports she owns a reacher, raised toilet seat and long handled shoe horn. Plan: Times per week: 5 x / wk for 90 minutes, 1 x / wk for 30 minutes  Times per day: Daily  Current Treatment Recommendations: Zena Masters Re-education,Patient/Caregiver Education & Training,Self-Care / ADL,Equipment Evaluation, Education, & procurement,Safety Education & Training    Patient Education  Patient Education: see above    Goals  Short term goals  Time Frame for Short term goals: by discharge  Short term goal 1: patient will tolerate 7 min functional standing with two hand release with (S) to increase activity tolerance for grooming and toileting. Short term goal 2: GOAL MET  Short term goal 3: Discontinued  Short term goal 4: Discontinued  Short term goal 5: GOAL MET  Long term goals  Time Frame for Long term goals : 2 weeks  Long term goal 1: GOAL MET  Long term goal 2: patient will complete light meal prep task with MOD I within spinal precautions.   Long term goal 3: patient will complete grocery shopping task with MOD I using strategies to reach items within spinal precautions. Long term goal 4: patient will complete ADL routine with MOD I with use of AE PRN. Long term goal 5: patient will retrieve items from various heights with least restrictive device and AE PRN with MOD I to safely transition to prior living environment to complete IADLs. Following session, patient left in safe position with all fall risk precautions in place.

## 2022-02-23 NOTE — PROGRESS NOTES
Patient discharged in stable condition as per order of attending physician to Home with [facility] per staff at Time: [address] Wheel[address] to car. AVS provided by RN at time of discharge, which includes all necessary medical information pertaining to the patients current course of illness, treatment, medications, post-discharge goals of care, and treatment preferences. Patient/ family verbalize understanding of discharge plan and are in agreement with goal/plan/treatment preferences. Belongings including Glasses sent with patient. Home medications sent home with patient yes    Availability of \"My Chart\" offered to patient as a tool for updated health record.   Steps for activation discussed with patient as mentioned on AVS.

## 2022-02-23 NOTE — DISCHARGE SUMMARY
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  No Usman's sign ; no Babinski sign ; no ankle clonus  Gait : Not assessed        Diagnostics:  Recent Results   No results found for this or any previous visit (from the past 24 hour(s)).        Results for Fritz Ivy (MRN [id number]) as of 2/11/2022 17:20    Ref. Range 2/7/2022 04:52   Sodium Latest Ref Range: 135 - 145 meq/L 136   Potassium Latest Ref Range: 3.5 - 5.2 meq/L 3.5   Chloride Latest Ref Range: 98 - 111 meq/L 103   CO2 Latest Ref Range: 23 - 33 meq/L 24   BUN Latest Ref Range: 7 - 22 mg/dL 8   Creatinine Latest Ref Range: 0.4 - 1.2 mg/dL 0.3 (L)   Anion Gap Latest Ref Range: 8.0 - 16.0 meq/L 9.0   Est, Glom Filt Rate Latest Units: ml/min/1.73m2 >90   Magnesium Latest Ref Range: 1.6 - 2.4 mg/dL 1.6   Glucose Latest Ref Range: 70 - 108 mg/dL 87   CALCIUM, SERUM, [id number] Latest Ref Range: 8.5 - 10.5 mg/dL 8.0 (L)      Results for Fritz Ivy (MRN [id number]) as of 2/11/2022 17:20    Ref.  Range 2/7/2022 04:52   WBC Latest Ref Range: 4.8 - 10.8 thou/mm3 10.8   RBC Latest Ref Range: 4.20 - 5.40 mill/mm3 3.32 (L)   Hemoglobin Quant Latest Ref Range: 12.0 - 16.0 gm/dl 10.2 (L)   Hematocrit Latest Ref Range: 37.0 - 47.0 % 32.7 (L)   MCV Latest Ref Range: 81.0 - 99.0 fL 98.5   MCH Latest Ref Range: 26.0 - 33.0 pg 30.7   MCHC Latest Ref Range: 32.2 - 35.5 gm/dl 31.2 (L)   MPV Latest Ref Range: 9.4 - 12.4 fL 11.3   RDW-CV Latest Ref Range: 11.5 - 14.5 % 13.7   RDW-SD Latest Ref Range: 35.0 - 45.0 fL 49.5 (H)   Platelet Count Latest Ref Range: 130 - 400 thou/mm3 208   Lymphocytes Absolute Latest Ref Range: 1.0 - 4.8 thou/mm3 2.1   Monocytes Absolute Latest Ref Range: 0.4 - 1.3 thou/mm3 1.3   Eosinophils Absolute Latest Ref Range: 0.0 - 0.4 thou/mm3 0.1   Basophils Absolute Latest Ref Range: 0.0 - 0.1 thou/mm3 0.0   Seg Neutrophils Latest Units: % 67.8   Segs Absolute Latest Ref Range: 1.8 - 7.7 thou/mm3 7.3   Lymphocytes Latest Units: % 19.3   Monocytes Latest Units: % 11.7   Eosinophils Latest Units: % 0.6   Basophils Latest Units: % 0.3   Immature Grans (Abs) Latest Ref Range: 0.00 - 0.07 thou/mm3 0.03   Immature Granulocytes Latest Units: % 0.3      Results for Leonard Swan (MRN [id number]) as of 2/11/2022 17:20    Ref. Range 1/30/2022 17:00   Rh Factor Unknown POS         Lumbar spine MRI without contrast (8/19/2021) :     Impression    1 left hemilaminectomies at L4-5 and L5-S1 without evidence of acute abnormality. 2. Multilevel degenerative changes most pronounced at L2-3 and L3-4 where there is moderate to severe spinal canal stenosis.       Portable chest x-ray (1/30/2022) : Impression   1. Nonspecific hazy opacities in the right lung base.  Findings can relate to atelectasis or infiltrate.         Echocardiogram (1/30/2022) :  Conclusions Summary   Normal left ventricle size and systolic function.   Ejection fraction was estimated at 55 %.   There were no regional left ventricular wall motion abnormalities and wall   thickness was within normal limits.   The left atrium is Moderately dilated.   Small secundum atrial septal defect with left-to-right shunt was   visualized with velocity of 1.5 m/sec.   Mild to Moderately enlarged right atrium size.        Stress Test (2/1/2022) :  Conclusions Summary   Lexiscan EKG stress test is not suggestive for ischemia due to baseline   LBBB   This Nuclear Medicine study was negative for ischemia .           The post admission physician evaluation (PATRICIA) is consistent with the pre-admission assessment. See above findings to reflect the elements required in the PATRICIA. Patient's admitting condition is consistent with the findings of the preadmission assessment by the rehabilitation admissions coordinator.           Inpatient Rehabilitation Course:   Mitch Phillips is a 77 y.o. female admitted to inpatient rehabilitation on 2/11/2022 for improved mobility and ADL's. The patient participated in an aggressive multidisciplinary inpatient rehabilitation program involving 3 hours per day, 5 days per week of rehabilitation. Diabetic management was maximized with diet and medication options to attempt to achieve blood sugar control between . Hypertension management was undertaken with medication management on any patient with systolic blood pressure greater than 262 or diastolic blood pressure greater than 90. Hyperlipidemia was considered and the patient was started or continued on a statin medication for any LDL>100. Appropriate stroke prophylaxis was maintained throughout rehabilitation stay with Plavix 75 mg po daily. Appropriate DVT prophylaxis options were continued throughout rehabilitation stay with Plavix 75 mg po daily and EPC cuffs. Dr Devi Zuñiga followed during the IPR stay for medical management    Patient was discharged Home with [place] in Stable condition.     Consults:   Family Medicine, dietary    Significant Diagnostics:   CBC:   Lab Results   Component Value Date WBC 7.8 02/21/2022    RBC 3.50 02/21/2022    HGB 10.4 02/21/2022    HCT 34.6 02/21/2022    MCV 98.9 02/21/2022    MCH 29.7 02/21/2022    MCHC 30.1 02/21/2022    RDW 14.3 01/08/2021     02/21/2022    MPV 10.4 02/21/2022     BMP:    Lab Results   Component Value Date     02/21/2022    K 3.9 02/21/2022    K 4.0 02/12/2022     02/21/2022    CO2 28 02/21/2022    BUN 18 02/21/2022    LABALBU 2.3 02/12/2022    CREATININE 0.4 02/21/2022    CALCIUM 9.3 02/21/2022    GFRAA >60 01/08/2021    LABGLOM >90 02/21/2022    GLUCOSE 90 02/21/2022        No results for input(s): POCGLU in the last 72 hours. Cholesterol Panel:   No results found for requested labs within last 30 days. Patient Instructions:   Home with Home Health   Therapy orders: PT, OT and HHA and RN   Discharge lab work: n/a  Code status: Full Code   Activity: activity as tolerated and no driving for today  Diet: ADULT DIET; Regular  ADULT ORAL NUTRITION SUPPLEMENT; Breakfast, Lunch, Dinner; Standard 4 oz Oral Supplement  ADULT ORAL NUTRITION SUPPLEMENT; Breakfast, Lunch, Dinner; Other Oral Supplement; activia and hot beverage at B,L and D    Wound Care: none needed  Equipment:  Pt. Reports she will be utilizing her daughter in laws shower chair with a back for showering purposes. She will be ordering a sock aid, dressing stick and walker tray table. Pt. Reports she owns a reacher, raised toilet seat and long handled shoe horn.     Follow-up visits: See after visit summary from hospitalization       Discharge Medications:  Discharge Medication List as of 2/23/2022  7:57 AM           Details   naloxegol (MOVANTIK) 12.5 MG TABS tablet Take 1 tablet by mouth every morning for 15 days, Disp-15 tablet, R-0Normal      melatonin 3 MG TABS tablet Take 1 tablet by mouth nightly as needed (insomnia), Disp-30 tablet, R-3Normal      sennosides-docusate sodium (SENOKOT-S) 8.6-50 MG tablet Take 1 tablet by mouth 2 times daily, Disp-60 tablet, R-0Normal bisacodyl (DULCOLAX) 5 MG EC tablet Take 1 tablet by mouth daily, Disp-30 tablet, R-0Normal      diazePAM (VALIUM) 5 MG tablet Take 1 tablet by mouth every 8 hours as needed (muscle spasms) for up to 10 days. , Disp-30 tablet, R-0Normal      traMADol (ULTRAM) 50 MG tablet Take 1 tablet by mouth every 6 hours as needed for Pain for up to 7 days. , Disp-28 tablet, R-0Normal              Details   lansoprazole (PREVACID) 30 MG delayed release capsule Take 1 capsule by mouth every morning, Disp-30 capsule, R-0Normal              Details   digoxin (LANOXIN) 125 MCG tablet Take 125 mcg by mouth dailyHistorical Med      magnesium (MAGNESIUM-OXIDE) 250 MG TABS tablet Take 250 mg by mouth dailyHistorical Med      denosumab (PROLIA) 60 MG/ML SOSY SC injection Inject 60 mg into the skin every 6 months Next dose in MarchHistorical Med      Multiple Vitamins-Minerals (PRESERVISION AREDS PO) Take 1 tablet by mouth 2 times dailyHistorical Med      metoprolol tartrate (LOPRESSOR) 25 MG tablet Take 37.5 mg by mouth 2 times daily Historical Med      folic acid (FOLVITE) 160 MCG tablet Take 800 mcg by mouth nightly Over The Counter Historical Med      Psyllium (METAMUCIL PO) Take  by mouth every morning. Over The Counter      !! UNABLE TO FIND Take  by mouth every morning. Over The Counter Maverick Multivitamin For Women      Calcium Carbonate-Vitamin D (CALTRATE 600+D) 600-400 MG-UNIT CHEW Take by mouth 2 times daily Over The Counter Historical Med      !! UNABLE TO FIND Take  by mouth every morning. Over The Counter Chewable Vitamin C      clopidogrel (PLAVIX) 75 MG tablet Take 75 mg by mouth every morning. methotrexate 2.5 MG tablet Take 9 tablets (22.5 mg) by mouth once weekly on FridaysHistorical Med      nitroGLYCERIN (NITROSTAT) 0.4 MG SL tablet Place 0.4 mg under the tongue every 5 minutes as needed. atorvastatin (LIPITOR) 10 MG tablet Take 10 mg by mouth nightly. !! - Potential duplicate medications found. Please discuss with provider. Controlled substances monitoring: possible medication side effects, risk of tolerance and/or dependence, and alternative treatments discussed and no signs of potential drug abuse or diversion identified.      40 minutes spent preparing the patient for discharge    Yannick Linn MD

## 2022-02-24 ENCOUNTER — TELEPHONE (OUTPATIENT)
Dept: CARDIOLOGY CLINIC | Age: 77
End: 2022-02-24

## 2022-02-24 NOTE — TELEPHONE ENCOUNTER
Patient is scheduled for an appt with Carolyn Webb on 03/10/2022. She has some questions regarding this appt.   Please contact pt at [phone]

## 2022-02-24 NOTE — TELEPHONE ENCOUNTER
Patient called and wants to know if she can just follow with her cardiologist at [facility] instead of seeing Dr. Silvia Colvin on 3/9/2022. Discussed with Bandar Magdaleno and she will call patient.

## 2022-02-24 NOTE — TELEPHONE ENCOUNTER
Spoke to the patient and she would like to follow with Dr. Yumiko Monaco at [facility] which is her cardiologist. She has an appointment on 3/23/2022. Baylee Kaba states that she will call down there to see if they can get her in sooner. She also wanted to ensure that the cardiac test would be able to be seen through epic. It was suggested to ask his office to check and if there is issue to call.

## 2022-02-24 NOTE — TELEPHONE ENCOUNTER
Spoke with pt and Gregorio Mar. Gregorio Mar will look into this and call pt back. Send to staff message also.

## 2022-03-18 RX ORDER — OMEPRAZOLE MAGNESIUM 20 MG
CAPSULE,DELAYED RELEASE (ENTERIC COATED) ORAL
Qty: 30 TABLET | Refills: 3 | OUTPATIENT
Start: 2022-03-18

## 2022-10-28 ENCOUNTER — HOSPITAL ENCOUNTER (OUTPATIENT)
Age: 77
Setting detail: OUTPATIENT SURGERY
Discharge: HOME | End: 2022-10-28
Attending: PODIATRIST | Admitting: PODIATRIST
Payer: MEDICARE

## 2022-10-28 VITALS
RESPIRATION RATE: 18 BRPM | HEART RATE: 63 BPM | TEMPERATURE: 98.7 F | SYSTOLIC BLOOD PRESSURE: 144 MMHG | DIASTOLIC BLOOD PRESSURE: 75 MMHG | OXYGEN SATURATION: 100 %

## 2022-10-28 DIAGNOSIS — Z48.89 AFTERCARE FOLLOWING SURGERY: Primary | ICD-10-CM

## 2022-10-28 PROCEDURE — 2709999900 HC NON-CHARGEABLE SUPPLY: Performed by: PODIATRIST

## 2022-10-28 PROCEDURE — 2500000003 HC RX 250 WO HCPCS: Performed by: PODIATRIST

## 2022-10-28 PROCEDURE — 7100000010 HC PHASE II RECOVERY - FIRST 15 MIN: Performed by: PODIATRIST

## 2022-10-28 PROCEDURE — 3600000001 HC SURGERY LEVEL 1  BASE: Performed by: PODIATRIST

## 2022-10-28 PROCEDURE — C1762 CONN TISS, HUMAN(INC FASCIA): HCPCS

## 2022-10-28 PROCEDURE — 7100000011 HC PHASE II RECOVERY - ADDTL 15 MIN: Performed by: PODIATRIST

## 2022-10-28 PROCEDURE — 3600000011 HC SURGERY LEVEL 1  ADDTL 15MIN: Performed by: PODIATRIST

## 2022-10-28 DEVICE — ALLOGRAFT ADIPOSE EXTRACELLULAR MTRX 3 CC LENEVA: Type: IMPLANTABLE DEVICE | Site: FOOT | Status: FUNCTIONAL

## 2022-10-28 RX ORDER — LIDOCAINE HYDROCHLORIDE 10 MG/ML
INJECTION, SOLUTION EPIDURAL; INFILTRATION; INTRACAUDAL; PERINEURAL
Status: COMPLETED | OUTPATIENT
Start: 2022-10-28 | End: 2022-10-28

## 2022-10-28 ASSESSMENT — PAIN SCALES - GENERAL
PAINLEVEL_OUTOF10: 3
PAINLEVEL_OUTOF10: 10

## 2022-10-28 NOTE — DISCHARGE INSTRUCTIONS
*Please follow instructions given to you by Dr. Hermilo Conroy. *    Advance Care Planning  People with COVID-19 may have no symptoms, mild symptoms, such as fever, cough, and shortness of breath or they may have more severe illness, developing severe and fatal pneumonia. As a result, Advance Care Planning with attention to naming a health care decision maker (someone you trust to make healthcare decisions for you if you could not speak for yourself) and sharing other health care preferences is important BEFORE a possible health crisis. Please contact your Primary Care Provider to discuss Advance Care Planning. Preventing the Spread of Coronavirus Disease 2019 in Homes and Residential Communities  For the most recent information go to [employer].    Prevention steps for People with confirmed or suspected COVID-19 (including persons under investigation) who do not need to be hospitalized  and   People with confirmed COVID-19 who were hospitalized and determined to be medically stable to go home    Your healthcare provider and public health staff will evaluate whether you can be cared for at home. If it is determined that you do not need to be hospitalized and can be isolated at home, you will be monitored by staff from your local or state health department. You should follow the prevention steps below until a healthcare provider or local or state health department says you can return to your normal activities. Stay home except to get medical care  People who are mildly ill with COVID-19 are able to isolate at home during their illness. You should restrict activities outside your home, except for getting medical care. Do not go to work, school, or public areas. Avoid using public transportation, ride-sharing, or taxis.   Separate yourself from other people and animals in your home  People: As much as possible, you should stay in a specific room and away from other people in your home. Also, you should use a separate bathroom, if available. Animals: You should restrict contact with pets and other animals while you are sick with COVID-19, just like you would around other people. Although there have not been reports of pets or other animals becoming sick with COVID-19, it is still recommended that people sick with COVID-19 limit contact with animals until more information is known about the virus. When possible, have another member of your household care for your animals while you are sick. If you are sick with COVID-19, avoid contact with your pet, including petting, snuggling, being kissed or licked, and sharing food. If you must care for your pet or be around animals while you are sick, wash your hands before and after you interact with pets and wear a facemask. Call ahead before visiting your doctor  If you have a medical appointment, call the healthcare provider and tell them that you have or may have COVID-19. This will help the healthcare providers office take steps to keep other people from getting infected or exposed. Wear a facemask  You should wear a facemask when you are around other people (e.g., sharing a room or vehicle) or pets and before you enter a healthcare providers office. If you are not able to wear a facemask (for example, because it causes trouble breathing), then people who live with you should not stay in the same room with you, or they should wear a facemask if they enter your room. Cover your coughs and sneezes  Cover your mouth and nose with a tissue when you cough or sneeze. Throw used tissues in a lined trash can. Immediately wash your hands with soap and water for at least 20 seconds or, if soap and water are not available, clean your hands with an alcohol-based hand  that contains at least 60% alcohol.   Clean your hands often  Wash your hands often with soap and water for at least 20 seconds, especially after blowing your nose, coughing, or sneezing; going to the bathroom; and before eating or preparing food. If soap and water are not readily available, use an alcohol-based hand  with at least 60% alcohol, covering all surfaces of your hands and rubbing them together until they feel dry. Soap and water are the best option if hands are visibly dirty. Avoid touching your eyes, nose, and mouth with unwashed hands. Avoid sharing personal household items  You should not share dishes, drinking glasses, cups, eating utensils, towels, or bedding with other people or pets in your home. After using these items, they should be washed thoroughly with soap and water. Clean all high-touch surfaces everyday  High touch surfaces include counters, tabletops, doorknobs, bathroom fixtures, toilets, phones, keyboards, tablets, and bedside tables. Also, clean any surfaces that may have blood, stool, or body fluids on them. Use a household cleaning spray or wipe, according to the label instructions. Labels contain instructions for safe and effective use of the cleaning product including precautions you should take when applying the product, such as wearing gloves and making sure you have good ventilation during use of the product. Monitor your symptoms  Seek prompt medical attention if your illness is worsening (e.g., difficulty breathing). Before seeking care, call your healthcare provider and tell them that you have, or are being evaluated for, COVID-19. Put on a facemask before you enter the facility. These steps will help the healthcare providers office to keep other people in the office or waiting room from getting infected or exposed. Ask your healthcare provider to call the local or Critical access hospital health department. Persons who are placed under active monitoring or facilitated self-monitoring should follow instructions provided by their local health department or occupational health professionals, as appropriate.  When working with your local health department check their available hours. If you have a medical emergency and need to call 911, notify the dispatch personnel that you have, or are being evaluated for COVID-19. If possible, put on a facemask before emergency medical services arrive. Discontinuing home isolation  Patients with confirmed COVID-19 should remain under home isolation precautions until the risk of secondary transmission to others is thought to be low. The decision to discontinue home isolation precautions should be made on a case-by-case basis, in consultation with healthcare providers and state and local health departments.

## 2022-10-28 NOTE — H&P
Update History & Physical    The patient's History and Physical of October 13, 2022 was reviewed with the patient and I examined the patient. There was no change. The surgical site was confirmed by the patient and me. Plan: The risks, benefits, expected outcome, and alternative to the recommended procedure have been discussed with the patient. Patient understands and wants to proceed with the procedure.      Electronically signed by Eden Campbell DPM on 10/28/2022 at 12:55 PM

## 2022-10-28 NOTE — BRIEF OP NOTE
Brief Postoperative Note      Patient: Marshal Robles  YOB: 1945  MRN: [id number]    Date of Procedure: 10/28/2022    Pre-Op Diagnosis:     Acquired deformity left foot  Rheumatoid arthritis  Corns and callosities [L84]  Pain in left foot [M79.672]    Post-Op Diagnosis: Same       Procedure(s):  LEFT FOOT LENEVA INJECTION MEDICATION 3CC    Surgeon(s):  Juan Anderson DPM    Assistant:  * No surgical staff found *    Anesthesia: None    Estimated Blood Loss (mL): Minimal    Complications: None    Specimens:   * No specimens in log *    Implants:  * No implants in log *      Drains: * No LDAs found *    Findings: CALLUS SUB 1ST MET HEAD LEFT FOOT, REMOVED, NO DEEP ULCERATION NOTED    Electronically signed by Juan Anderson DPM on 10/28/2022 at 1:51 PM

## 2022-10-28 NOTE — PROGRESS NOTES
Pt. Awake, alert, and oriented x 4. No IV. Pt. Did not receive any anesthesia for her procedure. Pt. Has clean/dry/intact dressing on left foot. Denies pain or nausea at this time. Reviewed discharge instructions with pt. Dr. Greg Garibay in to talk with pt. She is ready to be discharged home.

## 2022-10-29 NOTE — OP NOTE
[place] [address] [address]                                OPERATIVE REPORT    PATIENT NAME: Elizabeth Hutton                     :        1945  MED REC NO:   [id number]                          ROOM:  ACCOUNT NO:   [de-identified]                           ADMIT DATE: 10/28/2022  PROVIDER:     Grabiel Barahona DPM    DATE OF PROCEDURE:  10/28/2022    SURGEON:  Grabiel Barahona DPM    PREOPERATIVE DIAGNOSES:  1. Pain in the left foot. 2.  Corn and callus, left foot. 3.  Deformity of the left foot. 4.  Rheumatoid arthritis. POSTOPERATIVE DIAGNOSES:  1. Pain in the left foot. 2.  Corn and callus, left foot. 3.  Deformity of the left foot. 4.  Rheumatoid arthritis. PROCEDURE PERFORMED:  Allograft 3 mL injection, left foot. PATHOLOGY:  Zero. ANESTHESIA:  Local.    HEMOSTASIS:  None. ESTIMATED BLOOD LOSS:  1 mL. MATERIALS:  3 mL Leneva allograft fat injection. INJECTABLES:  5 mL of 1% lidocaine plain injection. COMPLICATIONS:  Zero. INDICATION FOR PROCEDURE:  The patient is a pleasant 20-year-old female  with rheumatoid arthritis. The patient had rheumatoid reconstruction to  her left foot several years previously and is doing very well with the  exception of the area under the first metatarsal head, left foot. She  has painful callus with recurrent ulceration to that area. The patient  has significant decrease in fat pad to the entire forefoot stemming from  years of deformity to that foot. Following the correction, pain  significantly subsided to the entire foot; however, persistent pain is  noted under first metatarsal head stemming from the above-noted  conditions. The patient did wish to have injection to the area to  restore cushion underneath the first metatarsal and decrease plantar  foot pain as well as to reduce risk of recurrence of the callus and  ulceration.   Clinical and radiographic evaluation correlated with the  above diagnosis. Consent signed and reviewed in chart. No guarantees  made as to the outcome of the procedure. The patient's questions and  concerns were answered prior to the start of the procedure, and the  patient wished to proceed with the procedure at this time. PROCEDURE IN DETAIL:  The patient was brought to the operating room,  placed on the operating table in the supine position. The foot was  scrubbed, prepped, and draped in the usual aseptic manner. The left  foot was inspected. 5 mL of 1% lidocaine plain was injected proximally  and medially to the sub-first metatarsal head. 3 mL of Leneva was then  injected directly under the first metatarsal head in the area of the  noted callus and prior ulceration. Danton Cocks was raised with definitive  increase in fat under the first metatarsal head specifically. The area  was then cleansed with alcohol and a well-padded dressing was applied  consisting of 2 unrolled Truman 3-inch wraps placed distally and  proximally to the sub-first metatarsal head, held securely with a 4 x 8,  and then a 4-inch Kerlix was used to wrap and hold the dressing in  place. This was held securely in place with a 3-inch Ace bandage. The  patient tolerated the procedure and anesthesia well and was transferred  to PACU with vital signs stable and vascular status intact to all toes  of the left foot. The patient will be discharged home. She will be  placed into her CAM boot which she is to remain. She is able to  ambulate for transfers, leaving the dressing clean, dry, and intact. She will return to my office in one week for first dressing change. Any  postoperative complications, please have me paged directly at  [phone], option 1, otherwise speak to one of the staff during  normal business hours.         Ned Hodges    D: 10/28/2022 14:11:02       T: 10/28/2022 16:15:55     [email or web address]  Job#: [id number]     Doc#: [id number]    CC:  Anthony Ribeiro CRISS

## 2022-11-29 PROCEDURE — C1762 CONN TISS, HUMAN(INC FASCIA): HCPCS

## 2023-07-13 ENCOUNTER — HOSPITAL ENCOUNTER (OUTPATIENT)
Dept: MRI IMAGING | Age: 78
Discharge: HOME | End: 2023-07-13
Payer: MEDICARE

## 2023-07-13 VITALS — OXYGEN SATURATION: 96 % | HEART RATE: 81 BPM

## 2023-07-13 DIAGNOSIS — R92.8 OTHER ABNORMAL AND INCONCLUSIVE FINDINGS ON DIAGNOSTIC IMAGING OF BREAST: ICD-10-CM

## 2023-07-13 PROCEDURE — C8908 MRI W/O FOL W/CONT, BREAST,: HCPCS

## 2023-07-13 PROCEDURE — A9579 GAD-BASE MR CONTRAST NOS,1ML: HCPCS | Performed by: INTERNAL MEDICINE

## 2023-07-13 PROCEDURE — 6360000004 HC RX CONTRAST MEDICATION: Performed by: INTERNAL MEDICINE

## 2023-07-13 RX ADMIN — GADOTERIDOL 10 ML: 279.3 INJECTION, SOLUTION INTRAVENOUS at 17:39

## 2023-07-13 NOTE — PROGRESS NOTES
57 Pt arrived to MRI for MRI of the breast with pacemaker. 1620 Call placed to [facility] by MRI tech Alex Funes. [employer] changed pacer to MRI compatible mode. 1623 Patient assisted to table in supine position. Monitor attached to patient. Comfort ensured. 1626 Exam begins. 1633 Patient assisted to prone position. Comfort ensured. 1714 Exam complete. 1715 Monitor removed. Patient assisted to seated position. Comfort ensured. 1719 Call placed to [employer]. Pacemaker switched back to original setting. 1721 Patient transported to discharge lobby in stable condition.

## 2023-07-14 ENCOUNTER — HOSPITAL ENCOUNTER (OUTPATIENT)
Dept: WOMENS IMAGING | Age: 78
Discharge: HOME | End: 2023-07-14
Attending: RADIOLOGY

## 2023-07-14 DIAGNOSIS — Z00.6 ENCOUNTER FOR EXAMINATION FOR NORMAL COMPARISON OR CONTROL IN CLINICAL RESEARCH PROGRAM: ICD-10-CM

## 2023-08-01 ENCOUNTER — HOSPITAL ENCOUNTER (EMERGENCY)
Age: 78
Discharge: HOME | End: 2023-08-01
Attending: EMERGENCY MEDICINE
Payer: MEDICARE

## 2023-08-01 ENCOUNTER — APPOINTMENT (OUTPATIENT)
Dept: CT IMAGING | Age: 78
End: 2023-08-01
Payer: MEDICARE

## 2023-08-01 VITALS
WEIGHT: 130 LBS | HEART RATE: 80 BPM | SYSTOLIC BLOOD PRESSURE: 153 MMHG | RESPIRATION RATE: 16 BRPM | TEMPERATURE: 97.7 F | BODY MASS INDEX: 24.55 KG/M2 | DIASTOLIC BLOOD PRESSURE: 75 MMHG | HEIGHT: 61 IN | OXYGEN SATURATION: 96 %

## 2023-08-01 DIAGNOSIS — S06.0X0A CONCUSSION WITHOUT LOSS OF CONSCIOUSNESS, INITIAL ENCOUNTER: ICD-10-CM

## 2023-08-01 DIAGNOSIS — S09.90XA INJURY OF HEAD, INITIAL ENCOUNTER: Primary | ICD-10-CM

## 2023-08-01 DIAGNOSIS — S00.03XA HEMATOMA OF SCALP, INITIAL ENCOUNTER: ICD-10-CM

## 2023-08-01 PROCEDURE — 99284 EMERGENCY DEPT VISIT MOD MDM: CPT

## 2023-08-01 PROCEDURE — 70450 CT HEAD/BRAIN W/O DYE: CPT

## 2023-08-01 PROCEDURE — 6370000000 HC RX 637 (ALT 250 FOR IP): Performed by: EMERGENCY MEDICINE

## 2023-08-01 PROCEDURE — 72125 CT NECK SPINE W/O DYE: CPT

## 2023-08-01 RX ORDER — CLOPIDOGREL BISULFATE 75 MG/1
75 TABLET ORAL DAILY
COMMUNITY

## 2023-08-01 RX ORDER — FUROSEMIDE 20 MG/1
20 TABLET ORAL EVERY OTHER DAY
COMMUNITY
Start: 2023-03-02

## 2023-08-01 RX ORDER — ACETAMINOPHEN 500 MG
1000 TABLET ORAL ONCE
Status: COMPLETED | OUTPATIENT
Start: 2023-08-01 | End: 2023-08-01

## 2023-08-01 RX ADMIN — ACETAMINOPHEN 1000 MG: 500 TABLET ORAL at 22:02

## 2023-08-01 ASSESSMENT — PAIN - FUNCTIONAL ASSESSMENT
PAIN_FUNCTIONAL_ASSESSMENT: 0-10
PAIN_FUNCTIONAL_ASSESSMENT: ACTIVITIES ARE NOT PREVENTED
PAIN_FUNCTIONAL_ASSESSMENT: 0-10

## 2023-08-01 ASSESSMENT — PAIN DESCRIPTION - LOCATION: LOCATION: HEAD

## 2023-08-01 ASSESSMENT — PAIN DESCRIPTION - DESCRIPTORS: DESCRIPTORS: THROBBING

## 2023-08-01 ASSESSMENT — PAIN SCALES - GENERAL
PAINLEVEL_OUTOF10: 8
PAINLEVEL_OUTOF10: 9
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
## 2023-08-02 NOTE — DISCHARGE INSTRUCTIONS
Use ice to your head, 20 to 30 minutes on 2-3 times daily. This will help minimize swelling. Return to the ER for any problems or concerns. Follow-up with your primary caregiver soon as possible.

## 2024-08-01 ENCOUNTER — TELEPHONE (OUTPATIENT)
Dept: CARDIOLOGY CLINIC | Age: 79
End: 2024-08-01

## 2024-08-01 NOTE — TELEPHONE ENCOUNTER
Patient called she would like to change from [facility] to our office , she needs leads replaced  On her pace maker and [facility] telling her they  Are out 20 wks , she is concerned she wont   Make it that long please advise

## (undated) DEVICE — 1010 S-DRAPE TOWEL DRAPE 10/BX: Brand: STERI-DRAPE™

## (undated) DEVICE — AEGIS 1" DISK 4MM HOLE, PEEL OPEN: Brand: MEDLINE

## (undated) DEVICE — GLOVE ORANGE PI 8 1/2   MSG9085

## (undated) DEVICE — HEAD POSITIONER, SURGICAL: Brand: DEROYAL

## (undated) DEVICE — KIT EVAC 400CC PVC RADPQ Y CONN

## (undated) DEVICE — SYRINGE MED 10ML LUERLOCK TIP W/O SFTY DISP

## (undated) DEVICE — CHLORAPREP 26ML ORANGE

## (undated) DEVICE — DRAIN SURG 10FR PVC TB W/ TRCR MID PERF NO RESVR HUBLESS

## (undated) DEVICE — GOWN,SIRUS,NON REINFRCD,LARGE,SET IN SL: Brand: MEDLINE

## (undated) DEVICE — STRIP,CLOSURE,WOUND,MEDI-STRIP,1/2X4: Brand: MEDLINE

## (undated) DEVICE — AGENT HEMOSTATIC SURGIFLOW MATRIX KIT W/THROMBIN

## (undated) DEVICE — GLOVE ORANGE PI 8   MSG9080

## (undated) DEVICE — DRESSING,GAUZE,XEROFORM,CURAD,5"X9",ST: Brand: CURAD

## (undated) DEVICE — GOWN,SIRUS,NONRNF,SETINSLV,XL,20/CS: Brand: MEDLINE

## (undated) DEVICE — PENCIL SMK EVAC ALL IN 1 DSGN ENH VISIBILITY IMPROVED AIR

## (undated) DEVICE — BUR RND FLUT AGRSV 5MM

## (undated) DEVICE — AGENT HEMSTAT W2XL4IN OXIDIZED REGENERATED CELOS ABSRB SFT

## (undated) DEVICE — CODMAN® SURGICAL PATTIES 1" X 1" (2.54CM X 2.54CM): Brand: CODMAN®

## (undated) DEVICE — SUTURE ETHLN SZ 2-0 L30IN NONABSORBABLE BLK L36MM FSLX 3/8 1674H

## (undated) DEVICE — NEEDLE HYPO 25GA L1.5IN BLU POLYPR HUB S STL REG BVL STR

## (undated) DEVICE — SPONGE GZ W4XL8IN COT WVN 12 PLY

## (undated) DEVICE — Device

## (undated) DEVICE — BANDAGE,GAUZE,BULKEE II,4.5"X4.1YD,STRL: Brand: MEDLINE

## (undated) DEVICE — PAD OP RM W20XL72XH2IN PRECIS CUT FLAT EC BIOCLINIC

## (undated) DEVICE — ROYAL SILK SURGICAL GOWN, XXL: Brand: CONVERTORS

## (undated) DEVICE — NEEDLE HYPO 20GA L1.5IN YEL POLYPR HUB S STL REG BVL STR

## (undated) DEVICE — PACK-MAJOR

## (undated) DEVICE — BLADE ES L6IN ELASTOMERIC COAT EXT DURABLE BEND UPTO 90DEG

## (undated) DEVICE — SUTURE VCRL + SZ 2-0 L27IN ABSRB UD CP-1 1/2 CIR REV CUT VCP266H

## (undated) DEVICE — BASIC SINGLE BASIN BTC-LF: Brand: MEDLINE INDUSTRIES, INC.

## (undated) DEVICE — GLOVE SURG SZ 9 THK91MIL LTX FREE SYN POLYISOPRENE ANTI

## (undated) DEVICE — SUTURE NRLN SZ 4-0 L18IN NONABSORBABLE BLK L13MM TF 1/2 CIR C584D

## (undated) DEVICE — SUTURE ABSRB BRAID COAT UD CP NO 2 27IN VCRL J195H

## (undated) DEVICE — CODMAN® SURGICAL PATTIES 1/2" X 1/2" (1.27CM X 1.27CM): Brand: CODMAN®

## (undated) DEVICE — DRAPE MICSCP W132XL406CM LENS DIA68MM W VARI LENS2 FOR LEICA

## (undated) DEVICE — BANDAGE,GAUZE,CONFORMING,3"X75",STRL,LF: Brand: MEDLINE

## (undated) DEVICE — SUTURE ETHLN SZ 3-0 L18IN NONABSORBABLE BLK FS-1 L24MM 3/8 663H

## (undated) DEVICE — DRAPE,INSTRUMENT,MAGNETIC,10X16: Brand: MEDLINE

## (undated) DEVICE — JCKSON TBL POSTNER NO HD REST: Brand: MEDLINE INDUSTRIES, INC.